# Patient Record
Sex: FEMALE | Race: OTHER | HISPANIC OR LATINO
[De-identification: names, ages, dates, MRNs, and addresses within clinical notes are randomized per-mention and may not be internally consistent; named-entity substitution may affect disease eponyms.]

---

## 2018-01-07 ENCOUNTER — TRANSCRIPTION ENCOUNTER (OUTPATIENT)
Age: 83
End: 2018-01-07

## 2018-06-13 ENCOUNTER — TRANSCRIPTION ENCOUNTER (OUTPATIENT)
Age: 83
End: 2018-06-13

## 2018-10-03 ENCOUNTER — TRANSCRIPTION ENCOUNTER (OUTPATIENT)
Age: 83
End: 2018-10-03

## 2018-10-12 PROBLEM — Z00.00 ENCOUNTER FOR PREVENTIVE HEALTH EXAMINATION: Status: ACTIVE | Noted: 2018-10-12

## 2018-10-13 ENCOUNTER — INPATIENT (INPATIENT)
Facility: HOSPITAL | Age: 83
LOS: 0 days | Discharge: HOME | End: 2018-10-13
Attending: HOSPITALIST | Admitting: HOSPITALIST

## 2018-10-13 ENCOUNTER — TRANSCRIPTION ENCOUNTER (OUTPATIENT)
Age: 83
End: 2018-10-13

## 2018-10-13 VITALS
SYSTOLIC BLOOD PRESSURE: 122 MMHG | OXYGEN SATURATION: 96 % | TEMPERATURE: 97 F | RESPIRATION RATE: 18 BRPM | HEART RATE: 68 BPM | DIASTOLIC BLOOD PRESSURE: 62 MMHG

## 2018-10-13 VITALS
OXYGEN SATURATION: 97 % | RESPIRATION RATE: 16 BRPM | HEIGHT: 61 IN | TEMPERATURE: 96 F | SYSTOLIC BLOOD PRESSURE: 174 MMHG | HEART RATE: 57 BPM | DIASTOLIC BLOOD PRESSURE: 74 MMHG | WEIGHT: 110.01 LBS

## 2018-10-13 DIAGNOSIS — Z90.710 ACQUIRED ABSENCE OF BOTH CERVIX AND UTERUS: Chronic | ICD-10-CM

## 2018-10-13 LAB
ANION GAP SERPL CALC-SCNC: 11 MMOL/L — SIGNIFICANT CHANGE UP (ref 7–14)
APTT BLD: 37.5 SEC — SIGNIFICANT CHANGE UP (ref 27–39.2)
BASOPHILS # BLD AUTO: 0.06 K/UL — SIGNIFICANT CHANGE UP (ref 0–0.2)
BASOPHILS NFR BLD AUTO: 0.6 % — SIGNIFICANT CHANGE UP (ref 0–1)
BUN SERPL-MCNC: 29 MG/DL — HIGH (ref 10–20)
CALCIUM SERPL-MCNC: 10.4 MG/DL — HIGH (ref 8.5–10.1)
CHLORIDE SERPL-SCNC: 100 MMOL/L — SIGNIFICANT CHANGE UP (ref 98–110)
CK MB CFR SERPL CALC: 1.4 NG/ML — SIGNIFICANT CHANGE UP (ref 0.6–6.3)
CK MB CFR SERPL CALC: 1.5 NG/ML — SIGNIFICANT CHANGE UP (ref 0.6–6.3)
CK SERPL-CCNC: 45 U/L — SIGNIFICANT CHANGE UP (ref 0–225)
CK SERPL-CCNC: 46 U/L — SIGNIFICANT CHANGE UP (ref 0–225)
CO2 SERPL-SCNC: 30 MMOL/L — SIGNIFICANT CHANGE UP (ref 17–32)
CREAT SERPL-MCNC: 1.3 MG/DL — SIGNIFICANT CHANGE UP (ref 0.7–1.5)
EOSINOPHIL # BLD AUTO: 0.58 K/UL — SIGNIFICANT CHANGE UP (ref 0–0.7)
EOSINOPHIL NFR BLD AUTO: 5.4 % — SIGNIFICANT CHANGE UP (ref 0–8)
GLUCOSE BLDC GLUCOMTR-MCNC: 96 MG/DL — SIGNIFICANT CHANGE UP (ref 70–99)
GLUCOSE SERPL-MCNC: 111 MG/DL — HIGH (ref 70–99)
HCT VFR BLD CALC: 40.6 % — SIGNIFICANT CHANGE UP (ref 37–47)
HGB BLD-MCNC: 12.9 G/DL — SIGNIFICANT CHANGE UP (ref 12–16)
IMM GRANULOCYTES NFR BLD AUTO: 0.3 % — SIGNIFICANT CHANGE UP (ref 0.1–0.3)
INR BLD: 1.18 RATIO — SIGNIFICANT CHANGE UP (ref 0.65–1.3)
LYMPHOCYTES # BLD AUTO: 3.49 K/UL — HIGH (ref 1.2–3.4)
LYMPHOCYTES # BLD AUTO: 32.6 % — SIGNIFICANT CHANGE UP (ref 20.5–51.1)
MCHC RBC-ENTMCNC: 27.5 PG — SIGNIFICANT CHANGE UP (ref 27–31)
MCHC RBC-ENTMCNC: 31.8 G/DL — LOW (ref 32–37)
MCV RBC AUTO: 86.6 FL — SIGNIFICANT CHANGE UP (ref 81–99)
MONOCYTES # BLD AUTO: 1.33 K/UL — HIGH (ref 0.1–0.6)
MONOCYTES NFR BLD AUTO: 12.4 % — HIGH (ref 1.7–9.3)
NEUTROPHILS # BLD AUTO: 5.2 K/UL — SIGNIFICANT CHANGE UP (ref 1.4–6.5)
NEUTROPHILS NFR BLD AUTO: 48.7 % — SIGNIFICANT CHANGE UP (ref 42.2–75.2)
NRBC # BLD: 0 /100 WBCS — SIGNIFICANT CHANGE UP (ref 0–0)
NT-PROBNP SERPL-SCNC: 508 PG/ML — HIGH (ref 0–300)
PLATELET # BLD AUTO: 200 K/UL — SIGNIFICANT CHANGE UP (ref 130–400)
POTASSIUM SERPL-MCNC: 4.1 MMOL/L — SIGNIFICANT CHANGE UP (ref 3.5–5)
POTASSIUM SERPL-SCNC: 4.1 MMOL/L — SIGNIFICANT CHANGE UP (ref 3.5–5)
PROTHROM AB SERPL-ACNC: 13.5 SEC — HIGH (ref 9.95–12.87)
RBC # BLD: 4.69 M/UL — SIGNIFICANT CHANGE UP (ref 4.2–5.4)
RBC # FLD: 14.3 % — SIGNIFICANT CHANGE UP (ref 11.5–14.5)
SODIUM SERPL-SCNC: 141 MMOL/L — SIGNIFICANT CHANGE UP (ref 135–146)
TROPONIN T SERPL-MCNC: <0.01 NG/ML — SIGNIFICANT CHANGE UP
TROPONIN T SERPL-MCNC: <0.01 NG/ML — SIGNIFICANT CHANGE UP
WBC # BLD: 10.69 K/UL — SIGNIFICANT CHANGE UP (ref 4.8–10.8)
WBC # FLD AUTO: 10.69 K/UL — SIGNIFICANT CHANGE UP (ref 4.8–10.8)

## 2018-10-13 RX ORDER — MEMANTINE HYDROCHLORIDE 10 MG/1
5 TABLET ORAL DAILY
Qty: 0 | Refills: 0 | Status: DISCONTINUED | OUTPATIENT
Start: 2018-10-13 | End: 2018-10-13

## 2018-10-13 RX ORDER — CARVEDILOL PHOSPHATE 80 MG/1
25 CAPSULE, EXTENDED RELEASE ORAL
Qty: 0 | Refills: 0 | Status: DISCONTINUED | OUTPATIENT
Start: 2018-10-13 | End: 2018-10-13

## 2018-10-13 RX ORDER — APIXABAN 2.5 MG/1
2.5 TABLET, FILM COATED ORAL EVERY 12 HOURS
Qty: 0 | Refills: 0 | Status: DISCONTINUED | OUTPATIENT
Start: 2018-10-13 | End: 2018-10-13

## 2018-10-13 RX ORDER — PANTOPRAZOLE SODIUM 20 MG/1
40 TABLET, DELAYED RELEASE ORAL
Qty: 0 | Refills: 0 | Status: DISCONTINUED | OUTPATIENT
Start: 2018-10-13 | End: 2018-10-13

## 2018-10-13 RX ORDER — LATANOPROST 0.05 MG/ML
1 SOLUTION/ DROPS OPHTHALMIC; TOPICAL AT BEDTIME
Qty: 0 | Refills: 0 | Status: DISCONTINUED | OUTPATIENT
Start: 2018-10-13 | End: 2018-10-13

## 2018-10-13 RX ORDER — FUROSEMIDE 40 MG
20 TABLET ORAL ONCE
Qty: 0 | Refills: 0 | Status: COMPLETED | OUTPATIENT
Start: 2018-10-13 | End: 2018-10-13

## 2018-10-13 RX ORDER — DONEPEZIL HYDROCHLORIDE 10 MG/1
10 TABLET, FILM COATED ORAL AT BEDTIME
Qty: 0 | Refills: 0 | Status: DISCONTINUED | OUTPATIENT
Start: 2018-10-13 | End: 2018-10-13

## 2018-10-13 RX ORDER — LOSARTAN POTASSIUM 100 MG/1
50 TABLET, FILM COATED ORAL DAILY
Qty: 0 | Refills: 0 | Status: DISCONTINUED | OUTPATIENT
Start: 2018-10-13 | End: 2018-10-13

## 2018-10-13 RX ORDER — ATORVASTATIN CALCIUM 80 MG/1
40 TABLET, FILM COATED ORAL AT BEDTIME
Qty: 0 | Refills: 0 | Status: DISCONTINUED | OUTPATIENT
Start: 2018-10-13 | End: 2018-10-13

## 2018-10-13 RX ADMIN — LOSARTAN POTASSIUM 50 MILLIGRAM(S): 100 TABLET, FILM COATED ORAL at 12:53

## 2018-10-13 RX ADMIN — Medication 20 MILLIGRAM(S): at 12:53

## 2018-10-13 NOTE — H&P ADULT - FAMILY HISTORY
Mother  Still living? No  Family history of ischemic heart disease (IHD), Age at diagnosis: Age Unknown  Family history of stroke, Age at diagnosis: Age Unknown

## 2018-10-13 NOTE — H&P ADULT - ATTENDING COMMENTS
85 years old female pt with medical hx of CAD s/p PCI x3 2003, HTN, DLD, glucoma/cataract, bladder cancer s/p laser 2016 being followed by her urologist dr vigil, Fredrickib on eliquis, h/o of LBBB, non hodgkin's lymphoma in remission last chemotherapy april 2018,   1-Atypical chest pain  EKG no acute chabges- cardiac enzymes negative  Patient and family want to go home and they want to follow up with cardiology as outpatient  -Patient received chemo in 2018 for DLBCL , currently in complete remsision  Pain completely resolved      2- GERD will start pantopraxole  3- AFIB will resume eliquis  4- HTN uncontrolled  4- Dispo Planning: Home as per family  Patient medically stable    Pager No. [phone] -Patient evaluated independently, Chart reviewed, agree with the medical resident clinical findings and plan of care.  85 years old female pt with medical hx of CAD s/p PCI x3 2003, HTN, DLD, glucoma/cataract, bladder cancer s/p laser 2016 being followed by her urologist dr vigil, Afib on eliquis, h/o of LBBB, non hodgkin's lymphoma in remission last chemotherapy april 2018,   1-Atypical chest pain  EKG no acute chabges- cardiac enzymes negative  Patient and family want to go home and they want to follow up with cardiology as outpatient  -Patient received chemo in 2018 for DLBCL , currently in complete remsision  Pain completely resolved      2- GERD will start pantopraxole  3- AFIB will resume eliquis  4- HTN uncontrolled  4- Dispo Planning: Home as per family  Patient medically stable    Pager No. [phone]

## 2018-10-13 NOTE — DISCHARGE NOTE ADULT - PROVIDER TOKENS
FREE:[LAST:[finkelstein],PHONE:[(   )    -],FAX:[(   )    -],ADDRESS:[cardiologist in [place]  follow up within a week]]

## 2018-10-13 NOTE — ED ADULT NURSE NOTE - PMH
Afib    Bladder cancer  in remission  CAD (coronary artery disease)    Cataract    DM (diabetes mellitus)    Glaucoma    HLD (hyperlipidemia)    HTN (hypertension)    NHL (non-Hodgkin's lymphoma)  in remission

## 2018-10-13 NOTE — ED PROVIDER NOTE - OBJECTIVE STATEMENT
patient is c/o chest pain, Lt sided, described as tightness, denies any associated f/c/n/v/sob/abd pain/back pain, denies any rash, no trauma, denies any URI symptoms. Patient chest pain resolved spontaneously, and while in ED had another episode of chest pain, which resolved spontaneously. Denies any lower ext pain/swelling.

## 2018-10-13 NOTE — DISCHARGE NOTE ADULT - CARE PROVIDER_API CALL
finkelstein,   cardiologist in [place]  follow up within a week  Phone: (   )    -  Fax: (   )    -

## 2018-10-13 NOTE — H&P ADULT - NSHPPHYSICALEXAM_GEN_ALL_CORE
Vital Signs Last 24 Hrs  T(C): 36.3 (13 Oct 2018 08:24), Max: 36.4 (13 Oct 2018 04:53)  T(F): 97.3 (13 Oct 2018 08:24), Max: 97.6 (13 Oct 2018 04:53)  HR: 56 (13 Oct 2018 08:24) (56 - 57)  BP: 181/81 (13 Oct 2018 08:24) (157/67 - 181/81)  RR: 18 (13 Oct 2018 08:24) (16 - 19)  SpO2: 96% (13 Oct 2018 08:24) (95% - 97%)    PHYSICAL EXAM:  GENERAL: NAD, speaks in full sentences, no signs of respiratory distress  CHEST/LUNG: Clear to auscultation bilaterally; No wheeze; No crackles; No accessory muscles used  HEART: Regular rate and rhythm; No murmurs;   ABDOMEN: Soft, Nontender, Nondistended; Bowel sounds present; No guarding  EXTREMITIES:  2+ Peripheral Pulses, No cyanosis or edema

## 2018-10-13 NOTE — H&P ADULT - ASSESSMENT
85 years old female pt with medical hx of CAD s/p PCI x3 2003, HTN, DLD, glucoma/cataract, bladder cancer s/p laser 2016 being followed by her urologist Filiberto neely on eliquis, h/o of LBBB, non hodgkin's lymphoma in remission last chemotherapy april 2018, vascular dementia, stage 3 CKD came to ER because of chest pain.    # chest pain for evaluation     ? GERD, NEED TO RULE OUT acs     first set of cardiac enzyme neg,      LBBB on ekg, no st elevation     will monitor cardiac enzymes 2 more sets     family requesting discharge         # GERD will start pantopraxole  # AFIB will resume eliquis  # HTN uncontrolled    will resume her BP medications    # DLD will resume statin  # diet DASH diet  # activity as tolerated  # full code 85 years old female pt with medical hx of CAD s/p PCI x3 2003, HTN, DLD, glucoma/cataract, bladder cancer s/p laser 2016 being followed by her urologist Filiberto neely on eliquis, h/o of LBBB, non hodgkin's lymphoma in remission last chemotherapy april 2018, vascular dementia, stage 3 CKD came to ER because of chest pain.    # chest pain for evaluation     ? GERD, NEED TO RULE OUT acs     first set of cardiac enzyme neg,      LBBB on ekg, no st elevation     will monitor cardiac enzymes 2 more sets     family requesting discharge    bnp > 500, congestion on xray will give stat 20 mg IV lasix         # GERD will start pantopraxole  # AFIB will resume eliquis  # HTN uncontrolled    will resume her BP medications    # DLD will resume statin  # diet DASH diet  # activity as tolerated  # full code

## 2018-10-13 NOTE — DISCHARGE NOTE ADULT - PLAN OF CARE
optimize pain likely non cardiac in rigin  cardiac enzymes and ekg negative  follow up with dr finkelstein as outpt, I called her cardiologist , aware of condition, agree with discharge on eliquis 2.5 mg BID controlled on statin controlled as outpt  on carvedilol and candesartan on eye drops currently in remission s/p last chemotherapy 04./2018

## 2018-10-13 NOTE — ED PROVIDER NOTE - MEDICAL DECISION MAKING DETAILS
patient remained hemodynamically stable, labs/eKG noted and discussed with patient. patient remained hemodynamically stable, labs/eKG noted and discussed with patient and her daughter, patient is admitted to Tele due to high CHELITA, as per EDOU provider, patient is not an OBS candidate.

## 2018-10-13 NOTE — DISCHARGE NOTE ADULT - HOSPITAL COURSE
85 years old female pt with medical hx of CAD s/p PCI x3 2003, HTN, DLD, glucoma/cataract, bladder cancer s/p laser 2016 being followed by her urologist Filiberto neely on eliquis, h/o of LBBB, non hodgkin's lymphoma in remission last chemotherapy april 2018, vascular dementia, stage 3 CKD came to ER because of chest pain.  as per daughter chest pain was left sided non radiating pressure like 4/10, continued for 10 minutes  she didnt have any chest pain INer, vitals were stable apart fronm BP was in high range but she didnt take any BP meds since yesterday night  cardiac enzymes neg, no acute st changes in EKG  discharge with follow up with her cardiologist in [place], i talked to cardiologist personally she agrees with discharge plan

## 2018-10-13 NOTE — ED PROVIDER NOTE - EYES NEGATIVE STATEMENT, MLM
cup/fed by clinician fed by PCA from group home at bedside/spoon no discharge, no irritation, no pain, no redness, and no visual changes.

## 2018-10-13 NOTE — DISCHARGE NOTE ADULT - CARE PLAN
Principal Discharge DX:	Chest pain  Goal:	optimize pain  Assessment and plan of treatment:	likely non cardiac in rigin  cardiac enzymes and ekg negative  follow up with dr finkelstein as outpt, I called her cardiologist , aware of condition, agree with discharge  Secondary Diagnosis:	Afib  Assessment and plan of treatment:	on eliquis 2.5 mg BID  Secondary Diagnosis:	HLD (hyperlipidemia)  Assessment and plan of treatment:	controlled on statin  Secondary Diagnosis:	HTN (hypertension)  Assessment and plan of treatment:	controlled as outpt  on carvedilol and candesartan  Secondary Diagnosis:	Cataract  Assessment and plan of treatment:	on eye drops  Secondary Diagnosis:	NHL (non-Hodgkin's lymphoma)  Assessment and plan of treatment:	currently in remission s/p last chemotherapy 04./2018

## 2018-10-13 NOTE — DISCHARGE NOTE ADULT - OTHER SIGNIFICANT FINDINGS
PHYSICAL EXAM:  GENERAL: NAD, speaks in full sentences, no signs of respiratory distress  CHEST/LUNG: Clear to auscultation bilaterally; No wheeze; No crackles; No accessory muscles used  HEART: Regular rate and rhythm; No murmurs;   ABDOMEN: Soft, Nontender, Nondistended; Bowel sounds present; No guarding  EXTREMITIES:  2+ Peripheral Pulses, No cyanosis or edema    10-13    141  |  100  |  29<H>  ----------------------------<  111<H>  4.1   |  30  |  1.3    Ca    10.4<H>      13 Oct 2018 03:30                          12.9   10.69 )-----------( 200      ( 13 Oct 2018 03:30 )             40.6   Troponin T, Serum (10.13.18 @ 03:30)    Troponin T, Serum: <0.01 ng/mL

## 2018-10-13 NOTE — ED ADULT NURSE NOTE - NSIMPLEMENTINTERV_GEN_ALL_ED
Implemented All Fall with Harm Risk Interventions:  [place] to call system. Call bell, personal items and telephone within reach. Instruct patient to call for assistance. Room bathroom lighting operational. Non-slip footwear when patient is off stretcher. Physically safe environment: no spills, clutter or unnecessary equipment. Stretcher in lowest position, wheels locked, appropriate side rails in place. Provide visual cue, wrist band, yellow gown, etc. Monitor gait and stability. Monitor for mental status changes and reorient to person, place, and time. Review medications for side effects contributing to fall risk. Reinforce activity limits and safety measures with patient and family. Provide visual clues: red socks.

## 2018-10-13 NOTE — H&P ADULT - NSHPLABSRESULTS_GEN_ALL_CORE
10-13    141  |  100  |  29<H>  ----------------------------<  111<H>  4.1   |  30  |  1.3    Ca    10.4<H>      13 Oct 2018 03:30                            12.9   10.69 )-----------( 200      ( 13 Oct 2018 03:30 )             40.6   Troponin T, Serum (10.13.18 @ 03:30)    Troponin T, Serum: <0.01 ng/mL

## 2018-10-13 NOTE — DISCHARGE NOTE ADULT - PATIENT PORTAL LINK FT
You can access the [facility] Patient Portal, offered by [facility], by registering with the following website: [email or web address]

## 2018-10-13 NOTE — DISCHARGE NOTE ADULT - MEDICATION SUMMARY - MEDICATIONS TO TAKE
I will START or STAY ON the medications listed below when I get home from the hospital:    candesartan 16 mg oral tablet  -- 1 tab(s) by mouth once a day  -- Indication: For HTN (hypertension)    Eliquis 2.5 mg oral tablet  -- 1 tab(s) by mouth 2 times a day  -- Indication: For Afib    carvedilol 25 mg oral tablet  -- 1 tab(s) by mouth 2 times a day  -- Indication: For CAD (coronary artery disease)    donepezil 10 mg oral tablet  -- 1 tab(s) by mouth once a day (at bedtime)  -- Indication: For Dementia    memantine 5 mg oral tablet  -- orally once a day  -- Indication: For Dementia    latanoprost ophthalmic  -- Indication: For Cataract    Combigan 0.2%-0.5% ophthalmic solution  -- 1 drop(s) to each affected eye every 12 hours  -- Indication: For Cataract

## 2018-10-13 NOTE — H&P ADULT - HISTORY OF PRESENT ILLNESS
85 years old female pt with medical hx of CAD s/p PCI x3 2003, HTN, DLD, glucoma/cataract, bladder cancer s/p laser 2016 being followed by her urologist Filiberto neely on eliquis, h/o of LBBB, non hodgkin's lymphoma in remission last chemotherapy april 2018, vascular dementia, stage 3 CKD came to ER because of chest pain.  the patient has dementia she doesnt recall what happened last night, she stated that she had lower abdominal pain that resolved after passing gas.  as per daughter, yesterday night her mother called her she had chest pain, which was left sided 4/10 in intensity non radiating pressure like not associated with SOB, palpitation that lasted for 10 minutes.  she denies any nausea, abdominal pain, no urinary complaints, has chronic cough non productive, which her primary thinks GERD, has outpt appointment with pulmonologist .  she is mobile, has 24 hour aid for her daily activities, denies any sick contact or travelling history.  she had her flu and pneumococcal vaccine this year.  she was treated with 7 days of oral antibiotics for UTI last week,  doesnt recall the antibiotic name.  In er, her vitals are stable, first set of cardiac enzymes negative, no chest pain .

## 2018-10-19 DIAGNOSIS — I25.10 ATHEROSCLEROTIC HEART DISEASE OF NATIVE CORONARY ARTERY WITHOUT ANGINA PECTORIS: ICD-10-CM

## 2018-10-19 DIAGNOSIS — Z95.5 PRESENCE OF CORONARY ANGIOPLASTY IMPLANT AND GRAFT: ICD-10-CM

## 2018-10-19 DIAGNOSIS — Z28.21 IMMUNIZATION NOT CARRIED OUT BECAUSE OF PATIENT REFUSAL: ICD-10-CM

## 2018-10-19 DIAGNOSIS — Z85.51 PERSONAL HISTORY OF MALIGNANT NEOPLASM OF BLADDER: ICD-10-CM

## 2018-10-19 DIAGNOSIS — Z79.01 LONG TERM (CURRENT) USE OF ANTICOAGULANTS: ICD-10-CM

## 2018-10-19 DIAGNOSIS — R07.89 OTHER CHEST PAIN: ICD-10-CM

## 2018-10-19 DIAGNOSIS — I48.91 UNSPECIFIED ATRIAL FIBRILLATION: ICD-10-CM

## 2018-10-19 DIAGNOSIS — K21.9 GASTRO-ESOPHAGEAL REFLUX DISEASE WITHOUT ESOPHAGITIS: ICD-10-CM

## 2018-10-19 DIAGNOSIS — H40.9 UNSPECIFIED GLAUCOMA: ICD-10-CM

## 2018-10-19 DIAGNOSIS — C85.90 NON-HODGKIN LYMPHOMA, UNSPECIFIED, UNSPECIFIED SITE: ICD-10-CM

## 2018-10-19 DIAGNOSIS — E78.5 HYPERLIPIDEMIA, UNSPECIFIED: ICD-10-CM

## 2018-10-19 DIAGNOSIS — I12.9 HYPERTENSIVE CHRONIC KIDNEY DISEASE WITH STAGE 1 THROUGH STAGE 4 CHRONIC KIDNEY DISEASE, OR UNSPECIFIED CHRONIC KIDNEY DISEASE: ICD-10-CM

## 2018-10-19 DIAGNOSIS — N18.3 CHRONIC KIDNEY DISEASE, STAGE 3 (MODERATE): ICD-10-CM

## 2018-10-19 DIAGNOSIS — F01.50 VASCULAR DEMENTIA WITHOUT BEHAVIORAL DISTURBANCE: ICD-10-CM

## 2018-10-19 DIAGNOSIS — R07.9 CHEST PAIN, UNSPECIFIED: ICD-10-CM

## 2018-10-19 DIAGNOSIS — H26.9 UNSPECIFIED CATARACT: ICD-10-CM

## 2018-11-05 ENCOUNTER — APPOINTMENT (OUTPATIENT)
Dept: UROLOGY | Facility: CLINIC | Age: 83
End: 2018-11-05
Payer: MEDICARE

## 2018-11-05 DIAGNOSIS — C66.9 MALIGNANT NEOPLASM OF UNSPECIFIED URETER: ICD-10-CM

## 2018-11-05 DIAGNOSIS — N13.30 UNSPECIFIED HYDRONEPHROSIS: ICD-10-CM

## 2018-11-05 DIAGNOSIS — C67.9 MALIGNANT NEOPLASM OF BLADDER, UNSPECIFIED: ICD-10-CM

## 2018-11-05 PROCEDURE — 99203 OFFICE O/P NEW LOW 30 MIN: CPT

## 2018-11-05 NOTE — REVIEW OF SYSTEMS
[see HPI] : see HPI [Negative] : Psychiatric [Fever] : no fever [Chills] : no chills [Feeling Poorly] : not feeling poorly [Feeling Tired] : not feeling tired [Eye Pain] : no eye pain [Red Eyes] : eyes not red [Earache] : no earache [Loss Of Hearing] : no hearing loss [Shortness Of Breath] : no shortness of breath [Abdominal Pain] : no abdominal pain [Vomiting] : no vomiting [Arthralgias] : no arthralgias [Joint Pain] : no joint pain [Skin Lesions] : no skin lesions

## 2018-11-05 NOTE — ASSESSMENT
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

## 2018-11-05 NOTE — PHYSICAL EXAM

## 2018-12-19 ENCOUNTER — TRANSCRIPTION ENCOUNTER (OUTPATIENT)
Age: 83
End: 2018-12-19

## 2019-01-14 PROBLEM — I25.10 ATHEROSCLEROTIC HEART DISEASE OF NATIVE CORONARY ARTERY WITHOUT ANGINA PECTORIS: Chronic | Status: ACTIVE | Noted: 2018-10-13

## 2019-01-14 PROBLEM — H40.9 UNSPECIFIED GLAUCOMA: Chronic | Status: ACTIVE | Noted: 2018-10-13

## 2019-01-14 PROBLEM — E11.9 TYPE 2 DIABETES MELLITUS WITHOUT COMPLICATIONS: Chronic | Status: ACTIVE | Noted: 2018-10-13

## 2019-01-14 PROBLEM — C85.90 NON-HODGKIN LYMPHOMA, UNSPECIFIED, UNSPECIFIED SITE: Chronic | Status: ACTIVE | Noted: 2018-10-13

## 2019-01-14 PROBLEM — C67.9 MALIGNANT NEOPLASM OF BLADDER, UNSPECIFIED: Chronic | Status: ACTIVE | Noted: 2018-10-13

## 2019-01-14 PROBLEM — I10 ESSENTIAL (PRIMARY) HYPERTENSION: Chronic | Status: ACTIVE | Noted: 2018-10-13

## 2019-01-14 PROBLEM — E78.5 HYPERLIPIDEMIA, UNSPECIFIED: Chronic | Status: ACTIVE | Noted: 2018-10-13

## 2019-01-14 PROBLEM — I48.91 UNSPECIFIED ATRIAL FIBRILLATION: Chronic | Status: ACTIVE | Noted: 2018-10-13

## 2019-01-14 PROBLEM — H26.9 UNSPECIFIED CATARACT: Chronic | Status: ACTIVE | Noted: 2018-10-13

## 2019-03-04 ENCOUNTER — APPOINTMENT (OUTPATIENT)
Dept: UROLOGY | Facility: CLINIC | Age: 84
End: 2019-03-04

## 2019-04-17 ENCOUNTER — MEDICATION RENEWAL (OUTPATIENT)
Age: 84
End: 2019-04-17

## 2019-04-17 DIAGNOSIS — E78.5 HYPERLIPIDEMIA, UNSPECIFIED: ICD-10-CM

## 2019-04-17 RX ORDER — ROSUVASTATIN CALCIUM 20 MG/1
20 TABLET, FILM COATED ORAL DAILY
Qty: 90 | Refills: 3 | Status: ACTIVE | COMMUNITY
Start: 2019-04-17 | End: 1900-01-01

## 2020-11-05 NOTE — HISTORY OF PRESENT ILLNESS
Referred by: Yancy Hill MD; Medical Diagnosis (from order):    Diagnosis Information      Diagnosis    724.2 (ICD-9-CM) - M54.5 (ICD-10-CM) - Bilateral low back pain without sciatica, unspecified chronicity                Physical Therapy -  Daily Treatment Note    Visit:  2     SUBJECTIVE                                                                                                             [place] the zoo and have a little bit of lateral ankle pain per pt       OBJECTIVE                                                                                                                        TREATMENT                                                                                                                  Therapeutic Exercise:  Pt education of stability VS flexibility and post partum laxity   Started on HEP and performed  ADD with red ball  Add with pelvic tilt   Add with bridge   ABD with red band   Only pain free ROM     Bridge varied foot placement 20  Butterfly bridge 20  Clam r/L   lapricone clam 20    Legs  up on swissball   [facility]     Skilled input: verbal instruction/cues    Writer verbally educated and received verbal consent for hand placement, positioning of patient, and techniques to be performed today from patient     Home Exercise Program: Started on HEP and performed  ADD with red ball  Add with pelvic tilt   Add with bridge   ABD with red band   Only pain free ROM     Added ABD exercise toe tap on supine knee at 90 degrees     Cryotherapy (02171): Cold Pack  Location: LB  Position: supine  Duration: 15 minutes  Results: improved range of motion and decreased pain      ASSESSMENT                                                                                                             Tolerated the treatment well and will cont to progress as tolerated with core and ABD activation exercises     Patient Education:   Results of above outlined education: Demonstrates understanding, Needs reinforcement and Verbalizes understanding      PLAN                                                                                                                           Suggestions for next session as indicated: Progress per plan of care  Cont to progress as tolerated        GOALS                                                                                                                       Long Term Goals: To be met by end of plan of care:      Home Exercise Program: Independent with progressed and modified home exercise program (HEP)      Sit: Sit for 45 minutes for a meal and feeding the children (Eating (self-care))  Lift: Lift household items and with reported manageable/tolerable difficulty (Assisting others (examples: , elder care) (domestic life))  Bend/Squat: Bend/squat for activities of daily living and instrumental activites of daily living with reported manageable/tolerable difficulty (Acquisition of necessities (examples: grocery shopping, gardening) (domestic life))    Patient Reported Outcome Measure: Improvement in function /disability/impairment as indicated by Oswestry (minimal detectable change - 12%) , or =   25       Procedures and total treatment time documented Time Entry flowsheet. [FreeTextEntry1] : 84 yo with urothelial cancer (ureter vs bladder)\par she also has a history of vascular dementia\par and non-hodgkins lymphoma\par \par she was diagnosed in 2016\par she required ureteral stent placement\par she had evidence of a right ureteral tumor\par \par the information was extrapolated from imaging studies and pathology reports that were in her binder\par the doctors notes and treatment records were not available\par \par it is not clear to me how her cancer was treated and why she required stents - lymphoma vs bladder cancer\par she also doesn’t have any recent imaging studies (last study > 6 months ago) [Urinary Incontinence] : no urinary incontinence [Urinary Retention] : no urinary retention [Nocturia] : no nocturia [Straining] : no straining [Weak Stream] : no weak stream [Hematuria - Gross] : no gross hematuria

## 2020-11-12 ENCOUNTER — APPOINTMENT (OUTPATIENT)
Dept: GERIATRICS | Facility: CLINIC | Age: 85
End: 2020-11-12

## 2022-05-14 ENCOUNTER — NON-APPOINTMENT (OUTPATIENT)
Age: 87
End: 2022-05-14

## 2022-09-02 NOTE — DISCHARGE NOTE ADULT - NS AS DC AMI YN
Accessed site: right radial artery. Radial access needle used. Number of attempts: 1. Accessed successfully. no

## 2022-12-26 ENCOUNTER — EMERGENCY (EMERGENCY)
Facility: HOSPITAL | Age: 87
LOS: 0 days | Discharge: HOME | End: 2022-12-26
Attending: EMERGENCY MEDICINE | Admitting: EMERGENCY MEDICINE
Payer: MEDICARE

## 2022-12-26 VITALS
OXYGEN SATURATION: 94 % | DIASTOLIC BLOOD PRESSURE: 70 MMHG | SYSTOLIC BLOOD PRESSURE: 156 MMHG | HEART RATE: 97 BPM | TEMPERATURE: 96 F | RESPIRATION RATE: 20 BRPM | WEIGHT: 138.01 LBS

## 2022-12-26 DIAGNOSIS — I50.9 HEART FAILURE, UNSPECIFIED: ICD-10-CM

## 2022-12-26 DIAGNOSIS — M79.89 OTHER SPECIFIED SOFT TISSUE DISORDERS: ICD-10-CM

## 2022-12-26 DIAGNOSIS — I48.91 UNSPECIFIED ATRIAL FIBRILLATION: ICD-10-CM

## 2022-12-26 DIAGNOSIS — I25.10 ATHEROSCLEROTIC HEART DISEASE OF NATIVE CORONARY ARTERY WITHOUT ANGINA PECTORIS: ICD-10-CM

## 2022-12-26 DIAGNOSIS — R22.0 LOCALIZED SWELLING, MASS AND LUMP, HEAD: ICD-10-CM

## 2022-12-26 DIAGNOSIS — Z95.5 PRESENCE OF CORONARY ANGIOPLASTY IMPLANT AND GRAFT: ICD-10-CM

## 2022-12-26 DIAGNOSIS — R60.9 EDEMA, UNSPECIFIED: ICD-10-CM

## 2022-12-26 DIAGNOSIS — N18.9 CHRONIC KIDNEY DISEASE, UNSPECIFIED: ICD-10-CM

## 2022-12-26 DIAGNOSIS — Z90.710 ACQUIRED ABSENCE OF BOTH CERVIX AND UTERUS: Chronic | ICD-10-CM

## 2022-12-26 DIAGNOSIS — Z79.01 LONG TERM (CURRENT) USE OF ANTICOAGULANTS: ICD-10-CM

## 2022-12-26 LAB
ALBUMIN SERPL ELPH-MCNC: 3.2 G/DL — LOW (ref 3.5–5.2)
ALP SERPL-CCNC: 138 U/L — HIGH (ref 30–115)
ALT FLD-CCNC: 22 U/L — SIGNIFICANT CHANGE UP (ref 0–41)
ANION GAP SERPL CALC-SCNC: 8 MMOL/L — SIGNIFICANT CHANGE UP (ref 7–14)
AST SERPL-CCNC: 30 U/L — SIGNIFICANT CHANGE UP (ref 0–41)
BASOPHILS # BLD AUTO: 0.05 K/UL — SIGNIFICANT CHANGE UP (ref 0–0.2)
BASOPHILS NFR BLD AUTO: 1.2 % — HIGH (ref 0–1)
BILIRUB SERPL-MCNC: 0.4 MG/DL — SIGNIFICANT CHANGE UP (ref 0.2–1.2)
BUN SERPL-MCNC: 32 MG/DL — HIGH (ref 10–20)
CALCIUM SERPL-MCNC: 9.2 MG/DL — SIGNIFICANT CHANGE UP (ref 8.4–10.5)
CHLORIDE SERPL-SCNC: 102 MMOL/L — SIGNIFICANT CHANGE UP (ref 98–110)
CO2 SERPL-SCNC: 21 MMOL/L — SIGNIFICANT CHANGE UP (ref 17–32)
CREAT SERPL-MCNC: 1.6 MG/DL — HIGH (ref 0.7–1.5)
EGFR: 30 ML/MIN/1.73M2 — LOW
EOSINOPHIL # BLD AUTO: 0.2 K/UL — SIGNIFICANT CHANGE UP (ref 0–0.7)
EOSINOPHIL NFR BLD AUTO: 4.8 % — SIGNIFICANT CHANGE UP (ref 0–8)
GLUCOSE SERPL-MCNC: 140 MG/DL — HIGH (ref 70–99)
HCT VFR BLD CALC: 36.9 % — LOW (ref 37–47)
HGB BLD-MCNC: 11.7 G/DL — LOW (ref 12–16)
IMM GRANULOCYTES NFR BLD AUTO: 0.2 % — SIGNIFICANT CHANGE UP (ref 0.1–0.3)
LYMPHOCYTES # BLD AUTO: 1.31 K/UL — SIGNIFICANT CHANGE UP (ref 1.2–3.4)
LYMPHOCYTES # BLD AUTO: 31.3 % — SIGNIFICANT CHANGE UP (ref 20.5–51.1)
MAGNESIUM SERPL-MCNC: 2.1 MG/DL — SIGNIFICANT CHANGE UP (ref 1.8–2.4)
MCHC RBC-ENTMCNC: 25.7 PG — LOW (ref 27–31)
MCHC RBC-ENTMCNC: 31.7 G/DL — LOW (ref 32–37)
MCV RBC AUTO: 81.1 FL — SIGNIFICANT CHANGE UP (ref 81–99)
MONOCYTES # BLD AUTO: 0.68 K/UL — HIGH (ref 0.1–0.6)
MONOCYTES NFR BLD AUTO: 16.3 % — HIGH (ref 1.7–9.3)
NEUTROPHILS # BLD AUTO: 1.93 K/UL — SIGNIFICANT CHANGE UP (ref 1.4–6.5)
NEUTROPHILS NFR BLD AUTO: 46.2 % — SIGNIFICANT CHANGE UP (ref 42.2–75.2)
NRBC # BLD: 0 /100 WBCS — SIGNIFICANT CHANGE UP (ref 0–0)
NT-PROBNP SERPL-SCNC: 6715 PG/ML — HIGH (ref 0–300)
PLATELET # BLD AUTO: 217 K/UL — SIGNIFICANT CHANGE UP (ref 130–400)
POTASSIUM SERPL-MCNC: 4.8 MMOL/L — SIGNIFICANT CHANGE UP (ref 3.5–5)
POTASSIUM SERPL-SCNC: 4.8 MMOL/L — SIGNIFICANT CHANGE UP (ref 3.5–5)
PROT SERPL-MCNC: 6 G/DL — SIGNIFICANT CHANGE UP (ref 6–8)
RBC # BLD: 4.55 M/UL — SIGNIFICANT CHANGE UP (ref 4.2–5.4)
RBC # FLD: 15.5 % — HIGH (ref 11.5–14.5)
SODIUM SERPL-SCNC: 131 MMOL/L — LOW (ref 135–146)
TROPONIN T SERPL-MCNC: <0.01 NG/ML — SIGNIFICANT CHANGE UP
WBC # BLD: 4.18 K/UL — LOW (ref 4.8–10.8)
WBC # FLD AUTO: 4.18 K/UL — LOW (ref 4.8–10.8)

## 2022-12-26 PROCEDURE — 71045 X-RAY EXAM CHEST 1 VIEW: CPT | Mod: 26

## 2022-12-26 PROCEDURE — 99285 EMERGENCY DEPT VISIT HI MDM: CPT

## 2022-12-26 PROCEDURE — 93010 ELECTROCARDIOGRAM REPORT: CPT

## 2022-12-26 RX ORDER — CANDESARTAN CILEXETIL 8 MG/1
1 TABLET ORAL
Qty: 0 | Refills: 0 | DISCHARGE

## 2022-12-26 RX ORDER — CARVEDILOL PHOSPHATE 80 MG/1
1 CAPSULE, EXTENDED RELEASE ORAL
Qty: 0 | Refills: 0 | DISCHARGE

## 2022-12-26 NOTE — ED PROVIDER NOTE - PROGRESS NOTE DETAILS
EK - spoke to cardiology on call covering for pt's cardiologist Dr. Yang - pt w/ prior hx sensitivity to volume changes (taken off HCTZ in the past for orthostatic hypotension/syncope, taken off lasix) and recent EF only mildly dec.  If leg swelling is not severe, ok to defer question of diuretic rx to o/p cardiology - will call for appointment.

## 2022-12-26 NOTE — ED ADULT NURSE NOTE - SUICIDE SCREENING DEPRESSION
Negative
EXAM:  CT CERVICAL SPINE                          EXAM:  CT BRAIN                            PROCEDURE DATE:  08/27/2017            INTERPRETATION:  CLINICAL INFORMATION: Status post fall. Trauma. Altered   mental status. Neck pain.    TECHNIQUE:  1.  Noncontrast axial CT images were acquired the head.  Two-dimensional   reformats were generated.  2.  Noncontrast axial CT images were acquired to the cervical spine.    Two-dimensional reformats were generated.    COMPARISON: CT head and cervicalspine 2/21/2017.    FINDINGS:    HEAD:    There is no CT evidence of acute intracranial hemorrhage, extra-axial   collection, vasogenic edema, mass effect, midline shift, central   herniation, hydrocephalus or acute territorial infarct.     Moderate generalized cerebral volume loss and mild periventricular white   matter hypoattenuation compatible chronic microvascular ischemic disease   are stable since 02/21/2017.    The visualized paranasal sinuses are clear.    The mastoid air cells and middle ear cavities are grossly clear.    The visualized bones and extra cranial soft tissues are grossly   unremarkable.

## 2022-12-26 NOTE — ED PROVIDER NOTE - NSFOLLOWUPINSTRUCTIONS_ED_ALL_ED_FT
[address]853 Comprehensive WHAT YOU NEED TO KNOW:    Leg edema is swelling caused by fluid buildup. Your legs may swell if you sit or stand for long periods of time, are pregnant, or are injured. Swelling may also occur if you have heart failure or circulation problems. This means that your heart does not pump blood through your body as it should.    DISCHARGE INSTRUCTIONS:    Self-care:     Elevate your legs: Raise your legs above the level of your heart as often as you can. This will help decrease swelling and pain. Prop your legs on pillows or blankets to keep them elevated comfortably.      Wear pressure stockings: These tight stockings put pressure on your legs to promote blood flow and prevent blood clots. Wear the stockings during the day. Do not wear them while you sleep.      Apply heat: Heat helps decrease pain and swelling. Apply heat on the area for 20 to 30 minutes every 2 hours for as many days as directed.       Stay active: Do not stand or sit for long periods of time. Ask your healthcare provider about the best exercise plan for you.      Eat healthy foods: Healthy foods include fruits, vegetables, whole-grain breads, low-fat dairy products, beans, lean meats, and fish. Ask if you need to be on a special diet. Limit salt. Salt will make your body hold even more fluid.    Follow up with your healthcare provider as directed: Write down your questions so you remember to ask them during your visits.     Contact your healthcare provider if:     You have a fever or feel more tired than usual.      The veins in your legs look larger than usual. They may look full or bulging.      Your legs itch or feel heavy.      You have red or white areas or sores on your legs. The skin may also appear dimpled or have indentations.      You are gaining weight.      You have trouble moving your ankles.      The swelling does not go away, or other parts of your body swell.      You have questions or concerns about your condition or care.    Return to the emergency department if:     You cannot walk.      You feel faint or confused.       Your skin turns blue or gray.      Your leg feels warm, tender, and painful. It may be swollen and red.      You have chest pain or trouble breathing that is worse when you lie down.      You suddenly feel lightheaded and have trouble breathing.      You have new and sudden chest pain. You may have more pain when you take deep breaths or cough. You may also cough up blood.

## 2022-12-26 NOTE — ED PROVIDER NOTE - OBJECTIVE STATEMENT
89yF CAD CKD p/w b/l LE x 3wk, apparently worsened today.  Hx provided by pt but mostly by daughter at bedside - b/l LE edema was first noted on visit with Dr. Salazar 12/5.  Today, HHA contacted daughter to say leg swelling looked worse and she also had facial swelling.  Daughter assessed her this afternoon - facial swelling has completely resolved but leg swelling still present.  Daughter called pt's cardiologist Dr. Worley of [facility] and was advised to bring pt to the ED for evaluation.  Daughter says cardiologist is awaiting ED's call.  Daughter says pt was previously on lasix but it was stopped ~2yr ago bc pt's swelling had resolved.  No new CP or SOB.

## 2022-12-26 NOTE — ED PROVIDER NOTE - NS ED ROS FT
Constitutional: no fevers/chills, no sick contacts  Eyes: No visual changes, eye pain or discharge. No photophobia  ENMT: No hearing changes, pain, discharge or infections. No sore throat or drooling.  Neck:  No neck pain or stiffness. No limited ROM  Cardiac: No SOB, +edema. No chest pain with exertion.  Respiratory: No cough or respiratory distress. No hemoptysis. No history of asthma or RAD  GI: No nausea, vomiting, diarrhea or abdominal pain  : No dysuria, frequency or burning. No discharge  MS: No myalgia, muscle weakness, joint pain or back pain  Neuro: No headache or weakness. No LOC  Skin: No skin rash  Endo: no diabetes or thyroid dysfunction  Heme: no abnormal bleeding or clotting  Except as documented in the HPI, all other systems are negative

## 2022-12-26 NOTE — ED PROVIDER NOTE - PHYSICAL EXAMINATION
CONSTITUTIONAL: well developed; well nourished; well appearing in no acute distress  HEAD: normocephalic; atraumatic  EYES: no conjunctival injection, no scleral icterus  ENT: no nasal discharge; airway clear.  NECK: supple; non tender. + full passive ROM in all directions  CARD: S1, S2 normal; no murmurs, gallops, or rubs. Regular rate and rhythm  RESP: b/l breath sounds w/o tachypnea or inc WOB, +scattered rhonchi  ABD: soft; non-distended; non-tender. No rebound, no guarding, no pulsatile abdominal mass  EXT: moving all extremities spontaneously, normal ROM. No clubbing, cyanosis, 1+ b/l LE edema  SKIN: warm and dry, no lesions noted  NEURO: alert, oriented, CN II-XII grossly intact, motor and sensory grossly intact, speech nonslurred, gait deferred, no focal deficits. GCS 15  PSYCH: calm, cooperative, appropriate, good eye contact, logical thought process, no apparent danger to self or others

## 2022-12-26 NOTE — ED PROVIDER NOTE - CARE PLAN
1 Principal Discharge DX:	Leg swelling  Secondary Diagnosis:	CKD (chronic kidney disease)  Secondary Diagnosis:	Atrial fibrillation  Secondary Diagnosis:	Chronic CHF

## 2022-12-26 NOTE — ED PROVIDER NOTE - PATIENT PORTAL LINK FT
You can access the FollowMyHealth Patient Portal offered by [facility] by registering at the following website: [email or web address]. By joining [employer]’s FollowMyHealth portal, you will also be able to view your health information using other applications (apps) compatible with our system.

## 2022-12-26 NOTE — ED ADULT NURSE NOTE - NSFALLRSKASSESASSIST_ED_ALL_ED
Pt placed on high flow, sat's 97% but feels she can not take deep breath and that she is not getting any o2, and she is on 9lnc and does not want the mask, had trouble adjusting to the high flow initially but then became accustomed to it   no

## 2022-12-26 NOTE — ED PROVIDER NOTE - CLINICAL SUMMARY MEDICAL DECISION MAKING FREE TEXT BOX
89yF CAD s/p PCI CHF not on diuretics afib on eliquis CKD p/w b/l LE edema x weeks and ?facial edema noticed today.  Pt well appearing w/o resp distress, hypoperfusion, RVR/hypotension or airway compromise.  Exam w/o any facial swelling and w/ trace to 1+ b/l LE edema.  No CP or SOB.  EKG w/ afib w/o RVR or acute ischemia.  CXR w/o sig volume overload.  Labs reassuring, with stable electrolytes and renal function (CKD at baseline w/ Cr 1.6), neg trop x1 and elevated BNP 6100 c/w known CHF.  No concern for acute CHF exacerbation or cardiorenal syndrome.  D/w pt's cardiologist - will hold off on prescribing lasix given mild edema and hx of sensitivity to volume changes.  Instead, pt/family instructed to call cardiologist in the morning for an appointment, where she can be reassessed in person and prescribed meds as indicated.  Ok to dc with supportive care, including low salt diet and leg elevation as possible, o/p f/u, return precautions. 89yF CAD s/p PCI CHF not on diuretics afib on eliquis CKD p/w b/l LE edema x weeks and ?facial edema noticed today.  Pt well appearing w/o resp distress, hypoperfusion, RVR/hypotension or airway compromise.  Exam w/o any facial swelling and w/ trace to 1+ b/l LE edema.  No CP or SOB.  EKG w/ afib w/o RVR or acute ischemia.  CXR w/o sig volume overload.  Labs reassuring, with stable electrolytes and renal function (CKD at baseline w/ Cr 1.6), neg trop x1 and elevated BNP 6100 c/w known CHF.  No concern for acute CHF exacerbation or cardiorenal syndrome.  No concern for anaphylaxis or angioedema, though pt counseled to dc her abx (finished day 7/10) just in case this was a mild/self-limited allergic response.  No concern for facial infection.  D/w pt's cardiologist - will hold off on prescribing lasix given mild edema and hx of sensitivity to volume changes.  Instead, pt/family instructed to call cardiologist in the morning for an appointment, where she can be reassessed in person and prescribed meds as indicated.  Ok to dc with supportive care, including low salt diet and leg elevation as possible, o/p f/u, return precautions.

## 2023-02-05 ENCOUNTER — INPATIENT (INPATIENT)
Facility: HOSPITAL | Age: 88
LOS: 8 days | Discharge: HOME HEALTH | DRG: 963 | End: 2023-02-14
Attending: SURGERY | Admitting: SURGERY
Payer: MEDICARE

## 2023-02-05 VITALS
OXYGEN SATURATION: 94 % | HEART RATE: 81 BPM | SYSTOLIC BLOOD PRESSURE: 98 MMHG | DIASTOLIC BLOOD PRESSURE: 61 MMHG | TEMPERATURE: 98 F | RESPIRATION RATE: 16 BRPM

## 2023-02-05 DIAGNOSIS — Z90.710 ACQUIRED ABSENCE OF BOTH CERVIX AND UTERUS: Chronic | ICD-10-CM

## 2023-02-05 LAB
ALBUMIN SERPL ELPH-MCNC: 3.4 G/DL — LOW (ref 3.5–5.2)
ALP SERPL-CCNC: 216 U/L — HIGH (ref 30–115)
ALT FLD-CCNC: 72 U/L — HIGH (ref 0–41)
ANION GAP SERPL CALC-SCNC: 11 MMOL/L — SIGNIFICANT CHANGE UP (ref 7–14)
APTT BLD: 29.8 SEC — SIGNIFICANT CHANGE UP (ref 27–39.2)
AST SERPL-CCNC: 142 U/L — HIGH (ref 0–41)
BASOPHILS # BLD AUTO: 0.08 K/UL — SIGNIFICANT CHANGE UP (ref 0–0.2)
BASOPHILS NFR BLD AUTO: 0.5 % — SIGNIFICANT CHANGE UP (ref 0–1)
BILIRUB SERPL-MCNC: 0.6 MG/DL — SIGNIFICANT CHANGE UP (ref 0.2–1.2)
BUN SERPL-MCNC: 40 MG/DL — HIGH (ref 10–20)
CALCIUM SERPL-MCNC: 10 MG/DL — SIGNIFICANT CHANGE UP (ref 8.4–10.4)
CHLORIDE SERPL-SCNC: 96 MMOL/L — LOW (ref 98–110)
CO2 SERPL-SCNC: 29 MMOL/L — SIGNIFICANT CHANGE UP (ref 17–32)
CREAT SERPL-MCNC: 1.5 MG/DL — SIGNIFICANT CHANGE UP (ref 0.7–1.5)
EGFR: 33 ML/MIN/1.73M2 — LOW
EOSINOPHIL # BLD AUTO: 0.2 K/UL — SIGNIFICANT CHANGE UP (ref 0–0.7)
EOSINOPHIL NFR BLD AUTO: 1.2 % — SIGNIFICANT CHANGE UP (ref 0–8)
ETHANOL SERPL-MCNC: <10 MG/DL — SIGNIFICANT CHANGE UP
GLUCOSE SERPL-MCNC: 175 MG/DL — HIGH (ref 70–99)
HCT VFR BLD CALC: 38.8 % — SIGNIFICANT CHANGE UP (ref 37–47)
HGB BLD-MCNC: 12.2 G/DL — SIGNIFICANT CHANGE UP (ref 12–16)
IMM GRANULOCYTES NFR BLD AUTO: 1.4 % — HIGH (ref 0.1–0.3)
INR BLD: 1.35 RATIO — HIGH (ref 0.65–1.3)
LACTATE SERPL-SCNC: 2.9 MMOL/L — HIGH (ref 0.7–2)
LIDOCAIN IGE QN: 70 U/L — HIGH (ref 7–60)
LYMPHOCYTES # BLD AUTO: 20.8 % — SIGNIFICANT CHANGE UP (ref 20.5–51.1)
LYMPHOCYTES # BLD AUTO: 3.48 K/UL — HIGH (ref 1.2–3.4)
MCHC RBC-ENTMCNC: 25.9 PG — LOW (ref 27–31)
MCHC RBC-ENTMCNC: 31.4 G/DL — LOW (ref 32–37)
MCV RBC AUTO: 82.4 FL — SIGNIFICANT CHANGE UP (ref 81–99)
MONOCYTES # BLD AUTO: 1.37 K/UL — HIGH (ref 0.1–0.6)
MONOCYTES NFR BLD AUTO: 8.2 % — SIGNIFICANT CHANGE UP (ref 1.7–9.3)
NEUTROPHILS # BLD AUTO: 11.37 K/UL — HIGH (ref 1.4–6.5)
NEUTROPHILS NFR BLD AUTO: 67.9 % — SIGNIFICANT CHANGE UP (ref 42.2–75.2)
NRBC # BLD: 0 /100 WBCS — SIGNIFICANT CHANGE UP (ref 0–0)
PLATELET # BLD AUTO: 265 K/UL — SIGNIFICANT CHANGE UP (ref 130–400)
POTASSIUM SERPL-MCNC: 4.5 MMOL/L — SIGNIFICANT CHANGE UP (ref 3.5–5)
POTASSIUM SERPL-SCNC: 4.5 MMOL/L — SIGNIFICANT CHANGE UP (ref 3.5–5)
PROT SERPL-MCNC: 7 G/DL — SIGNIFICANT CHANGE UP (ref 6–8)
PROTHROM AB SERPL-ACNC: 15.5 SEC — HIGH (ref 9.95–12.87)
RBC # BLD: 4.71 M/UL — SIGNIFICANT CHANGE UP (ref 4.2–5.4)
RBC # FLD: 19.8 % — HIGH (ref 11.5–14.5)
SARS-COV-2 RNA SPEC QL NAA+PROBE: SIGNIFICANT CHANGE UP
SODIUM SERPL-SCNC: 136 MMOL/L — SIGNIFICANT CHANGE UP (ref 135–146)
WBC # BLD: 16.73 K/UL — HIGH (ref 4.8–10.8)
WBC # FLD AUTO: 16.73 K/UL — HIGH (ref 4.8–10.8)

## 2023-02-05 PROCEDURE — 71250 CT THORAX DX C-: CPT | Mod: 26,MA

## 2023-02-05 PROCEDURE — 93010 ELECTROCARDIOGRAM REPORT: CPT

## 2023-02-05 PROCEDURE — 74176 CT ABD & PELVIS W/O CONTRAST: CPT | Mod: 26,MA

## 2023-02-05 PROCEDURE — 72170 X-RAY EXAM OF PELVIS: CPT | Mod: 26

## 2023-02-05 PROCEDURE — 73552 X-RAY EXAM OF FEMUR 2/>: CPT | Mod: 26,LT

## 2023-02-05 PROCEDURE — 72125 CT NECK SPINE W/O DYE: CPT | Mod: 26,MA

## 2023-02-05 PROCEDURE — 70450 CT HEAD/BRAIN W/O DYE: CPT | Mod: 26,MA

## 2023-02-05 PROCEDURE — 71045 X-RAY EXAM CHEST 1 VIEW: CPT | Mod: 26

## 2023-02-05 RX ORDER — MORPHINE SULFATE 50 MG/1
2 CAPSULE, EXTENDED RELEASE ORAL ONCE
Refills: 0 | Status: DISCONTINUED | OUTPATIENT
Start: 2023-02-05 | End: 2023-02-05

## 2023-02-05 RX ORDER — SODIUM CHLORIDE 9 MG/ML
500 INJECTION, SOLUTION INTRAVENOUS ONCE
Refills: 0 | Status: COMPLETED | OUTPATIENT
Start: 2023-02-05 | End: 2023-02-05

## 2023-02-05 RX ADMIN — SODIUM CHLORIDE 500 MILLILITER(S): 9 INJECTION, SOLUTION INTRAVENOUS at 22:07

## 2023-02-05 RX ADMIN — MORPHINE SULFATE 2 MILLIGRAM(S): 50 CAPSULE, EXTENDED RELEASE ORAL at 21:59

## 2023-02-05 NOTE — ED ADULT NURSE NOTE - DOES PATIENT HAVE ADVANCE DIRECTIVE
Rhofade Pregnancy And Lactation Text: This medication has not been assigned a Pregnancy Risk Category. It is unknown if the medication is excreted in breast milk. No

## 2023-02-05 NOTE — CONSULT NOTE ADULT - ATTENDING COMMENTS
Trauma Attending H&P Attestation    Patient seen and evaluated with the trauma team in the trauma bay upon arrival. All pertinent labs and radiographic imaging reviewed, pending final reports. Outpatient medications reviewed, including the presence of anticoagulants, if applicable. I agree with the resident's note above, including the physical exam findings, assessment and plan as documented with the following adjustments.     Trauma Level: [ ] Code  [x ] Alert  [ ] Consult [ ] Transfer in  Activation by:  [x ] ED physician [x ] EMS  Intubated in Field? [ ] Yes [x ] No  Intubated in ED? [ ] Yes [x ] No  Intubated in Trauma [place]? [ ] Yes [ x] No    BRIANNE DELACRUZ Patient is a 89y old  Female who presents with a chief complaint of  fall from siting position on AC, denied head injury  Patient presented with GCS [15 ]  upon arrival to the trauma bay.  Allergies  No Known Allergies  Intolerances    PAST MEDICAL & SURGICAL HISTORY:  HTN (hypertension)  DM (diabetes mellitus)  CAD (coronary artery disease)  Afib  Bladder cancer in remission  NHL (non-Hodgkin&#x27;s lymphoma) in remission  HLD (hyperlipidemia)  Glaucoma  Cataract  Hypothyroidism  History of total hysterectomy    On AC/Antiplatelets [x ] Yes [ ] No              [x ] NOVACs, [ ] Coumadin, [ ] ASA, [ ] Antiplatelets     Vital Signs Last 24 Hrs  T(C): 36.7 (05 Feb 2023 19:40), Max: 36.7 (05 Feb 2023 19:40)  T(F): 98 (05 Feb 2023 19:40), Max: 98 (05 Feb 2023 19:40)  HR: 81 (05 Feb 2023 19:40) (81 - 81)  BP: 98/61 (05 Feb 2023 19:40) (98/61 - 98/61)  BP(mean): --  RR: 16 (05 Feb 2023 19:40) (16 - 16)  SpO2: 94% (05 Feb 2023 19:40) (94% - 94%)    Parameters below as of 05 Feb 2023 19:40  Patient On (Oxygen Delivery Method): nasal cannula  O2 Flow (L/min): 2    Assessment: fall on AC    PLAN  - supportive care  - GI/DVT prophylaxis  - pain management  - repeat studies as needed  - complete and follow up on trauma work up included but not limites to                          [ x] CXR [ x] PXR [ x] Extremities X-RAYs                          [x ] NCHCT [x ] C-Spine CT [x ] CT Chest [x ] CT Abdomen/Pelvis                          [x ] FAST [ ] Other                          [x ] Trauma Labs   [ ] Toxicology   - Follow up Consults  [ ] Neurosurgery [ ] Orthopaedics [ ] Plastics [ ] Fascial/OMFS [ ] Opthalmology [x ] Medicine [ ] Geriatrics [ ] Cardiology/EP                                        [ ] Pediatric ICU   [ ] Urology   [ ] ENT   [ ] SICU/SDU [ ] Hospice/Palliative Care  - IV ABx give as indicated [ ] Yes [x ] No  - Tetanus given as indicated [ ] Yes [x ] No  - Seizures prophylaxis  [ ] Yes,  [x ] No    Maritza Bruce MD, FACS  Trauma/ACS/Surgical Critical Care Attending

## 2023-02-05 NOTE — ED ADULT NURSE NOTE - OBJECTIVE STATEMENT
BIBA for trauma alert s/p falling from rocking chair at home   fall was unwitnessed. as per daughter, they heard a loud thump and then heard pt say "ouch" and found her on her left side on the floor  unknown head trauma, pt c/o left hip pain

## 2023-02-05 NOTE — ED PROVIDER NOTE - OBJECTIVE STATEMENT
Pt is a 89 y/o female with PMH of a fib on eliquis, CAD, CKD (baseline Cr 1.6), hypothyroidism and dementia (AAOx2 at baseline) BIBEMS from home s/p fall. Fall was unwitnessed. Pt has home attendant who saw pt sitting in rocking chair, left and heard a thump. Home attendant then found pt on the floor. Unknown head trauma, unknown LOC, + blood thinners. Pt poor historian Pt is a 89 y/o female with PMH of a fib on eliquis, CAD, COPD on 2L NC, CKD (baseline Cr 1.6), hypothyroidism and dementia (AAOx2 at baseline) BIBEMS from home s/p fall. Fall was unwitnessed. Pt has home attendant who saw pt sitting in rocking chair, left and heard a thump. Home attendant then found pt on the floor. Unknown head trauma, unknown LOC, + blood thinners. Pt poor historian

## 2023-02-05 NOTE — ED ADULT NURSE NOTE - NSIMPLEMENTINTERV_GEN_ALL_ED
Implemented All Fall with Harm Risk Interventions:  [place] to call system. Call bell, personal items and telephone within reach. Instruct patient to call for assistance. Room bathroom lighting operational. Non-slip footwear when patient is off stretcher. Physically safe environment: no spills, clutter or unnecessary equipment. Stretcher in lowest position, wheels locked, appropriate side rails in place. Provide visual cue, wrist band, yellow gown, etc. Monitor gait and stability. Monitor for mental status changes and reorient to person, place, and time. Review medications for side effects contributing to fall risk. Reinforce activity limits and safety measures with patient and family. Provide visual clues: red socks.

## 2023-02-05 NOTE — ED PROVIDER NOTE - PROGRESS NOTE DETAILS
PS: Pt's daughter refusing IV contrast for CT. Understands will not be complete exam without IV contrast. Pelvic xray showing pubic rami fracture. Pending CT reads PS: UA requested by trauma given pubic rami fracture. Pt's daughter refusing urine sample. Offered black, straight cath or bedpan. Pt's daughter refusing. Says this is going to be torture for her mother. Pt's daughter aware that urinalysis is important for pelvic fracture evaluation but does not want to give urine sample. Trauma made aware PS: CT head and C spine negative. Ortho consulted for pubic ramus fracture MARGA: Case endorsed to Dr. Arevalo pending remainder of pan scan, final trauma/ortho recs, re-eval, dispo PS: CT showing pubic rami fracture with pelvic hemorrhage as well as L3 fracture (no point tenderness on exam) and radiology unable to rule out vascular injury given lack of contrast. Explained findings to daughter, daughter consents to repeat CT with contrast now PS: Pt's daughter agreeable to obtaining urine sample now. Says she only wants to put pt on bedpan, no straight cath or black PS: Pt accepted under surgical service

## 2023-02-05 NOTE — CONSULT NOTE ADULT - SUBJECTIVE AND OBJECTIVE BOX
TRAUMA ACTIVATION LEVEL: ALERT    ACTIVATED BY:   ED**  INTUBATED: NO**      MECHANISM OF INJURY:     [x] Fall	      GCS: 15 	E: 4	V: 5	M: 6    HPI:    89yF w/ PMHx of HTN, DM, Dementia seen as  Trauma Alert s/p unwitnessed fall, ?ht, -loc, +eliquis. The patient was in her rocking chair when a family member heard a thud-the patient is unable to describe the events but a family member was at bedside stating she did not have positive head strike. The patient has no external signs of trauma and denies any pain.       Trauma assessment in ED: ABCs intact , GCS 15 , AAOx3.    PAST MEDICAL & SURGICAL HISTORY:  HTN (hypertension)  DM (diabetes mellitus)  CAD (coronary artery disease)  Afib  Bladder cancer  in remission  NHL (non-Hodgkin&#x27;s lymphoma)  in remission  HLD (hyperlipidemia)  Glaucoma  Hypothyroidism      History of total hysterectomy          Allergies    No Known Allergies    Intolerances        Home Medications:  candesartan 4 mg oral tablet: 1 tab(s) orally once a day (26 Dec 2022 16:03)  carvedilol 6.25 mg oral tablet: 1 tab(s) orally 2 times a day (26 Dec 2022 16:03)  Combigan 0.2%-0.5% ophthalmic solution: 1 drop(s) to each affected eye every 12 hours (26 Dec 2022 16:03)  donepezil 10 mg oral tablet: 1 tab(s) orally once a day (at bedtime) (26 Dec 2022 16:03)  Eliquis 2.5 mg oral tablet: 1 tab(s) orally 2 times a day (26 Dec 2022 16:03)  isosorbide mononitrate 30 mg oral tablet, extended release: 1 tab(s) orally once a day (in the morning) (26 Dec 2022 16:03)  latanoprost ophthalmic:  (26 Dec 2022 16:03)  levothyroxine 25 mcg (0.025 mg) oral tablet: 1 tab(s) orally once a day (26 Dec 2022 16:03)  memantine 5 mg oral tablet: orally once a day (26 Dec 2022 16:03)      ROS: 10-system review is otherwise negative except HPI above.      Primary Survey:    A - airway intact  B - bilateral breath sounds and good chest rise  C - palpable pulses in all extremities  D - GCS 15 on arrival, ELIZONDO  Exposure obtained    Vital Signs Last 24 Hrs  T(C): 36.7 (05 Feb 2023 19:40), Max: 36.7 (05 Feb 2023 19:40)  T(F): 98 (05 Feb 2023 19:40), Max: 98 (05 Feb 2023 19:40)  HR: 81 (05 Feb 2023 19:40) (81 - 81)  BP: 98/61 (05 Feb 2023 19:40) (98/61 - 98/61)  BP(mean): --  RR: 16 (05 Feb 2023 19:40) (16 - 16)  SpO2: 94% (05 Feb 2023 19:40) (94% - 94%)    Parameters below as of 05 Feb 2023 19:40  Patient On (Oxygen Delivery Method): nasal cannula  O2 Flow (L/min): 2      Secondary Survey:   General: NAD  HEENT: Normocephalic, atraumatic, EOMI, PEERLA. no scalp lacerations, phil[place] collar in place  Neck: Soft, midline trachea. no c-spine tenderness  Chest: No chest wall tenderness, no subcutaneous emphysema   Cardiac: S1, S2, RRR  Respiratory: Bilateral breath sounds, clear and equal bilaterally  Abdomen: Soft, non-distended, non-tender, no rebound, no guarding.  Groin: Normal appearing, pelvis stable   Ext:  Moving b/l upper and lower extremities. Palpable Radial b/l UE, b/l DP palpable in LE.       FAST: pending    ACCESS / DEVICES:  [x ] Peripheral IV    Labs:  pending      RADIOLOGY & ADDITIONAL STUDIES: pending  ---------------------------------------------------------------------------------------

## 2023-02-05 NOTE — ED ADULT NURSE NOTE - NSICDXPASTMEDICALHX_GEN_ALL_CORE_FT
PAST MEDICAL HISTORY:  Afib     Bladder cancer in remission    CAD (coronary artery disease)     Cataract     DM (diabetes mellitus)     Glaucoma     HLD (hyperlipidemia)     HTN (hypertension)     Hypothyroidism     NHL (non-Hodgkin's lymphoma) in remission

## 2023-02-05 NOTE — ED ADULT NURSE NOTE - NSICDXFAMILYHX_GEN_ALL_CORE_FT
FAMILY HISTORY:  Mother  Still living? No  Family history of ischemic heart disease (IHD), Age at diagnosis: Age Unknown  Family history of stroke, Age at diagnosis: Age Unknown

## 2023-02-05 NOTE — ED PROVIDER NOTE - ATTENDING CONTRIBUTION TO CARE
89-year-old female with past medical history as documented presenting status post unwitnessed fall.  Per report, patient has home health aide and aide heard her fall, ultimately finding patient on the floor conscious but unable to stand up.  Unknown if head trauma or LOC and patient is very poor historian.  Patient at this time not providing significant history and is unsure how she fell.    Vital Signs: I have reviewed the initial vital signs.  Constitutional: NAD, well-nourished, appears stated age, no acute distress.  HEENT: Airway patent, moist MM, no erythema/swelling/deformity of oral structures. EOMI, PERRLA.  CV: regular rate, regular rhythm, well-perfused extremities, 2+ b/l DP and radial pulses equal.  Lungs: BCTA, no increased WOB.  ABD: NTND, no guarding or rebound, no pulsatile mass, no hernias.   MSK: Neck supple, nontender, nl ROM, no stepoff. Chest nontender. Back nontender in TLS spine or to b/l bony structures or flanks. Ext nontender, nl rom, no deformity.   INTEG: Skin warm, dry, no rash.  NEURO: A&Ox3, normal strength, nl sensation throughout, normal speech.   PSYCH: Calm, cooperative, normal affect and interaction.    Trauma alert was called as patient is on Eliquis.  Will obtain pan scan per trauma recommendations, labs, reeval.

## 2023-02-05 NOTE — ED PROVIDER NOTE - CLINICAL SUMMARY MEDICAL DECISION MAKING FREE TEXT BOX
89 yo woman with mechnical fall on apixaban.  Received sign out.  Patient with pubic ramus fracture and lumbar spine fracture.  Some evidence of hemorrhage.  DNR/DNI as per daughter at bedside and MOLST completed.  Admitted to trauma.

## 2023-02-05 NOTE — ED PROCEDURE NOTE - US CPT CODES
16730 Abdominal Ultrasound (GB/FAST) [SOB] : shortness of breath [Dyspnea on exertion] : dyspnea during exertion [Lower Ext Edema] : lower extremity edema [Negative] : Cardiovascular

## 2023-02-05 NOTE — ED PROVIDER NOTE - PHYSICAL EXAMINATION
CONST: elderly female, NAD  HEAD:  normocephalic, atraumatic, no guzman sign  EYES:  conjunctivae without injection, drainage or discharge  ENMT:  nasal mucosa moist; mouth moist without ulcerations or lesions; throat moist without erythema, exudate, ulcerations or lesions  NECK:  supple  CARDIAC:  regular rate and rhythm, normal S1 and S2, no murmurs, rubs or gallops  RESP:  respiratory rate and effort appear normal for age; lungs are clear to auscultation bilaterally; no rales or wheezes  ABDOMEN:  soft, nontender, nondistended  MUSCULOSKELETAL/NEURO: AAOx2, ELIZONDO   SKIN:  normal skin color for age and race, well-perfused; warm and dry

## 2023-02-05 NOTE — ED ADULT NURSE NOTE - SUICIDE SCREENING QUESTION 3
No
What Is The Reason For Today's Visit?: Full Body Skin Examination
What Is The Reason For Today's Visit? (Being Monitored For X): concerning skin lesions on an annual basis

## 2023-02-05 NOTE — CONSULT NOTE ADULT - ASSESSMENT
Orthopaedic Surgery Consult Note    Time consult called: 10:30 PM  Time patient seen: 10:45 PM  Time abx given:  Phone number:    89yo Female PMH dementia, HTN, DM, CAD s/p multiple stents (last MI in Dec 2022), Afib on eliquis, Bladder cancer, Non-Hodgkin's lymphona, COPD, HLD, hypothyrodism presents with left pubic ramus fracture s/p mechanical fall. Per daughter, patient had an unwitnessed fall earlier this evening. Family heard a thud and saw patient on floor in pain. Patient unable to ambulate following fall.     PMH/PSH  No pertinent family history in first degree relatives    Family history of ischemic heart disease (IHD) (Mother)    Family history of stroke (Mother)    MEWS Score    HTN (hypertension)    DM (diabetes mellitus)    CAD (coronary artery disease)    Afib    Bladder cancer    NHL (non-Hodgkin&#x27;s lymphoma)    HLD (hyperlipidemia)    Glaucoma    Cataract    Hypothyroidism    Pubic ramus fracture    History of total hysterectomy    SLIP AND FALL    41    [email or web address]        Medications      Home Medications:  candesartan 4 mg oral tablet: 1 tab(s) orally once a day (26 Dec 2022 16:03)  carvedilol 6.25 mg oral tablet: 1 tab(s) orally 2 times a day (26 Dec 2022 16:03)  Combigan 0.2%-0.5% ophthalmic solution: 1 drop(s) to each affected eye every 12 hours (26 Dec 2022 16:03)  donepezil 10 mg oral tablet: 1 tab(s) orally once a day (at bedtime) (26 Dec 2022 16:03)  Eliquis 2.5 mg oral tablet: 1 tab(s) orally 2 times a day (26 Dec 2022 16:03)  isosorbide mononitrate 30 mg oral tablet, extended release: 1 tab(s) orally once a day (in the morning) (26 Dec 2022 16:03)  latanoprost ophthalmic:  (26 Dec 2022 16:03)  levothyroxine 25 mcg (0.025 mg) oral tablet: 1 tab(s) orally once a day (26 Dec 2022 16:03)  memantine 5 mg oral tablet: orally once a day (26 Dec 2022 16:03)        Allergies  No Known Allergies        T(C): 36.7 (02-05-23 @ 19:40), Max: 36.7 (02-05-23 @ 19:40)  HR: 81 (02-05-23 @ 19:40) (81 - 81)  BP: 98/61 (02-05-23 @ 19:40) (98/61 - 98/61)  RR: 16 (02-05-23 @ 19:40) (16 - 16)  SpO2: 94% (02-05-23 @ 19:40) (94% - 94%)    P/E:  AOx3, NAD  Non-labored breathing    LLE  Skin intact  No swelling/erythema/ecchymosis noted  No deformity/laceration/abrasion noted  Negative log roll/axial load  TTP over superior pubic ramus  Compartments soft and compressible, no pain w/ passive stretch of digits  SILT SP/DP/T/Sural/Saph  Firing TA/EHL/FHL/GS  DP pulse 2+, cap refill <2        Labs                        12.2   16.73 )-----------( 265      ( 05 Feb 2023 21:02 )             38.8     02-05    136  |  96<L>  |  40<H>  ----------------------------<  175<H>  4.5   |  29  |  1.5    Ca    10.0      05 Feb 2023 21:02    TPro  7.0  /  Alb  3.4<L>  /  TBili  0.6  /  DBili  x   /  AST  142<H>  /  ALT  72<H>  /  AlkPhos  216<H>  02-05    LIVER FUNCTIONS - ( 05 Feb 2023 21:02 )  Alb: 3.4 g/dL / Pro: 7.0 g/dL / ALK PHOS: 216 U/L / ALT: 72 U/L / AST: 142 U/L / GGT: x           PT/INR - ( 05 Feb 2023 21:02 )   PT: 15.50 sec;   INR: 1.35 ratio         PTT - ( 05 Feb 2023 21:02 )  PTT:29.8 sec    Imaging:    XR pelvis  displaced left superior pubic ramus fracture    A/P:  89yo Female w/ displaced left superior pubic ramus fracture. This fracture is mechanically stable and patient can weight bear as tolerated    No acute orthopaedic surgical intervention  Trauma consult  Weight bearing: WBAT LLE  Pain control  Ice/elevation  Follow-up w/ Dr. Yoder, please call [phone] to schedule an appointment  Return to ED with uncontrolled pain/bleeding/fever/chills/numbness/tingling/cool extremity/inability to move extremity

## 2023-02-05 NOTE — ED PROVIDER NOTE - CARE PLAN
Principal Discharge DX:	Pubic ramus fracture   1 Principal Discharge DX:	Pubic ramus fracture  Secondary Diagnosis:	Fracture of lumbar spine

## 2023-02-06 DIAGNOSIS — S32.599A OTHER SPECIFIED FRACTURE OF UNSPECIFIED PUBIS, INITIAL ENCOUNTER FOR CLOSED FRACTURE: ICD-10-CM

## 2023-02-06 PROBLEM — E03.9 HYPOTHYROIDISM, UNSPECIFIED: Chronic | Status: ACTIVE | Noted: 2022-12-26

## 2023-02-06 LAB
ANION GAP SERPL CALC-SCNC: 12 MMOL/L — SIGNIFICANT CHANGE UP (ref 7–14)
ANION GAP SERPL CALC-SCNC: 13 MMOL/L — SIGNIFICANT CHANGE UP (ref 7–14)
APPEARANCE UR: ABNORMAL
BACTERIA # UR AUTO: ABNORMAL
BASOPHILS # BLD AUTO: 0.07 K/UL — SIGNIFICANT CHANGE UP (ref 0–0.2)
BASOPHILS NFR BLD AUTO: 0.8 % — SIGNIFICANT CHANGE UP (ref 0–1)
BILIRUB UR-MCNC: NEGATIVE — SIGNIFICANT CHANGE UP
BUN SERPL-MCNC: 40 MG/DL — HIGH (ref 10–20)
BUN SERPL-MCNC: 40 MG/DL — HIGH (ref 10–20)
CALCIUM SERPL-MCNC: 9.6 MG/DL — SIGNIFICANT CHANGE UP (ref 8.4–10.5)
CALCIUM SERPL-MCNC: 9.8 MG/DL — SIGNIFICANT CHANGE UP (ref 8.4–10.5)
CHLORIDE SERPL-SCNC: 100 MMOL/L — SIGNIFICANT CHANGE UP (ref 98–110)
CHLORIDE SERPL-SCNC: 97 MMOL/L — LOW (ref 98–110)
CO2 SERPL-SCNC: 23 MMOL/L — SIGNIFICANT CHANGE UP (ref 17–32)
CO2 SERPL-SCNC: 24 MMOL/L — SIGNIFICANT CHANGE UP (ref 17–32)
COLOR SPEC: YELLOW — SIGNIFICANT CHANGE UP
CREAT SERPL-MCNC: 1.5 MG/DL — SIGNIFICANT CHANGE UP (ref 0.7–1.5)
CREAT SERPL-MCNC: 1.6 MG/DL — HIGH (ref 0.7–1.5)
DIFF PNL FLD: ABNORMAL
EGFR: 30 ML/MIN/1.73M2 — LOW
EGFR: 33 ML/MIN/1.73M2 — LOW
EOSINOPHIL # BLD AUTO: 0.23 K/UL — SIGNIFICANT CHANGE UP (ref 0–0.7)
EOSINOPHIL NFR BLD AUTO: 2.8 % — SIGNIFICANT CHANGE UP (ref 0–8)
EPI CELLS # UR: 1 /HPF — SIGNIFICANT CHANGE UP (ref 0–5)
GLUCOSE BLDC GLUCOMTR-MCNC: 105 MG/DL — HIGH (ref 70–99)
GLUCOSE BLDC GLUCOMTR-MCNC: 89 MG/DL — SIGNIFICANT CHANGE UP (ref 70–99)
GLUCOSE SERPL-MCNC: 125 MG/DL — HIGH (ref 70–99)
GLUCOSE SERPL-MCNC: 128 MG/DL — HIGH (ref 70–99)
GLUCOSE UR QL: NEGATIVE — SIGNIFICANT CHANGE UP
HCT VFR BLD CALC: 32.6 % — LOW (ref 37–47)
HCT VFR BLD CALC: 33.8 % — LOW (ref 37–47)
HCT VFR BLD CALC: 34.9 % — LOW (ref 37–47)
HGB BLD-MCNC: 10.4 G/DL — LOW (ref 12–16)
HGB BLD-MCNC: 10.9 G/DL — LOW (ref 12–16)
HGB BLD-MCNC: 11.1 G/DL — LOW (ref 12–16)
HYALINE CASTS # UR AUTO: 2 /LPF — SIGNIFICANT CHANGE UP (ref 0–7)
IMM GRANULOCYTES NFR BLD AUTO: 0.5 % — HIGH (ref 0.1–0.3)
KETONES UR-MCNC: NEGATIVE — SIGNIFICANT CHANGE UP
LACTATE SERPL-SCNC: 1.9 MMOL/L — SIGNIFICANT CHANGE UP (ref 0.7–2)
LACTATE SERPL-SCNC: 2.8 MMOL/L — HIGH (ref 0.7–2)
LEUKOCYTE ESTERASE UR-ACNC: ABNORMAL
LYMPHOCYTES # BLD AUTO: 2.45 K/UL — SIGNIFICANT CHANGE UP (ref 1.2–3.4)
LYMPHOCYTES # BLD AUTO: 29.4 % — SIGNIFICANT CHANGE UP (ref 20.5–51.1)
MAGNESIUM SERPL-MCNC: 2 MG/DL — SIGNIFICANT CHANGE UP (ref 1.8–2.4)
MAGNESIUM SERPL-MCNC: 2 MG/DL — SIGNIFICANT CHANGE UP (ref 1.8–2.4)
MCHC RBC-ENTMCNC: 25.9 PG — LOW (ref 27–31)
MCHC RBC-ENTMCNC: 25.9 PG — LOW (ref 27–31)
MCHC RBC-ENTMCNC: 26 PG — LOW (ref 27–31)
MCHC RBC-ENTMCNC: 31.8 G/DL — LOW (ref 32–37)
MCHC RBC-ENTMCNC: 31.9 G/DL — LOW (ref 32–37)
MCHC RBC-ENTMCNC: 32.2 G/DL — SIGNIFICANT CHANGE UP (ref 32–37)
MCV RBC AUTO: 80.5 FL — LOW (ref 81–99)
MCV RBC AUTO: 81.3 FL — SIGNIFICANT CHANGE UP (ref 81–99)
MCV RBC AUTO: 81.5 FL — SIGNIFICANT CHANGE UP (ref 81–99)
MONOCYTES # BLD AUTO: 1.22 K/UL — HIGH (ref 0.1–0.6)
MONOCYTES NFR BLD AUTO: 14.6 % — HIGH (ref 1.7–9.3)
NEUTROPHILS # BLD AUTO: 4.32 K/UL — SIGNIFICANT CHANGE UP (ref 1.4–6.5)
NEUTROPHILS NFR BLD AUTO: 51.9 % — SIGNIFICANT CHANGE UP (ref 42.2–75.2)
NITRITE UR-MCNC: NEGATIVE — SIGNIFICANT CHANGE UP
NRBC # BLD: 0 /100 WBCS — SIGNIFICANT CHANGE UP (ref 0–0)
PH UR: 6 — SIGNIFICANT CHANGE UP (ref 5–8)
PHOSPHATE SERPL-MCNC: 4.4 MG/DL — SIGNIFICANT CHANGE UP (ref 2.1–4.9)
PHOSPHATE SERPL-MCNC: 4.8 MG/DL — SIGNIFICANT CHANGE UP (ref 2.1–4.9)
PLATELET # BLD AUTO: 162 K/UL — SIGNIFICANT CHANGE UP (ref 130–400)
PLATELET # BLD AUTO: 180 K/UL — SIGNIFICANT CHANGE UP (ref 130–400)
PLATELET # BLD AUTO: 190 K/UL — SIGNIFICANT CHANGE UP (ref 130–400)
POTASSIUM SERPL-MCNC: 4.3 MMOL/L — SIGNIFICANT CHANGE UP (ref 3.5–5)
POTASSIUM SERPL-MCNC: 4.5 MMOL/L — SIGNIFICANT CHANGE UP (ref 3.5–5)
POTASSIUM SERPL-SCNC: 4.3 MMOL/L — SIGNIFICANT CHANGE UP (ref 3.5–5)
POTASSIUM SERPL-SCNC: 4.5 MMOL/L — SIGNIFICANT CHANGE UP (ref 3.5–5)
PROT UR-MCNC: ABNORMAL
RBC # BLD: 4.01 M/UL — LOW (ref 4.2–5.4)
RBC # BLD: 4.2 M/UL — SIGNIFICANT CHANGE UP (ref 4.2–5.4)
RBC # BLD: 4.28 M/UL — SIGNIFICANT CHANGE UP (ref 4.2–5.4)
RBC # FLD: 19.4 % — HIGH (ref 11.5–14.5)
RBC # FLD: 19.6 % — HIGH (ref 11.5–14.5)
RBC # FLD: 19.8 % — HIGH (ref 11.5–14.5)
RBC CASTS # UR COMP ASSIST: 24 /HPF — HIGH (ref 0–4)
SODIUM SERPL-SCNC: 134 MMOL/L — LOW (ref 135–146)
SODIUM SERPL-SCNC: 135 MMOL/L — SIGNIFICANT CHANGE UP (ref 135–146)
SP GR SPEC: 1.03 — SIGNIFICANT CHANGE UP (ref 1.01–1.03)
UROBILINOGEN FLD QL: SIGNIFICANT CHANGE UP
WBC # BLD: 8.33 K/UL — SIGNIFICANT CHANGE UP (ref 4.8–10.8)
WBC # BLD: 8.71 K/UL — SIGNIFICANT CHANGE UP (ref 4.8–10.8)
WBC # BLD: 9.35 K/UL — SIGNIFICANT CHANGE UP (ref 4.8–10.8)
WBC # FLD AUTO: 8.33 K/UL — SIGNIFICANT CHANGE UP (ref 4.8–10.8)
WBC # FLD AUTO: 8.71 K/UL — SIGNIFICANT CHANGE UP (ref 4.8–10.8)
WBC # FLD AUTO: 9.35 K/UL — SIGNIFICANT CHANGE UP (ref 4.8–10.8)
WBC UR QL: 644 /HPF — HIGH (ref 0–5)

## 2023-02-06 PROCEDURE — 99233 SBSQ HOSP IP/OBS HIGH 50: CPT

## 2023-02-06 PROCEDURE — 36415 COLL VENOUS BLD VENIPUNCTURE: CPT

## 2023-02-06 PROCEDURE — 83735 ASSAY OF MAGNESIUM: CPT

## 2023-02-06 PROCEDURE — 97162 PT EVAL MOD COMPLEX 30 MIN: CPT | Mod: GP

## 2023-02-06 PROCEDURE — 84484 ASSAY OF TROPONIN QUANT: CPT

## 2023-02-06 PROCEDURE — 93306 TTE W/DOPPLER COMPLETE: CPT

## 2023-02-06 PROCEDURE — 80048 BASIC METABOLIC PNL TOTAL CA: CPT

## 2023-02-06 PROCEDURE — 86901 BLOOD TYPING SEROLOGIC RH(D): CPT

## 2023-02-06 PROCEDURE — 97116 GAIT TRAINING THERAPY: CPT | Mod: GP

## 2023-02-06 PROCEDURE — 86900 BLOOD TYPING SEROLOGIC ABO: CPT

## 2023-02-06 PROCEDURE — 93010 ELECTROCARDIOGRAM REPORT: CPT

## 2023-02-06 PROCEDURE — 82962 GLUCOSE BLOOD TEST: CPT

## 2023-02-06 PROCEDURE — 87186 SC STD MICRODIL/AGAR DIL: CPT

## 2023-02-06 PROCEDURE — 83036 HEMOGLOBIN GLYCOSYLATED A1C: CPT

## 2023-02-06 PROCEDURE — 84100 ASSAY OF PHOSPHORUS: CPT

## 2023-02-06 PROCEDURE — 94010 BREATHING CAPACITY TEST: CPT

## 2023-02-06 PROCEDURE — 71045 X-RAY EXAM CHEST 1 VIEW: CPT

## 2023-02-06 PROCEDURE — 82553 CREATINE MB FRACTION: CPT

## 2023-02-06 PROCEDURE — 86850 RBC ANTIBODY SCREEN: CPT

## 2023-02-06 PROCEDURE — 93005 ELECTROCARDIOGRAM TRACING: CPT

## 2023-02-06 PROCEDURE — 97110 THERAPEUTIC EXERCISES: CPT | Mod: GP

## 2023-02-06 PROCEDURE — 36620 INSERTION CATHETER ARTERY: CPT

## 2023-02-06 PROCEDURE — 94760 N-INVAS EAR/PLS OXIMETRY 1: CPT

## 2023-02-06 PROCEDURE — 81001 URINALYSIS AUTO W/SCOPE: CPT

## 2023-02-06 PROCEDURE — 82550 ASSAY OF CK (CPK): CPT

## 2023-02-06 PROCEDURE — 74176 CT ABD & PELVIS W/O CONTRAST: CPT

## 2023-02-06 PROCEDURE — 85025 COMPLETE CBC W/AUTO DIFF WBC: CPT

## 2023-02-06 PROCEDURE — 99223 1ST HOSP IP/OBS HIGH 75: CPT | Mod: 24,25

## 2023-02-06 PROCEDURE — 74174 CTA ABD&PLVS W/CONTRAST: CPT | Mod: 26,MA

## 2023-02-06 PROCEDURE — 85027 COMPLETE CBC AUTOMATED: CPT

## 2023-02-06 PROCEDURE — 83605 ASSAY OF LACTIC ACID: CPT

## 2023-02-06 PROCEDURE — 87086 URINE CULTURE/COLONY COUNT: CPT

## 2023-02-06 RX ORDER — DONEPEZIL HYDROCHLORIDE 10 MG/1
10 TABLET, FILM COATED ORAL AT BEDTIME
Refills: 0 | Status: DISCONTINUED | OUTPATIENT
Start: 2023-02-06 | End: 2023-02-14

## 2023-02-06 RX ORDER — DEXTROSE 50 % IN WATER 50 %
25 SYRINGE (ML) INTRAVENOUS ONCE
Refills: 0 | Status: DISCONTINUED | OUTPATIENT
Start: 2023-02-06 | End: 2023-02-06

## 2023-02-06 RX ORDER — DEXTROSE 50 % IN WATER 50 %
12.5 SYRINGE (ML) INTRAVENOUS ONCE
Refills: 0 | Status: DISCONTINUED | OUTPATIENT
Start: 2023-02-06 | End: 2023-02-06

## 2023-02-06 RX ORDER — NOREPINEPHRINE BITARTRATE/D5W 8 MG/250ML
0.05 PLASTIC BAG, INJECTION (ML) INTRAVENOUS
Qty: 8 | Refills: 0 | Status: DISCONTINUED | OUTPATIENT
Start: 2023-02-06 | End: 2023-02-09

## 2023-02-06 RX ORDER — METHENAMINE MANDELATE 1 G
1 TABLET ORAL
Qty: 0 | Refills: 0 | DISCHARGE

## 2023-02-06 RX ORDER — ISOSORBIDE MONONITRATE 60 MG/1
30 TABLET, EXTENDED RELEASE ORAL DAILY
Refills: 0 | Status: DISCONTINUED | OUTPATIENT
Start: 2023-02-06 | End: 2023-02-06

## 2023-02-06 RX ORDER — MORPHINE SULFATE 50 MG/1
2 CAPSULE, EXTENDED RELEASE ORAL ONCE
Refills: 0 | Status: DISCONTINUED | OUTPATIENT
Start: 2023-02-06 | End: 2023-02-06

## 2023-02-06 RX ORDER — APIXABAN 2.5 MG/1
2.5 TABLET, FILM COATED ORAL
Refills: 0 | Status: DISCONTINUED | OUTPATIENT
Start: 2023-02-06 | End: 2023-02-06

## 2023-02-06 RX ORDER — LEVOTHYROXINE SODIUM 125 MCG
25 TABLET ORAL DAILY
Refills: 0 | Status: DISCONTINUED | OUTPATIENT
Start: 2023-02-06 | End: 2023-02-14

## 2023-02-06 RX ORDER — BRIMONIDINE TARTRATE, TIMOLOL MALEATE 2; 5 MG/ML; MG/ML
1 SOLUTION/ DROPS OPHTHALMIC
Qty: 0 | Refills: 0 | DISCHARGE

## 2023-02-06 RX ORDER — ACETAMINOPHEN 500 MG
650 TABLET ORAL EVERY 6 HOURS
Refills: 0 | Status: DISCONTINUED | OUTPATIENT
Start: 2023-02-06 | End: 2023-02-14

## 2023-02-06 RX ORDER — SODIUM CHLORIDE 9 MG/ML
1000 INJECTION, SOLUTION INTRAVENOUS
Refills: 0 | Status: DISCONTINUED | OUTPATIENT
Start: 2023-02-06 | End: 2023-02-07

## 2023-02-06 RX ORDER — SODIUM CHLORIDE 9 MG/ML
250 INJECTION, SOLUTION INTRAVENOUS ONCE
Refills: 0 | Status: COMPLETED | OUTPATIENT
Start: 2023-02-06 | End: 2023-02-06

## 2023-02-06 RX ORDER — CARVEDILOL PHOSPHATE 80 MG/1
1 CAPSULE, EXTENDED RELEASE ORAL
Qty: 0 | Refills: 0 | DISCHARGE

## 2023-02-06 RX ORDER — CARVEDILOL PHOSPHATE 80 MG/1
6.25 CAPSULE, EXTENDED RELEASE ORAL EVERY 12 HOURS
Refills: 0 | Status: DISCONTINUED | OUTPATIENT
Start: 2023-02-06 | End: 2023-02-06

## 2023-02-06 RX ORDER — LOSARTAN POTASSIUM 100 MG/1
50 TABLET, FILM COATED ORAL DAILY
Refills: 0 | Status: DISCONTINUED | OUTPATIENT
Start: 2023-02-06 | End: 2023-02-06

## 2023-02-06 RX ORDER — CANDESARTAN CILEXETIL 8 MG/1
4 TABLET ORAL
Qty: 0 | Refills: 0 | DISCHARGE

## 2023-02-06 RX ORDER — METOPROLOL TARTRATE 50 MG
1 TABLET ORAL
Qty: 0 | Refills: 0 | DISCHARGE

## 2023-02-06 RX ORDER — GABAPENTIN 400 MG/1
100 CAPSULE ORAL THREE TIMES A DAY
Refills: 0 | Status: DISCONTINUED | OUTPATIENT
Start: 2023-02-06 | End: 2023-02-06

## 2023-02-06 RX ORDER — LATANOPROST 0.05 MG/ML
0 SOLUTION/ DROPS OPHTHALMIC; TOPICAL
Qty: 0 | Refills: 0 | DISCHARGE

## 2023-02-06 RX ORDER — APIXABAN 2.5 MG/1
1 TABLET, FILM COATED ORAL
Qty: 0 | Refills: 0 | DISCHARGE

## 2023-02-06 RX ORDER — ISOSORBIDE MONONITRATE 60 MG/1
1 TABLET, EXTENDED RELEASE ORAL
Qty: 0 | Refills: 0 | DISCHARGE

## 2023-02-06 RX ORDER — MEMANTINE HYDROCHLORIDE 10 MG/1
5 TABLET ORAL DAILY
Refills: 0 | Status: DISCONTINUED | OUTPATIENT
Start: 2023-02-06 | End: 2023-02-14

## 2023-02-06 RX ORDER — SODIUM CHLORIDE 9 MG/ML
500 INJECTION, SOLUTION INTRAVENOUS ONCE
Refills: 0 | Status: COMPLETED | OUTPATIENT
Start: 2023-02-06 | End: 2023-02-06

## 2023-02-06 RX ORDER — CANDESARTAN CILEXETIL 8 MG/1
1 TABLET ORAL
Qty: 0 | Refills: 0 | DISCHARGE

## 2023-02-06 RX ORDER — MEMANTINE HYDROCHLORIDE 10 MG/1
0 TABLET ORAL
Qty: 0 | Refills: 0 | DISCHARGE

## 2023-02-06 RX ORDER — DONEPEZIL HYDROCHLORIDE 10 MG/1
1 TABLET, FILM COATED ORAL
Qty: 0 | Refills: 0 | DISCHARGE

## 2023-02-06 RX ORDER — INSULIN LISPRO 100/ML
VIAL (ML) SUBCUTANEOUS
Refills: 0 | Status: DISCONTINUED | OUTPATIENT
Start: 2023-02-06 | End: 2023-02-14

## 2023-02-06 RX ORDER — CEFEPIME 1 G/1
1000 INJECTION, POWDER, FOR SOLUTION INTRAMUSCULAR; INTRAVENOUS ONCE
Refills: 0 | Status: COMPLETED | OUTPATIENT
Start: 2023-02-06 | End: 2023-02-06

## 2023-02-06 RX ORDER — LEVOTHYROXINE SODIUM 125 MCG
1 TABLET ORAL
Qty: 0 | Refills: 0 | DISCHARGE

## 2023-02-06 RX ORDER — CEFEPIME 1 G/1
1000 INJECTION, POWDER, FOR SOLUTION INTRAMUSCULAR; INTRAVENOUS EVERY 8 HOURS
Refills: 0 | Status: DISCONTINUED | OUTPATIENT
Start: 2023-02-07 | End: 2023-02-10

## 2023-02-06 RX ORDER — CEFEPIME 1 G/1
INJECTION, POWDER, FOR SOLUTION INTRAMUSCULAR; INTRAVENOUS
Refills: 0 | Status: DISCONTINUED | OUTPATIENT
Start: 2023-02-06 | End: 2023-02-10

## 2023-02-06 RX ADMIN — SODIUM CHLORIDE 500 MILLILITER(S): 9 INJECTION, SOLUTION INTRAVENOUS at 13:20

## 2023-02-06 RX ADMIN — ISOSORBIDE MONONITRATE 30 MILLIGRAM(S): 60 TABLET, EXTENDED RELEASE ORAL at 06:33

## 2023-02-06 RX ADMIN — Medication 6.09 MICROGRAM(S)/KG/MIN: at 16:35

## 2023-02-06 RX ADMIN — DONEPEZIL HYDROCHLORIDE 10 MILLIGRAM(S): 10 TABLET, FILM COATED ORAL at 21:10

## 2023-02-06 RX ADMIN — SODIUM CHLORIDE 1000 MILLILITER(S): 9 INJECTION, SOLUTION INTRAVENOUS at 15:40

## 2023-02-06 RX ADMIN — CARVEDILOL PHOSPHATE 6.25 MILLIGRAM(S): 80 CAPSULE, EXTENDED RELEASE ORAL at 06:33

## 2023-02-06 RX ADMIN — Medication 25 MICROGRAM(S): at 06:33

## 2023-02-06 RX ADMIN — MORPHINE SULFATE 2 MILLIGRAM(S): 50 CAPSULE, EXTENDED RELEASE ORAL at 01:42

## 2023-02-06 RX ADMIN — Medication 650 MILLIGRAM(S): at 05:35

## 2023-02-06 RX ADMIN — SODIUM CHLORIDE 50 MILLILITER(S): 9 INJECTION, SOLUTION INTRAVENOUS at 06:33

## 2023-02-06 RX ADMIN — SODIUM CHLORIDE 50 MILLILITER(S): 9 INJECTION, SOLUTION INTRAVENOUS at 20:41

## 2023-02-06 RX ADMIN — GABAPENTIN 100 MILLIGRAM(S): 400 CAPSULE ORAL at 13:26

## 2023-02-06 RX ADMIN — Medication 6.09 MICROGRAM(S)/KG/MIN: at 20:42

## 2023-02-06 RX ADMIN — Medication 650 MILLIGRAM(S): at 18:02

## 2023-02-06 RX ADMIN — MEMANTINE HYDROCHLORIDE 5 MILLIGRAM(S): 10 TABLET ORAL at 14:29

## 2023-02-06 RX ADMIN — CEFEPIME 100 MILLIGRAM(S): 1 INJECTION, POWDER, FOR SOLUTION INTRAMUSCULAR; INTRAVENOUS at 20:41

## 2023-02-06 RX ADMIN — GABAPENTIN 100 MILLIGRAM(S): 400 CAPSULE ORAL at 06:33

## 2023-02-06 RX ADMIN — SODIUM CHLORIDE 50 MILLILITER(S): 9 INJECTION, SOLUTION INTRAVENOUS at 13:20

## 2023-02-06 RX ADMIN — Medication 650 MILLIGRAM(S): at 13:26

## 2023-02-06 NOTE — CONSULT NOTE ADULT - ATTENDING COMMENTS
89 y/o female, S/P Fall.  Closed Left Superior and Inferior Pubic Rami Fractures.  Left Pelvic Hematoma.  L3 compression fracture.  Acute pain due to trauma.  Hypotension. Hypovolemia.  At risk for hemodynamic instability.  CHF. A.fib.  Coagulopathy due to Eliquis  RUBY.    PLAN:  - pain control prn  - continuous O2Sat and hemodynamic monitoring  - keep MAP>65; use Nicom  - hold BP meds  - Cardiology eval  - follow serum electrolytes and UOP  - serial h&h  - DVT prophylaxis with SCDs only for now  Admit to SICU 89 y/o female, S/P Fall.  Closed Left Superior and Inferior Pubic Rami Fractures.  Left Pelvic Hematoma.  L3 compression fracture.  Acute pain due to trauma.  Hypotension. Hypovolemia.  At risk for hemodynamic instability.  CHF. A.fib.  Coagulopathy due to Eliquis  RUBY (Creat. 1.6)    PLAN:  - pain control prn  - continuous O2Sat and hemodynamic monitoring  - keep MAP>65; use Nicom  - hold BP meds  - Cardiology eval  - follow serum electrolytes and UOP  - serial h&h  - DVT prophylaxis with SCDs only for now  Admit to SICU

## 2023-02-06 NOTE — H&P ADULT - NSHPPHYSICALEXAM_GEN_ALL_CORE
Secondary Survey:   General: NAD  HEENT: Normocephalic, atraumatic, EOMI, PEERLA. no scalp lacerations, [place] collar in place  Neck: Soft, midline trachea. no c-spine tenderness  Chest: No chest wall tenderness, no subcutaneous emphysema   Cardiac: S1, S2, RRR  Respiratory: Bilateral breath sounds, clear and equal bilaterally  Abdomen: Soft, non-distended, non-tender, no rebound, no guarding.  Groin: Normal appearing, pelvis stable   Ext:  Moving b/l upper and lower extremities. Palpable Radial b/l UE, b/l DP palpable in LE.

## 2023-02-06 NOTE — ED ADULT NURSE REASSESSMENT NOTE - NS ED NURSE REASSESS COMMENT FT1
pt complaining of chest pain, EKG performed noted to be in rapid a-fib. [phone] notified. ordered 5mg lopressor & pt upgraded to tele.

## 2023-02-06 NOTE — CONSULT NOTE ADULT - ASSESSMENT
IMPRESSION: Rehab of left pelvic fx, L3 comp fx, s/p fall / dementia, HTN, CAD, DM hypothyroidism, a fib, HLD    PRECAUTIONS: [   ] Cardiac  [   ] Respiratory  [   ] Seizures [   ] Contact Isolation  [   ] Droplet Isolation  [   ] Other    Weight Bearing Status: WBAT LLE    RECOMMENDATION:    Out of Bed to Chair     DVT/Decubiti Prophylaxis    REHAB PLAN:     [  x  ] Bedside P/T 3-5 times a week   [    ]   Bedside O/T  2-3 times a week             [    ] Speech Therapy               [    ]  No Rehab Therapy Indicated   Conditioning/ROM                                    ADL  Bed Mobility                                               Conditioning/ROM  Transfers                                                     Bed Mobility  Sitting /Standing Balance                         Transfers                                        Gait Training                                               Sitting/Standing Balance  Stair Training [   ]Applicable                    Home equipment Eval                                                                        Splinting  [   ] Only      GOALS:   ADL   [    ]   Independent                    Transfers  [ x   ] Independent                          Ambulation  [x    ] Independent     [  x   ] With device                            [    ]  CG                                                         [    ]  CG                                                                  [    ] CG                            [    ] Min A                                                   [    ] Min A                                                              [    ] Min  A          DISCHARGE PLAN:   [    ]  Good candidate for Intensive Rehabilitation/Hospital based                                             Will tolerate 3hrs Intensive Rehab Daily                                       [  x   ]  Short Term Rehab in Skilled Nursing Facility                           vs            [  x   ]  Home with Outpatient or  services                                         [     ]  Possible Candidate for Intensive Hospital based Rehab

## 2023-02-06 NOTE — H&P ADULT - ATTENDING COMMENTS
Trauma Attending H&P Attestation    Patient seen and evaluated with the trauma team in the trauma bay upon arrival. All pertinent labs and radiographic imaging reviewed, pending final reports. Outpatient medications reviewed, including the presence of anticoagulants, if applicable. I agree with the resident's note above, including the physical exam findings, assessment and plan as documented with the following adjustments.     Trauma Level: [ ] Code  [x ] Alert  [ ] Consult [ ] Transfer in  Activation by:  [x ] ED physician [x ] EMS  Intubated in Field? [ ] Yes [x ] No  Intubated in ED? [ ] Yes [x ] No  Intubated in Trauma [place]? [ ] Yes [x ] No    BRIANNE DELACRUZ Patient is a 89y old  Female who presents with a chief complaint of Fall at home and sustained pelvic fracture      Patient presented with GCS [14 ]  upon arrival to the trauma bay.  Allergies  iodine (Rash)  lisinopril (Rash)  Intolerances    On AC/Antiplatelets [x ] Yes [ ] No              [x ] NOVACs, [ ] Coumadin, [ ] ASA, [ ] Antiplatelets     PE: lethargic    Assessment: Fall with pelvic fracture                              Coagulopathy     PLAN  - Admit to Trauma service/SDU vs SICU  - supportive care  - GI/DVT prophylaxis  - pain management  - repeat studies as needed  - complete and follow up on trauma work up included but not limites to                          [x ] CXR [x ] PXR [x ] Extremities X-RAYs                          [x ] NCHCT [x ] C-Spine CT [x ] CT Chest [x ] CT Abdomen/Pelvis                          [x ] FAST [ ] Other                          [x ] Trauma Labs    [ ] Toxicology   - Follow up Consults  [ ] Neurosurgery [ ] Orthopaedics [ ] Plastics [ ] Fascial/OMFS [ ] Opthalmology [ ] Medicine [ ] Geriatrics [ ] Cardiology/EP                                          [ ] Urology  [ ] ENT                                         [ ] Pediatric ICU  [ ] SICU/SDU [ ] Hospice/Palliative Care  - IV ABx give as indicated [ ] Yes [x ] No  - Tetanus given as indicated [ ] Yes [ x] No  - Seizures prophylaxis [ ] Yes,  [x ] No  - hypotension     Maritza Bruce MD, FACS  Trauma/ACS/Surgical Critical Care Attending Trauma Attending H&P Attestation    Patient seen and evaluated with the trauma team in the trauma bay upon arrival. All pertinent labs and radiographic imaging reviewed, pending final reports. Outpatient medications reviewed, including the presence of anticoagulants, if applicable. I agree with the resident's note above, including the physical exam findings, assessment and plan as documented with the following adjustments.     Trauma Level: [ ] Code  [x ] Alert  [ ] Consult [ ] Transfer in  Activation by:  [x ] ED physician [x ] EMS  Intubated in Field? [ ] Yes [x ] No  Intubated in ED? [ ] Yes [x ] No  Intubated in Trauma [place]? [ ] Yes [x ] No    BRIANNE DELACRUZ Patient is a 89y old  Female who presents with a chief complaint of Fall at home and sustained pelvic fracture      Patient presented with GCS [14 ]  upon arrival to the trauma bay.  Allergies  iodine (Rash)  lisinopril (Rash)  Intolerances    On AC/Antiplatelets [x ] Yes [ ] No              [x ] NOVACs, [ ] Coumadin, [ ] ASA, [ ] Antiplatelets     PE: lethargic    Assessment: Fall with pelvic fracture                              Coagulopathy     PLAN  - Admit to Trauma service/SDU vs SICU  - supportive care  - GI/DVT prophylaxis  - pain management  - repeat studies as needed  - complete and follow up on trauma work up included but not limites to                          [x ] CXR [x ] PXR [x ] Extremities X-RAYs                          [x ] NCHCT [x ] C-Spine CT [x ] CT Chest [x ] CT Abdomen/Pelvis                          [x ] FAST [ ] Other                          [x ] Trauma Labs    [ ] Toxicology   - Follow up Consults  [ ] Neurosurgery [ x] Orthopaedics [ ] Plastics [ ] Fascial/OMFS [ ] Opthalmology [ x] Medicine [ ] Geriatrics [ ] Cardiology/EP                                          [ ] Urology  [ ] ENT                                         [ ] Pediatric ICU  [x ] SICU/SDU [ ] Hospice/Palliative Care  - IV ABx give as indicated [ ] Yes [x ] No  - Tetanus given as indicated [ ] Yes [ x] No  - Seizures prophylaxis [ ] Yes,  [x ] No  - hypotension     Maritza Bruce MD, FACS  Trauma/ACS/Surgical Critical Care Attending

## 2023-02-06 NOTE — CONSULT NOTE ADULT - SUBJECTIVE AND OBJECTIVE BOX
HPI:  89yF w/ PMHx of HTN, DM, Dementia seen as  Trauma Alert s/p unwitnessed fall, ?ht, -loc, +eliquis. The patient was in her rocking chair when a family member heard a thud-the patient is unable to describe the events but a family member was at bedside stating she did not have positive head strike. The patient has no external signs of trauma and denies any pain. The patient was found to have pelvic fractures on trauma work up and admitted to the trauma service for rehab purposes. Orthopedics recommends non-op management.  (2023 04:06). Pt denies back pain, radicular pain. < from: CT Abdomen and Pelvis No Cont (23 @ 22:15) >  3. Inferior plate compression fracture of L3. There is   prevertebral/periaortic fat stranding in the same region incidentally in   close proximity to the intrarenal aortic aneurysm (series 6 image 319).   Presumably this stranding associated with the fracture suggesting acuity.   Correlate for point tenderness. Please note vascular injury cannot be   excluded on this exam due to lack of intravenous contrast.    < end of copied text >    PAST MEDICAL & SURGICAL HISTORY:  HTN (hypertension)  DM (diabetes mellitus)  CAD (coronary artery disease)  Afib  Bladder cancer  in remission  NHL (non-Hodgkin&#x27;s lymphoma)  in remission  HLD (hyperlipidemia)  Glaucoma  Cataract  Hypothyroidism  Congestive heart failure of unknown etiology  Congestive heart failure of unknown etiology  History of total hysterectomy      Home Medications:  candesartan 4 mg oral tablet: 4 tab(s) orally once a day (2023 05:07)  carvedilol 6.25 mg oral tablet: 1 tab(s) orally 2 times a day (2023 04:18)  donepezil 10 mg oral tablet: 1 tab(s) orally once a day (at bedtime) (2023 04:18)  Eliquis 2.5 mg oral tablet: 1 tab(s) orally 2 times a day (2023 04:18)  isosorbide mononitrate 30 mg oral tablet, extended release: 1 tab(s) orally once a day (in the morning) (2023 04:18)  levothyroxine 25 mcg (0.025 mg) oral tablet: 1 tab(s) orally once a day (2023 04:18)  memantine 5 mg oral tablet: orally once a day (2023 04:18)  methenamine hippurate 1 g oral tablet: 1 tab(s) orally 2 times a day (2023 05:07)  torsemide 20 mg oral tablet: 1 tab(s) orally once a day (2023 05:07)      Allergies    iodine (Rash)  lisinopril (Rash)    Intolerances    ROS:  [X] A ten-point review of systems is negative except as noted   [  ] Due to altered mental status/intubation, subjective information were not able to be obtained from the patient. History was obtained, to the extent possible, from review of the chart and collateral sources of information    MEDICATIONS  (STANDING):  acetaminophen     Tablet .. 650 milliGRAM(s) Oral every 6 hours  cefepime   IVPB      cefepime   IVPB 1000 milliGRAM(s) IV Intermittent once  dextrose 50% Injectable 25 Gram(s) IV Push once  dextrose 50% Injectable 12.5 Gram(s) IV Push once  dextrose 50% Injectable 25 Gram(s) IV Push once  donepezil 10 milliGRAM(s) Oral at bedtime  insulin lispro (ADMELOG) corrective regimen sliding scale   SubCutaneous three times a day before meals  lactated ringers. 1000 milliLiter(s) (50 mL/Hr) IV Continuous <Continuous>  levothyroxine 25 MICROGram(s) Oral daily  memantine 5 milliGRAM(s) Oral daily  norepinephrine Infusion 0.05 MICROgram(s)/kG/Min (6.09 mL/Hr) IV Continuous <Continuous>    MEDICATIONS  (PRN):      ICU Vital Signs Last 24 Hrs  T(C): 36.8 (2023 16:00), Max: 36.8 (2023 16:00)  T(F): 98.3 (2023 16:00), Max: 98.3 (2023 16:00)  HR: 61 (2023 19:00) (57 - 115)  BP: 114/67 (2023 18:20) (70/47 - 124/70)  BP(mean): 84 (2023 18:20) (54 - 93)  ABP: 123/47 (2023 19:00) (123/47 - 123/47)  ABP(mean): 64 (2023 19:00) (64 - 64)  RR: 16 (2023 19:00) (16 - 18)  SpO2: 99% (2023 19:00) (88% - 100%)    O2 Parameters below as of 2023 16:00  Patient On (Oxygen Delivery Method): nasal cannula    O2 Concentration (%): 2        I&O's Detail    2023 07:01  -  2023 19:12  --------------------------------------------------------  IN:    Lactated Ringers: 350 mL  Total IN: 350 mL    OUT:    Ureteral Catheter (mL): 100 mL  Total OUT: 100 mL    Total NET: 250 mL                                11.1   8.71  )-----------( 162      ( 2023 13:41 )             34.9     02-06    134<L>  |  97<L>  |  40<H>  ----------------------------<  128<H>  4.3   |  24  |  1.6<H>    Ca    9.8      2023 10:30  Phos  4.4     02-06  Mg     2.0     02-06    TPro  7.0  /  Alb  3.4<L>  /  TBili  0.6  /  DBili  x   /  AST  142<H>  /  ALT  72<H>  /  AlkPhos  216<H>  02-05        Urinalysis Basic - ( 2023 09:39 )    Color: Yellow / Appearance: Slightly Turbid / S.027 / pH: x  Gluc: x / Ketone: Negative  / Bili: Negative / Urobili: <2 mg/dL   Blood: x / Protein: 30 mg/dL / Nitrite: Negative   Leuk Esterase: Large / RBC: 24 /HPF /  /HPF   Sq Epi: x / Non Sq Epi: 1 /HPF / Bacteria: Many        On PE:    Strength 5/5 RLE   5/5 distal LLE  Sensation intact to light touch  KJ 1+  LROM due to pelvic pain      Assessment:  As above    Plan:  No Neurosurgical Intervention  Abdominal Binder for comfort while in hospital  TLSO Brace for comfort upon discharge  Cleared for OOB with Physical Therapy now

## 2023-02-06 NOTE — H&P ADULT - HISTORY OF PRESENT ILLNESS
89yF w/ PMHx of HTN, DM, Dementia seen as  Trauma Alert s/p unwitnessed fall, ?ht, -loc, +eliquis. The patient was in her rocking chair when a family member heard a thud-the patient is unable to describe the events but a family member was at bedside stating she did not have positive head strike. The patient has no external signs of trauma and denies any pain. The patient was found to have pelvic fractures on trauma work up and admitted to the trauma service for rehab purposes. Orthopedics recommends non-op management.

## 2023-02-06 NOTE — ED ADULT NURSE REASSESSMENT NOTE - NS ED NURSE REASSESS COMMENT FT1
pt no longer in rapid a-fib, [phone] updated on pt status. lopressor no longer required at this time. will continue to monitor

## 2023-02-06 NOTE — CONSULT NOTE ADULT - ASSESSMENT
1. Trauma Pt s/p Fall complicated with   ->Pelvic fracture with suspected bleed - under Trauma service - care per trauma surgery / SICU care  ->Compression fracture L3 - f/u Neurosx consult     2. Septic Shock 2/2 UTI (+LA, +UA, +hypotension SBP 70 in SICU)   - LR Fluid resuccitation 30cc/kg if still hypotensive c/w levophed  - f/u LA trend to normalize it   - Meropenem 1g IV q8h   - Urine cxs  - Blood cxs  - ID consult     3. Rule out hemorrhagic shock - serial cbc / trauma f/u   4. Lung Fibrosis on CT f/u outpt Pulmonary   5. Trace Pericardial Effusion - rec get TTE  6. Non Obstruction L nephrolithiasis   7. Atrophic Rt Kidney  8. 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 Trauma Pt s/p Fall complicated with   ->Pelvic fracture with suspected bleed - under Trauma service - care per trauma surgery / SICU care  ->Compression fracture L3 - f/u Neurosx consult     2. Septic Shock 2/2 UTI (+LA, +UA, +hypotension SBP 70 in SICU)   - LR Fluid resuccitation 30cc/kg if still hypotensive c/w levophed  - f/u LA trend to normalize it   - Meropenem 1g IV q8h   - Urine cxs  - Blood cxs  - ID consult     3. Rule out hemorrhagic shock - serial cbc / trauma f/u   4. Lung Fibrosis on CT f/u outpt Pulmonary   5. Trace Pericardial Effusion - rec get TTE  6. Non Obstruction L nephrolithiasis   7. Atrophic Rt Kidney  8. 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 Chronic Afib on Eliquis -> would hold eliquis in the interim and rule out hemorrhagic shock as bleed in pelvis on trauma imaging     Hold BP Meds: candersartan / torsemide / coreg   Resume all other meds on home list     Pls address Goals Of Care Discussion with Kirill Chaney [facility] [phone] (lives w/ pt)     GI/DVT Proph: Protonix / Heparin sq bid             1. Trauma Pt s/p Fall complicated with   ->Pelvic fracture with suspected bleed - under Trauma service - care per trauma surgery / SICU care  ->Compression fracture L3 - f/u Neurosx consult     2. Septic Shock 2/2 UTI (+LA, +UA, +hypotension SBP 70 in SICU)   - LR Fluid resuccitation 30cc/kg if still hypotensive c/w levophed  - f/u LA trend to normalize it   - Cefepime 1g IV q8h 1st STAT ORDERED   - Urine cxs  - Blood cxs  - ID consult     3. Rule out hemorrhagic shock - serial cbc / trauma f/u   4. Lung Fibrosis on CT f/u outpt Pulmonary   5. Trace Pericardial Effusion - rec get TTE  6. Non Obstruction L nephrolithiasis   7. Atrophic Rt Kidney  8. 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 Chronic Afib on Eliquis -> would hold eliquis in the interim and rule out hemorrhagic shock as bleed in pelvis on trauma imaging     Hold BP Meds: candersartan / torsemide / coreg   Resume all other meds on home list     Pls address Goals Of Care Discussion with Kirill Chaney [facility] [phone] (lives w/ pt)     GI/DVT Proph: Protonix / Heparin sq bid             1. Trauma Pt s/p Fall complicated with   ->Pelvic fracture with suspected bleed - under Trauma service - care per trauma surgery / SICU care  ->Compression fracture L3 - f/u Neurosx consult     2. Septic Shock 2/2 UTI (+LA, +UA, +hypotension SBP 70 in SICU)   - LR Fluid resuccitation 30cc/kg if still hypotensive c/w levophed  - f/u LA trend to normalize it   - Cefepime 1g IV q8h 1st STAT ORDERED   - Urine cxs  - Blood cxs  - ID consult     3. Rule out hemorrhagic shock - serial cbc / trauma f/u   4. Lung Fibrosis on CT f/u outpt Pulmonary   5. Trace Pericardial Effusion - rec get TTE  6. Non Obstruction L nephrolithiasis   7. 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 Chronic Afib on Eliquis   - would hold eliquis in the interim and rule out hemorrhagic shock as bleed in pelvis on trauma imaging  - would hold coreg   -> Hypotension on pressors      Hold BP Meds: candersartan / torsemide / coreg   Resume all other meds on home list     Pls address Goals Of Care Discussion with Kirill Chaney [phone] (lives w/ pt)     GI/DVT Proph: Protonix / Heparin sq bid

## 2023-02-06 NOTE — CONSULT NOTE ADULT - CONSULT REASON
hypotension s/p pelvic fracture hypotension s/p left superior pubic rami fracture with 1.5cm displacement and endplate L3 fracture

## 2023-02-06 NOTE — PATIENT PROFILE ADULT - FALL HARM RISK - HARM RISK INTERVENTIONS
Assistance with ambulation/Assistance OOB with selected safe patient handling equipment/Communicate Risk of Fall with Harm to all staff/Discuss with provider need for PT consult/Monitor for mental status changes/Monitor gait and stability/Move patient closer to nurses' station/Provide patient with walking aids - walker, cane, crutches/Reinforce activity limits and safety measures with patient and family/Reorient to person, place and time as needed/Tailored Fall Risk Interventions/Toileting schedule using arm’s reach rule for commode and bathroom/Use of alarms - bed, chair and/or voice tab/Visual Cue: Yellow wristband and red socks/Bed in lowest position, wheels locked, appropriate side rails in place/Call bell, personal items and telephone in reach/Instruct patient to call for assistance before getting out of bed or chair/Non-slip footwear when patient is out of bed/[place] to call system/Physically safe environment - no spills, clutter or unnecessary equipment/Purposeful Proactive Rounding/Room/bathroom lighting operational, light cord in reach

## 2023-02-06 NOTE — H&P ADULT - NSHPLABSRESULTS_GEN_ALL_CORE
Labs:  CAPILLARY BLOOD GLUCOSE      POCT Blood Glucose.: 155 mg/dL (05 Feb 2023 19:34)                          12.2   16.73 )-----------( 265      ( 05 Feb 2023 21:02 )             38.8       Auto Neutrophil %: 67.9 % (02-05-23 @ 21:02)  Auto Immature Granulocyte %: 1.4 % (02-05-23 @ 21:02)    02-05    136  |  96<L>  |  40<H>  ----------------------------<  175<H>  4.5   |  29  |  1.5      Calcium, Total Serum: 10.0 mg/dL (02-05-23 @ 21:02)      LFTs:             7.0  | 0.6  | 142      ------------------[216     ( 05 Feb 2023 21:02 )  3.4  | x    | 72          Lipase:70     Amylase:x         Lactate, Blood: 2.9 mmol/L (02-05-23 @ 21:02)      Coags:     15.50  ----< 1.35    ( 05 Feb 2023 21:02 )     29.8        < from: CT Abdomen and Pelvis No Cont (02.05.23 @ 22:15) >    IMPRESSION:    1. Comminuted fracture of the left superior pubic ramus with up to   approximately 1 to 1.5 cm medial displacement of the ramus fracture   fragment. Fracture line extends to the pubic tubercle. Pubic symphysis   articulation appears intact. Associated small left pelvic hemorrhage.    2. Nondisplaced mid left inferior pubic ramus comminuted fracture.    3. Inferior plate compression fracture of L3. There is   prevertebral/periaortic fat stranding in the same region incidentally in   close proximity to the intrarenal aortic aneurysm (series 6 image 319).   Presumably this stranding associated with the fracture suggesting acuity.   Correlate for point tenderness. Please note vascular injury cannot be   excluded on this exam due to lack of intravenous contrast.    4. Nonspecific bilateral lung fibrotic changes with slight upper lung   predominant.    Spoke with MICHAEL BERGER MD; Resident Rad on 2/6/2023 12:17 AM with   readback.    --- End of Report ---    < end of copied text >    < from: CT Cervical Spine No Cont (02.05.23 @ 21:50) >    Impression:    No acute fracture or subluxation of the cervical spine.    --- End of Report ---    < end of copied text >    < from: CT Head No Cont (02.05.23 @ 21:50) >    IMPRESSION:    No CT evidence of acute intracranial pathology.    --- End of Report ---    < end of copied text >

## 2023-02-06 NOTE — CONSULT NOTE ADULT - SUBJECTIVE AND OBJECTIVE BOX
BRIANNE DELACRUZ     MRN-[id number]  89y ([birth year])F      Admit Date/LOS: 02-06  Indication for SDU/SICU: hypotension s/p pelvic fracture         ============================  HPI   89 yo F PMH HTN, DM, Afib on eliquis, dementia, CAD s/p PCI x3 2003, HTN, DLD, COPD on 2L NC, glaucoma/cataract, bladder cancer s/p laser 2016,  non hodgkin's lymphoma in remission last chemotherapy April 2018, vascular dementia, stage 3 CKD (baseline Cr 1.6), seen as  Trauma Alert 02/05 night s/p unwitnessed fall, ?ht, -loc, +eliquis. The patient was in her rocking chair when a family member heard a thud-the patient is unable to describe the events but a family member was at bedside stating she did not have positive head strike. The patient has no external signs of trauma and denies any pain. The patient was found to have left superior pubic rami fracture with 1.5cm displacement and endplate L3 fracture on trauma work up and admitted to the trauma service for rehab purposes. Orthopedics recommends non-op management.   SICU consulted for SDU evaluation due to patient's hypotension. Patient became hypotensive with SBP in 70s around 8am this morning; trauma team was notified and patient received 1L LR bolus.   Patient evaluated in ED by SICU team with attending bedside. Patient's BP noted to improve after administration of bolus, BP 96/52 at time of examination. Patient was A&O to person and place, and did not endorse any pain, SOB, CP, numbness, or tingling at time of examination. No external deformities of the legs noted on examination. Patient was able to move all extremities. Thigh compartments soft and compressible. After discussion with attending, patient approved for transfer to SICU for hemodynamic monitoring.     Consults-  Consult Note Adult-Orthopedics Resident [DIGNA Martines] (02-05-23)  Consult Note Adult-Trauma Surgery Physician Assistant/Attend [DIGNA Quintero] (02-05-23)       24 Hour Events  -SBP 70s-80s, responded to 90s after 1L bolus  -Admission under SICU service      [X] A ten-point review of systems was otherwise negative except as noted above.  [  ] Due to altered mental status/intubation, subjective information was not attained from the patient. History was obtained, to the extent possible, from review of the chart and collateral sources of information.    =========================================================================================================================================      PMH  PAST MEDICAL & SURGICAL HISTORY:  HTN (hypertension)      DM (diabetes mellitus)      CAD (coronary artery disease)      Afib      Bladder cancer  in remission      NHL (non-Hodgkin&#x27;s lymphoma)  in remission      HLD (hyperlipidemia)      Glaucoma      Cataract      Hypothyroidism      History of total hysterectomy        Home Meds:  Home Medications:  candesartan 4 mg oral tablet: 4 tab(s) orally once a day (06 Feb 2023 05:07)  carvedilol 6.25 mg oral tablet: 1 tab(s) orally 2 times a day (06 Feb 2023 04:18)  donepezil 10 mg oral tablet: 1 tab(s) orally once a day (at bedtime) (06 Feb 2023 04:18)  Eliquis 2.5 mg oral tablet: 1 tab(s) orally 2 times a day (06 Feb 2023 04:18)  isosorbide mononitrate 30 mg oral tablet, extended release: 1 tab(s) orally once a day (in the morning) (06 Feb 2023 04:18)  levothyroxine 25 mcg (0.025 mg) oral tablet: 1 tab(s) orally once a day (06 Feb 2023 04:18)  memantine 5 mg oral tablet: orally once a day (06 Feb 2023 04:18)  methenamine hippurate 1 g oral tablet: 1 tab(s) orally 2 times a day (06 Feb 2023 05:07)  torsemide 20 mg oral tablet: 1 tab(s) orally once a day (06 Feb 2023 05:07)     Allergies  Allergies    No Known Allergies    Intolerances       Current Medications:  acetaminophen     Tablet .. 650 milliGRAM(s) Oral every 6 hours  carvedilol 6.25 milliGRAM(s) Oral every 12 hours  donepezil 10 milliGRAM(s) Oral at bedtime  gabapentin 100 milliGRAM(s) Oral three times a day  lactated ringers. 1000 milliLiter(s) (50 mL/Hr) IV Continuous <Continuous>  levothyroxine 25 MICROGram(s) Oral daily  memantine 5 milliGRAM(s) Oral daily      VITAL SIGNS, INS/OUTS (Last 24Hours)  ICU Vital Signs Last 24 Hrs  T(C): 36.3 (06 Feb 2023 08:25), Max: 36.7 (05 Feb 2023 19:40)  T(F): 97.3 (06 Feb 2023 08:25), Max: 98 (05 Feb 2023 19:40)  HR: 72 (06 Feb 2023 09:30) (67 - 115)  BP: 76/50 (06 Feb 2023 09:30) (71/47 - 120/81)  BP(mean): --  ABP: --  ABP(mean): --  RR: 16 (06 Feb 2023 09:30) (16 - 18)  SpO2: 99% (06 Feb 2023 09:30) (92% - 99%)    O2 Parameters below as of 06 Feb 2023 09:30  Patient On (Oxygen Delivery Method): nasal cannula          I&O's Summary          Physical Exam:   ----------------------------------------------------------------------------------------------------------  GCS: 15  Exam: A&O to person and place, unaware of day/year/season. No focal deficits.     RESPIRATORY: Saturating well on 2.5L NC. CTAB. Normal respiratory effort/expansion.    CARDIOVASCULAR: S1/S2. Normal RR.    GASTROINTESTINAL:  Abdomen soft, non-tender, non-distended    MUSCULOSKELETAL:  Extremities warm, pink, well-perfused. +DP b/l. No swelling/erythema/ecchymoses or deformities/lacerations/abrasions of the extremities noted. Slight TTP over L superior pubic ramus. Pt able to bend and straighten both legs.     DERM:  No skin breakdown. Scar from previous chemoport noted below R clavicle.     :   Exam: Fortune catheter in place.       Weight (kg): 65 (02-06-23)    Tubes/Lines/Drains   ----------------------------------------------------------------------------------------------------------  [x] Peripheral IV  [X] Urinary Catheter Fortune                                                   BRIANNE DELACRUZ     MRN-[id number]  89y ([birth year])F      Admit Date/LOS: 02-06  Indication for SDU/SICU: hypotension s/p pelvic fracture         ============================  HPI   89 yo F PMH HTN, DM, Afib on eliquis, dementia, CAD s/p PCI x3 2003, HTN, DLD, COPD on 2L NC, glaucoma/cataract, bladder cancer s/p laser 2016,  non hodgkin's lymphoma in remission last chemotherapy April 2018, vascular dementia, stage 3 CKD (baseline Cr 1.6), seen as  Trauma Alert 02/05 night s/p unwitnessed fall, ?ht, -loc, +eliquis. The patient was in her rocking chair when a family member heard a thud-the patient is unable to describe the events but a family member was at bedside stating she did not have positive head strike. The patient has no external signs of trauma and denies any pain. The patient was found to have left superior pubic rami fracture with 1.5cm displacement and endplate L3 fracture on trauma work up and admitted to the trauma service for rehab purposes. Orthopedics recommends non-op management.   SICU consulted for SDU evaluation due to patient's hypotension. Patient became hypotensive with SBP in 70s around 8am this morning; trauma team was notified and patient received 1L LR bolus.   Patient evaluated in ED by SICU team with attending bedside. Patient's BP noted to improve after administration of bolus, BP 96/52 at time of examination. Patient was A&O to person and place, and did not endorse any pain, SOB, CP, numbness, or tingling at time of examination. No external deformities of the legs noted on examination. Patient was able to move all extremities. Thigh compartments soft and compressible. After discussion with attending, patient approved for transfer to SICU for hemodynamic monitoring.     Consults-  Consult Note Adult-Orthopedics Resident [DIGNA Martines] (02-05-23)  Consult Note Adult-Trauma Surgery Physician Assistant/Attend [DIGNA Quintero] (02-05-23)       24 Hour Events  -SBP 70s-80s, responded to 90s after 1L bolus  -Admission under SICU service      [X] A ten-point review of systems was otherwise negative except as noted above.  [  ] Due to altered mental status/intubation, subjective information was not attained from the patient. History was obtained, to the extent possible, from review of the chart and collateral sources of information.    =========================================================================================================================================      PMH  PAST MEDICAL & SURGICAL HISTORY:  HTN (hypertension)      DM (diabetes mellitus)      CAD (coronary artery disease)      Afib      Bladder cancer  in remission      NHL (non-Hodgkin&#x27;s lymphoma)  in remission      HLD (hyperlipidemia)      Glaucoma      Cataract      Hypothyroidism      History of total hysterectomy        Home Meds:  Home Medications:  candesartan 4 mg oral tablet: 4 tab(s) orally once a day (06 Feb 2023 05:07)  carvedilol 6.25 mg oral tablet: 1 tab(s) orally 2 times a day (06 Feb 2023 04:18)  donepezil 10 mg oral tablet: 1 tab(s) orally once a day (at bedtime) (06 Feb 2023 04:18)  Eliquis 2.5 mg oral tablet: 1 tab(s) orally 2 times a day (06 Feb 2023 04:18)  isosorbide mononitrate 30 mg oral tablet, extended release: 1 tab(s) orally once a day (in the morning) (06 Feb 2023 04:18)  levothyroxine 25 mcg (0.025 mg) oral tablet: 1 tab(s) orally once a day (06 Feb 2023 04:18)  memantine 5 mg oral tablet: orally once a day (06 Feb 2023 04:18)  methenamine hippurate 1 g oral tablet: 1 tab(s) orally 2 times a day (06 Feb 2023 05:07)  torsemide 20 mg oral tablet: 1 tab(s) orally once a day (06 Feb 2023 05:07)     Allergies  Allergies    No Known Allergies    Intolerances       Current Medications:  acetaminophen     Tablet .. 650 milliGRAM(s) Oral every 6 hours  carvedilol 6.25 milliGRAM(s) Oral every 12 hours  donepezil 10 milliGRAM(s) Oral at bedtime  gabapentin 100 milliGRAM(s) Oral three times a day  lactated ringers. 1000 milliLiter(s) (50 mL/Hr) IV Continuous <Continuous>  levothyroxine 25 MICROGram(s) Oral daily  memantine 5 milliGRAM(s) Oral daily      VITAL SIGNS, INS/OUTS (Last 24Hours)  ICU Vital Signs Last 24 Hrs  T(C): 36.3 (06 Feb 2023 08:25), Max: 36.7 (05 Feb 2023 19:40)  T(F): 97.3 (06 Feb 2023 08:25), Max: 98 (05 Feb 2023 19:40)  HR: 72 (06 Feb 2023 09:30) (67 - 115)  BP: 76/50 (06 Feb 2023 09:30) (71/47 - 120/81)  BP(mean): --  ABP: --  ABP(mean): --  RR: 16 (06 Feb 2023 09:30) (16 - 18)  SpO2: 99% (06 Feb 2023 09:30) (92% - 99%)    O2 Parameters below as of 06 Feb 2023 09:30  Patient On (Oxygen Delivery Method): nasal cannula          I&O's Summary          Physical Exam:   ----------------------------------------------------------------------------------------------------------  GCS: 15  Exam: A&O to person and place, unaware of day/year/season. No focal deficits.     RESPIRATORY: Saturating well on 2.5L NC. CTAB. Normal respiratory effort/expansion.    CARDIOVASCULAR: S1/S2. Normal RR.    GASTROINTESTINAL:  Abdomen soft, non-tender, non-distended    MUSCULOSKELETAL:  Extremities warm, pink, well-perfused. +DP b/l. No swelling/erythema/ecchymoses or deformities/lacerations/abrasions of the extremities noted. Slight TTP over L superior pubic ramus. Pt able to bend and straighten both legs.     DERM:  No skin breakdown. Scar from previous chemoport noted below R clavicle.     :   Exam: Fortune catheter in place.       Weight (kg): 65 (02-06-23)    Tubes/Lines/Drains   ----------------------------------------------------------------------------------------------------------  [x] Peripheral IV  [X] Urinary Catheter Fortune                                                   BRIANNE DELACRUZ     MRN-[id number]  89y ([birth year])F      Admit Date/LOS: 02-06  Indication for SDU/SICU: hypotension s/p left superior pubic rami fracture with 1.5cm displacement and endplate L3 fracture    ============================  HPI   89 yo F PMH HTN, DM, Afib on eliquis, dementia, CAD s/p PCI x3 2003, HTN, DLD, COPD on 2L NC, glaucoma/cataract, bladder cancer s/p laser 2016,  non hodgkin's lymphoma in remission last chemotherapy April 2018, vascular dementia, stage 3 CKD (baseline Cr 1.6), seen as  Trauma Alert 02/05 night s/p unwitnessed fall, ?ht, -loc, +eliquis. The patient was in her rocking chair when a family member heard a thud-the patient is unable to describe the events but a family member was at bedside stating she did not have positive head strike. The patient has no external signs of trauma and denies any pain. The patient was found to have left superior pubic rami fracture with 1.5cm displacement and endplate L3 fracture on trauma work up and admitted to the trauma service for rehab purposes. Orthopedics recommends non-op management.     SICU consulted for SDU evaluation due to patient's hypotension. Patient became hypotensive with SBP in 70s around 8am this morning; trauma team was notified and patient received 1L LR bolus.   Patient evaluated in ED by SICU team with attending bedside. Patient's BP noted to improve after administration of bolus, BP 96/52 at time of examination. Patient was A&O to person and place, and did not endorse any pain, SOB, CP, numbness, or tingling at time of examination. No external deformities of the legs noted on examination. Patient was able to move all extremities. Thigh compartments soft and compressible. After discussion with attending, patient approved for transfer to SICU for hemodynamic monitoring.      24 Hour Events  -SBP 70s-80s, responded to 90s after 1L bolus  -Admission under SICU service      [X] A ten-point review of systems was otherwise negative except as noted above.  [  ] Due to altered mental status/intubation, subjective information was not attained from the patient. History was obtained, to the extent possible, from review of the chart and collateral sources of information.    =========================================================================================================================================      PMH  PAST MEDICAL & SURGICAL HISTORY:  HTN (hypertension)      DM (diabetes mellitus)      CAD (coronary artery disease)      Afib      Bladder cancer  in remission      NHL (non-Hodgkin&#x27;s lymphoma)  in remission      HLD (hyperlipidemia)      Glaucoma      Cataract      Hypothyroidism      History of total hysterectomy        Home Meds:  Home Medications:  candesartan 4 mg oral tablet: 4 tab(s) orally once a day (06 Feb 2023 05:07)  carvedilol 6.25 mg oral tablet: 1 tab(s) orally 2 times a day (06 Feb 2023 04:18)  donepezil 10 mg oral tablet: 1 tab(s) orally once a day (at bedtime) (06 Feb 2023 04:18)  Eliquis 2.5 mg oral tablet: 1 tab(s) orally 2 times a day (06 Feb 2023 04:18)  isosorbide mononitrate 30 mg oral tablet, extended release: 1 tab(s) orally once a day (in the morning) (06 Feb 2023 04:18)  levothyroxine 25 mcg (0.025 mg) oral tablet: 1 tab(s) orally once a day (06 Feb 2023 04:18)  memantine 5 mg oral tablet: orally once a day (06 Feb 2023 04:18)  methenamine hippurate 1 g oral tablet: 1 tab(s) orally 2 times a day (06 Feb 2023 05:07)  torsemide 20 mg oral tablet: 1 tab(s) orally once a day (06 Feb 2023 05:07)     Allergies  Allergies    No Known Allergies    Intolerances       Current Medications:  acetaminophen     Tablet .. 650 milliGRAM(s) Oral every 6 hours  carvedilol 6.25 milliGRAM(s) Oral every 12 hours  donepezil 10 milliGRAM(s) Oral at bedtime  gabapentin 100 milliGRAM(s) Oral three times a day  lactated ringers. 1000 milliLiter(s) (50 mL/Hr) IV Continuous <Continuous>  levothyroxine 25 MICROGram(s) Oral daily  memantine 5 milliGRAM(s) Oral daily      VITAL SIGNS, INS/OUTS (Last 24Hours)  ICU Vital Signs Last 24 Hrs  T(C): 36.3 (06 Feb 2023 08:25), Max: 36.7 (05 Feb 2023 19:40)  T(F): 97.3 (06 Feb 2023 08:25), Max: 98 (05 Feb 2023 19:40)  HR: 72 (06 Feb 2023 09:30) (67 - 115)  BP: 76/50 (06 Feb 2023 09:30) (71/47 - 120/81)  BP(mean): --  ABP: --  ABP(mean): --  RR: 16 (06 Feb 2023 09:30) (16 - 18)  SpO2: 99% (06 Feb 2023 09:30) (92% - 99%)    O2 Parameters below as of 06 Feb 2023 09:30  Patient On (Oxygen Delivery Method): nasal cannula          I&O's Summary          Physical Exam:   ----------------------------------------------------------------------------------------------------------  GCS: 15  Exam: A&O to person and place, unaware of day/year/season. No focal deficits.     RESPIRATORY: Saturating well on 2.5L NC. CTAB. Normal respiratory effort/expansion.    CARDIOVASCULAR: S1/S2. Normal RR.    GASTROINTESTINAL:  Abdomen soft, non-tender, non-distended    MUSCULOSKELETAL:  Extremities warm, pink, well-perfused. +DP b/l. No swelling/erythema/ecchymoses or deformities/lacerations/abrasions of the extremities noted. Slight TTP over L superior pubic ramus. Pt able to bend and straighten both legs.     DERM:  No skin breakdown. Scar from previous chemoport noted below R clavicle.     :   Exam: Fortune catheter in place.       Weight (kg): 65 (02-06-23)    Tubes/Lines/Drains   ----------------------------------------------------------------------------------------------------------  [x] Peripheral IV  [X] Urinary Catheter Fortune

## 2023-02-06 NOTE — CHART NOTE - NSCHARTNOTEFT_GEN_A_CORE
Spoke with POA and daughter Sarah Chaney via phone. Per pt's daughter, patient was hospitalized with CHF at [facility]. There, patient was found to have EF of 25-30%. Around that time, patient was also placed on metoprolol, in addition to her previous carvedilol. Daughter reports that patient's candesartan and isosorbide were discontinued in January as well. She states that patient's BP is usually around  systolic. Patient's main cardiologist is Dr. Dev Worley, but also follows with Dr. Salazar while in [place].

## 2023-02-06 NOTE — CONSULT NOTE ADULT - SUBJECTIVE AND OBJECTIVE BOX
HPI:  89yF w/ PMHx of HTN, DM, Dementia seen as  Trauma Alert s/p unwitnessed fall, ?ht, -loc, +eliquis. The patient was in her rocking chair when a family member heard a thud-the patient is unable to describe the events but a family member was at bedside stating she did not have positive head strike. The patient has no external signs of trauma and denies any pain. The patient was found to have pelvic fractures on trauma work up and admitted to the trauma service for rehab purposes. Orthopedics recommends non-op management.      < from: CT Abdomen and Pelvis No Cont (23 @ 22:15) >    IMPRESSION:    1. Comminuted fracture of the left superior pubic ramus with up to   approximately 1 to 1.5 cm medial displacement of the ramus fracture   fragment. Fracture line extends to the pubic tubercle. Pubic symphysis   articulation appears intact. Associated small left pelvic hemorrhage.    2. Nondisplaced mid left inferior pubic ramus comminuted fracture.    3. Inferior plate compression fracture of L3. There is   prevertebral/periaortic fat stranding in the same region incidentally in   close proximity to the intrarenal aortic aneurysm (series 6 image 319).   Presumably this stranding associated with the fracture suggesting acuity.   Correlate for point tenderness. Please note vascular injury cannot be   excluded on this exam due to lack of intravenous contrast.    4. Nonspecific bilateral lung fibrotic changes with slight upper lung   predominant.    < from: CT Cervical Spine No Cont (23 @ 21:50) >    Impression:    No acute fracture or subluxation of the cervical spine.    --- End of Report ---    < end of copied text >    < from: CT Head No Cont (23 @ 21:50) >    IMPRESSION:    No CT evidence of acute intracranial pathology.    Weight bearing: WBAT LLE  No acute orthopaedic surgical intervention      medical charts / labs / imaging studies reviewed   Spoke with grandson at bedside regarding pt baseline function      PAST MEDICAL & SURGICAL HISTORY:  HTN (hypertension)      DM (diabetes mellitus)      CAD (coronary artery disease)      Afib      Bladder cancer  in remission      NHL (non-Hodgkin&#x27;s lymphoma)  in remission      HLD (hyperlipidemia)      Glaucoma      Cataract      Hypothyroidism      History of total hysterectomy          Hospital Course:    TODAY'S SUBJECTIVE & REVIEW OF SYMPTOMS:     Constitutional WNL   Cardio WNL   Resp WNL   GI WNL  Heme WNL  Endo WNL  Skin WNL  MSK pain  Neuro WNL  Cognitive confused   Psych WNL      MEDICATIONS  (STANDING):  acetaminophen     Tablet .. 650 milliGRAM(s) Oral every 6 hours  carvedilol 6.25 milliGRAM(s) Oral every 12 hours  dextrose 50% Injectable 25 Gram(s) IV Push once  dextrose 50% Injectable 12.5 Gram(s) IV Push once  dextrose 50% Injectable 25 Gram(s) IV Push once  donepezil 10 milliGRAM(s) Oral at bedtime  insulin lispro (ADMELOG) corrective regimen sliding scale   SubCutaneous three times a day before meals  lactated ringers. 1000 milliLiter(s) (50 mL/Hr) IV Continuous <Continuous>  levothyroxine 25 MICROGram(s) Oral daily  memantine 5 milliGRAM(s) Oral daily  norepinephrine Infusion 0.05 MICROgram(s)/kG/Min (6.09 mL/Hr) IV Continuous <Continuous>    MEDICATIONS  (PRN):      FAMILY HISTORY:  Family history of ischemic heart disease (IHD) (Mother)    Family history of stroke (Mother)        Allergies    iodine (Rash)  lisinopril (Rash)    Intolerances        SOCIAL HISTORY:    [  ] Etoh  [  ] Smoking  [  ] Substance abuse     Home Environment:  [   ] Home Alone  [ x  ] Lives with Family  [   ] Home Health Aid    Dwelling:  [   ] Apartment  [  x ] Private House  [   ] Adult Home  [   ] Skilled Nursing Facility      [   ] Short Term  [   ] Long Term  [  x ] Stairs       Elevator [   ]    FUNCTIONAL STATUS PTA: (Check all that apply)  Ambulation: [    ]Independent    [ x  ] Dependent     [   ] Non-Ambulatory  Assistive Device: [   ] SA Cane  [   ]  Q Cane  [   ] Walker  [   ]  Wheelchair  ADL : [   ] Independent  [  x  ]  Dependent       Vital Signs Last 24 Hrs  T(C): 36.8 (2023 16:00), Max: 36.8 (2023 16:00)  T(F): 98.3 (2023 16:00), Max: 98.3 (2023 16:00)  HR: 57 (2023 16:00) (57 - 115)  BP: 86/54 (2023 16:00) (70/47 - 120/81)  BP(mean): 65 (2023 16:00) (55 - 65)  RR: 17 (2023 16:00) (16 - 18)  SpO2: 100% (2023 16:00) (92% - 100%)    Parameters below as of 2023 16:00  Patient On (Oxygen Delivery Method): nasal cannula    O2 Concentration (%): 2      PHYSICAL EXAM: Awake & confused   GENERAL: NAD  HEAD:  Normocephalic  CHEST/LUNG: Clear   HEART: S1S2+  ABDOMEN: Soft, Nontender  EXTREMITIES:  no calf tenderness    NERVOUS SYSTEM:  Cranial Nerves 2-12 intact [   ] Abnormal  [   ]  ROM: WFL all extremities [   ]  Abnormal [  x ]limited LLE  Motor Strength: WFL all extremities  [   ]  Abnormal [ x  ]limited LLE  Sensation: intact to light touch [   ] Abnormal [   ]    FUNCTIONAL STATUS:  Bed Mobility: Independent [   ]  Supervision [   ]  Needs Assistance [  x ]  N/A [   ]  Transfers: Independent [   ]  Supervision [   ]  Needs Assistance [   ]  N/A [   ]   Ambulation: Independent [   ]  Supervision [   ]  Needs Assistance [   ]  N/A [   ]  ADL: Independent [   ] Requires Assistance [   ] N/A [   ]      LABS:                        11.1   8.71  )-----------( 162      ( 2023 13:41 )             34.9     02-06    134<L>  |  97<L>  |  40<H>  ----------------------------<  128<H>  4.3   |  24  |  1.6<H>    Ca    9.8      2023 10:30  Phos  4.4     02-06  Mg     2.0     02-06    TPro  7.0  /  Alb  3.4<L>  /  TBili  0.6  /  DBili  x   /  AST  142<H>  /  ALT  72<H>  /  AlkPhos  216<H>  02-05    PT/INR - ( 2023 21:02 )   PT: 15.50 sec;   INR: 1.35 ratio         PTT - ( 2023 21:02 )  PTT:29.8 sec  Urinalysis Basic - ( 2023 09:39 )    Color: Yellow / Appearance: Slightly Turbid / S.027 / pH: x  Gluc: x / Ketone: Negative  / Bili: Negative / Urobili: <2 mg/dL   Blood: x / Protein: 30 mg/dL / Nitrite: Negative   Leuk Esterase: Large / RBC: 24 /HPF /  /HPF   Sq Epi: x / Non Sq Epi: 1 /HPF / Bacteria: Many        RADIOLOGY & ADDITIONAL STUDIES:

## 2023-02-06 NOTE — CONSULT NOTE ADULT - SUBJECTIVE AND OBJECTIVE BOX
HOSPITALIST CONSULT NOTE     BRIANNE DELACRUZ  89y, Female  Allergy: iodine (Rash)  lisinopril (Rash)      CHIEF COMPLAINT: fall at home     HPI:  89yF w/ PMHx of HTN, DM, Dementia seen as  Trauma Alert s/p unwitnessed fall, ?ht, -loc, +eliquis. The patient was in her rocking chair when a family member heard a thud-the patient is unable to describe the events but a family member was at bedside stating she did not have positive head strike. The patient has no external signs of trauma and denies any pain. The patient was found to have pelvic fractures on trauma work up and admitted to the trauma service for rehab purposes. Orthopedics recommends non-op management.  (2023 04:06)      FAMILY HISTORY:  Family history of ischemic heart disease (IHD) (Mother)    Family history of stroke (Mother)    No pertinent past medical history      PAST MEDICAL & SURGICAL HISTORY:    HTN (hypertension)    DM (diabetes mellitus)    CAD (coronary artery disease)    Chronic Afib    Bladder cancer in remission    NHL (non-Hodgkin&#x27;s lymphoma) in remission    HLD (hyperlipidemia)    Glaucoma    Cataract    Hypothyroidism    Congestive heart failure of unknown etiology    Congestive heart failure of unknown etiology    Surgical h/x     History of total hysterectomy        SOCIAL HISTORY  Lives at home with family     Home Medications:  candesartan 4 mg oral tablet: 4 tab(s) orally once a day (2023 05:07) ONLY AS NEEDED DUE TO LOW BP PER FAMILY   carvedilol 6.25 mg oral tablet: 1 tab(s) orally 2 times a day (2023 04:18)   donepezil 10 mg oral tablet: 1 tab(s) orally once a day (at bedtime) (2023 04:18)  Eliquis 2.5 mg oral tablet: 1 tab(s) orally 2 times a day (2023 04:18)  isosorbide mononitrate 30 mg oral tablet, extended release: 1 tab(s) orally once a day (in the morning) (2023 04:18)  levothyroxine 25 mcg (0.025 mg) oral tablet: 1 tab(s) orally once a day (2023 04:18)  memantine 5 mg oral tablet: orally once a day (2023 04:18)  methenamine hippurate 1 g oral tablet: 1 tab(s) orally 2 times a day (2023 05:07)  torsemide 20 mg oral tablet: 1 tab(s) orally once a day (2023 05:07) ONLY AS NEEDED NOT DAILY       ROS  General: Denies fevers, chills, night sweats, weight loss  HEENT: Denies headache, rhinorrhea, sore throat, eye pain  CV: Denies CP, palpitations  PULM: Denies SOB, cough  GI: Denies abdominal pain, diarrhea  : Denies dysuria, hematuria  MSK: Denies arthralgias  SKIN: Denies rash   NEURO: Denies paresthesias, weakness  PSYCH: Denies depression    VITALS:  T(F): 98.3, Max: 98.3 (23 @ 16:00)  HR: 57  BP: 114/57  RR: 17Vital Signs Last 24 Hrs  T(C): 36.8 (2023 16:00), Max: 36.8 (2023 16:00)  T(F): 98.3 (2023 16:00), Max: 98.3 (2023 16:00)  HR: 57 (2023 16:00) (57 - 115)  BP: 114/57 (2023 17:55) (70/47 - 120/81)  BP(mean): 76 (2023 17:55) (54 - 81)  RR: 17 (2023 16:00) (16 - 18)  SpO2: 100% (2023 16:00) (92% - 100%)    Parameters below as of 2023 16:00  Patient On (Oxygen Delivery Method): nasal cannula    O2 Concentration (%): 2    PHYSICAL EXAM:  Gen: NAD, resting in bed  HEENT: Normocephalic, atraumatic  Neck: supple, no lymphadenopathy  CV: Regular rate & regular rhythm  Lungs: CTABL no wheeze  Abdomen: Soft, NTND+ BS present  Ext: Warm, well perfused no CCE  Neuro: non focal, awake, CN II-XII intact   Skin: no rash, no erythema  Psych: no SI, HI, Hallucination   MS: AOx1 (more or less at baseline per grandson at [facility])     TESTS & MEASUREMENTS:                        11.1   8.71  )-----------( 162      ( 2023 13:41 )             34.9     02-    134<L>  |  97<L>  |  40<H>  ----------------------------<  128<H>  4.3   |  24  |  1.6<H>    Ca    9.8      2023 10:30  Phos  4.4     02-  Mg     2.0     -    TPro  7.0  /  Alb  3.4<L>  /  TBili  0.6  /  DBili  x   /  AST  142<H>  /  ALT  72<H>  /  AlkPhos  216<H>        LIVER FUNCTIONS - ( 2023 21:02 )  Alb: 3.4 g/dL / Pro: 7.0 g/dL / ALK PHOS: 216 U/L / ALT: 72 U/L / AST: 142 U/L / GGT: x           Urinalysis Basic - ( 2023 09:39 )    Color: Yellow / Appearance: Slightly Turbid / S.027 / pH: x  Gluc: x / Ketone: Negative  / Bili: Negative / Urobili: <2 mg/dL   Blood: x / Protein: 30 mg/dL / Nitrite: Negative   Leuk Esterase: Large / RBC: 24 /HPF /  /HPF   Sq Epi: x / Non Sq Epi: 1 /HPF / Bacteria: Many    COVID-19 PCR: NotDetec (2023 21:16)    Lactate, Blood: 2.8 mmol/L (23 @ 15:27)  Lactate, Blood: 2.9 mmol/L (23 @ 21:02)    QRS axis to [] ° and NSR at a rate of [] BPM. There was no atrial enlargement. There was no ventricular hypertrophy. There were no ST-T changes and all intervals were normal.      RADIOLOGY & ADDITIONAL TESTS:  I have personally reviewed the last Chest xray  CXR  Xray Chest 1 View AP/PA:   ACC: [id number] EXAM:  XR CHEST 1 VIEW   ORDERED BY: MICHAEL BERGER     PROCEDURE DATE:  2023      INTERPRETATION:  Clinical History / Reason for exam: Trauma.    Comparison : Chest radiograph 2022. Correlation with same-day chest   CT.    Technique/Positioning: Frontal view of the chest.    Findings:    Support devices: None.    Cardiac/mediastinum/hilum: Cardiomegaly.    Lung parenchyma/Pleura: Upper lobe predominant fibrotic changes. No   pneumothorax.    Skeleton/soft tissues: Osteopenia. Degenerative changes in the spine    Impression:    Upper lobe predominant fibrotic changes. No definite acute consolidation.   Cardiomegaly.    --- End of Report ---    SERGIO SARABIA MD; Attending Radiologist  This documenthas been electronically signed. 2023  9:29AM (23 @ 21:05)      CT  CT Abdomen and Pelvis No Cont:   ACC: [id number] EXAM:  CT ABDOMEN AND PELVIS   ORDERED BY: MICHAEL BERGER     ACC: [id number] EXAM:  CT CHEST   ORDERED BY: MICHAEL BERGER     PROCEDURE DATE:  2023      INTERPRETATION:  STUDY INDICATION: Trauma Code    TECHNIQUE: CT angiogram of the chest, abdomen and pelvis was performed   without intravenous contrast.  Oral contrast was not administered.    Reformatted images in the coronal and sagittal planes were acquired.    COMPARISON CT: None.    QUALITY STATEMENT: Patientupper extremities causing streak artifact   inherently degrades image quality and limits this exam. Note that the   evaluation of solid organs and bowel loops is limited secondary to lack   of oral and IV contrast.    FINDINGS:    CHEST    MEDIASTINUM/LYMPH NODES: No lymphadenopathy by size criteria..    HEART/GREAT VESSELS: Multichamber cardiomegaly. Trace pericardial   effusion. Multivessel coronary arterial calcifications. Aortic valve   calcifications. Normal caliber aorta.    LUNGS, PLEURA, AND AIRWAYS: Central airways are patent. Upper lung   predominant nonspecific mild lateral fibrotic changes with traction   bronchiectasis, septal thickening and reticulations. No mass or   consolidation. No pneumothorax or pleural effusion.    ABDOMEN/PELVIS    HEPATOBILIARY: Unremarkable.    SPLEEN: Unremarkable.    PANCREAS: Unremarkable.    ADRENAL GLANDS: Unremarkable.    KIDNEYS: Atrophic right kidney with lower pole calculus. No   hydronephrosis bilaterally. Nonobstructing left nephrolithiasis. Left   upper pole cyst.    ABDOMINOPELVIC NODES: Unremarkable.    PELVIC ORGANS: Unremarkable.    PERITONEUM/MESENTERY/BOWEL: No bowel obstruction, pneumoperitoneum or   pneumatosis. No ascites.  Colonic diverticulosis. Normal appendix    BONES/SOFT TISSUES:  Comminuted fracture of the left superior pubic ramus with up to   approximately 1 to 1.5 cm medial displacement of the ramus fracture   fragment. Fracture line extends to the pubic tubercle. The pubic   symphysis articulation appears intact.    Nondisplaced mid inferior pubic ramus comminuted fracture.    Surrounding small hemorrhage in the left pelvis.    Inferior plate compression fracture of L3. There is   prevertebral/periaortic fat stranding in the same region incidentally in   close proximity to the intrarenal aortic aneurysm (series 6 image 319)    OTHER/VASCULAR: Diffuse atherosclerotic disease. Infrarenal aortic   aneurysm measuring up to 3.3 cm. Tortuous aorta.      IMPRESSION:    1. Comminuted fracture of the left superior pubic ramus with up to   approximately 1 to 1.5 cm medial displacement of the ramus fracture   fragment. Fracture line extends to the pubic tubercle. Pubic symphysis   articulation appears intact. Associated small left pelvic hemorrhage.    2. Nondisplaced mid left inferior pubic ramus comminuted fracture.    3. Inferior plate compression fracture of L3. There is   prevertebral/periaortic fat stranding in the same region incidentally in   close proximity to the intrarenal aortic aneurysm (series 6 image 319).   Presumably this stranding associated with the fracture suggesting acuity.   Correlate for point tenderness. Please note vascular injury cannot be   excluded on this exam due to lack of intravenous contrast.    4. Nonspecific bilateral lung fibrotic changes with slight upper lung   predominant.    Spoke with MICHAEL BERGER MD; Resident Rad on 2023 12:17 AM with   readback.    --- End of Report ---      ODALYS CROFT MD; Attending Radiologist  This document has been electronically signed. 2023 12:24AM (23 @ 22:15)  CT Chest No Cont:   ACC: [id number] EXAM:  CT ABDOMEN AND PELVIS   ORDERED BY: MICHAEL BERGER     ACC: [id number] EXAM:  CT CHEST   ORDERED BY: MICHAEL BERGER     PROCEDURE DATE:  2023      INTERPRETATION:  STUDY INDICATION: Trauma Code    TECHNIQUE: CT angiogram of the chest, abdomen and pelvis was performed   without intravenous contrast.  Oral contrast was not administered.    Reformatted images in the coronal and sagittal planes were acquired.    COMPARISON CT: None.    QUALITY STATEMENT: Patientupper extremities causing streak artifact   inherently degrades image quality and limits this exam. Note that the   evaluation of solid organs and bowel loops is limited secondary to lack   of oral and IV contrast.    FINDINGS:    CHEST    MEDIASTINUM/LYMPH NODES: No lymphadenopathy by size criteria..    HEART/GREAT VESSELS: Multichamber cardiomegaly. Trace pericardial   effusion. Multivessel coronary arterial calcifications. Aortic valve   calcifications. Normal caliber aorta.    LUNGS, PLEURA, AND AIRWAYS: Central airways are patent. Upper lung   predominant nonspecific mild lateral fibrotic changes with traction   bronchiectasis, septal thickening and reticulations. No mass or   consolidation. No pneumothorax or pleural effusion.    ABDOMEN/PELVIS    HEPATOBILIARY: Unremarkable.    SPLEEN: Unremarkable.    PANCREAS: Unremarkable.    ADRENAL GLANDS: Unremarkable.    KIDNEYS: Atrophic right kidney with lower pole calculus. No   hydronephrosis bilaterally. Nonobstructing left nephrolithiasis. Left   upper pole cyst.    ABDOMINOPELVIC NODES: Unremarkable.    PELVIC ORGANS: Unremarkable.    PERITONEUM/MESENTERY/BOWEL: No bowel obstruction, pneumoperitoneum or   pneumatosis. No ascites.  Colonic diverticulosis. Normal appendix    BONES/SOFT TISSUES:  Comminuted fracture of the left superior pubic ramus with up to   approximately 1 to 1.5 cm medial displacement of the ramus fracture   fragment. Fracture line extends to the pubic tubercle. The pubic   symphysis articulation appears intact.    Nondisplaced mid inferior pubic ramus comminuted fracture.    Surrounding small hemorrhage in the left pelvis.    Inferior plate compression fracture of L3. There is   prevertebral/periaortic fat stranding in the same region incidentally in   close proximity to the intrarenal aortic aneurysm (series 6 image 319)    OTHER/VASCULAR: Diffuse atherosclerotic disease. Infrarenal aortic   aneurysm measuring up to 3.3 cm. Tortuous aorta.      IMPRESSION:    1. Comminuted fracture of the left superior pubic ramus with up to   approximately 1 to 1.5 cm medial displacement of the ramus fracture   fragment. Fracture line extends to the pubic tubercle. Pubic symphysis   articulation appears intact. Associated small left pelvic hemorrhage.    2. Nondisplaced mid left inferior pubic ramus comminuted fracture.    3. Inferior plate compression fracture of L3. There is   prevertebral/periaortic fat stranding in the same region incidentally in   close proximity to the intrarenal aortic aneurysm (series 6 image 319).   Presumably this stranding associated with the fracture suggesting acuity.   Correlate for point tenderness. Please note vascular injury cannot be   excluded on this exam due to lack of intravenous contrast.    4. Nonspecific bilateral lung fibrotic changes with slight upper lung   predominant.    Spoke with MICHAEL BERGER MD; Resident Rad on 2023 12:17 AM with   readback.    --- End of Report ---    ODALYS CROFT MD; Attending Radiologist  This document has been electronically signed. 2023 12:24AM (23 @ 22:10)      CARDIOLOGY TESTING  12 Lead ECG:   Ventricular Rate 62 BPM    QRS Duration 126 ms    Q-T Interval 462 ms    QTC Calculation(Bazett) 468 ms    R Axis 148 degrees    T Axis 15 degrees    Diagnosis Line *** Suspect arm lead reversal, interpretation assumes no reversal  Atrial fibrillation  Non-specific intra-ventricular conduction block  Abnormal ECG    Confirmed by Teressa Patel MD ([id number]) on 2023 9:40:56 AM (23 @ 19:35)      MEDICATIONS  (STANDING):  acetaminophen     Tablet .. 650 milliGRAM(s) Oral every 6 hours  carvedilol 6.25 milliGRAM(s) Oral every 12 hours  dextrose 50% Injectable 25 Gram(s) IV Push once  dextrose 50% Injectable 12.5 Gram(s) IV Push once  dextrose 50% Injectable 25 Gram(s) IV Push once  donepezil 10 milliGRAM(s) Oral at bedtime  insulin lispro (ADMELOG) corrective regimen sliding scale   SubCutaneous three times a day before meals  lactated ringers. 1000 milliLiter(s) (50 mL/Hr) IV Continuous <Continuous>  levothyroxine 25 MICROGram(s) Oral daily  memantine 5 milliGRAM(s) Oral daily  norepinephrine Infusion 0.05 MICROgram(s)/kG/Min (6.09 mL/Hr) IV Continuous <Continuous>    ANTIBIOTICS:    All available historical data has been reviewed    ASSESSMENT  89y F w/ Extensive PMH as above admitted with Other specified fracture of unspecified pubis, initial encounter for closed fracture  Currently hypotensive suspecting Urosepsis w/ Septic Shock and Lactic Acidosis and Metabolic Encephalopathy

## 2023-02-06 NOTE — CONSULT NOTE ADULT - ASSESSMENT
Assessment & Plan    89 yo F PMH HTN, DM, Afib on eliquis, dementia, CAD s/p PCI x3 2003, HTN, DLD, COPD on 2L NC, glaucoma/cataract, bladder cancer s/p laser 2016,  non hodgkin's lymphoma in remission last chemotherapy April 2018, vascular dementia, stage 3 CKD (baseline Cr 1.6), seen as  Trauma Alert 02/05 night s/p unwitnessed fall, ?ht, -loc, +eliquis, found to have left superior pubic rami fracture with 1.5cm displacement and endplate L3 fracture on trauma work up and admitted to the trauma service for rehab purposes. SICU consulted for evaluation of hypotension.     NEURO:  #Acute pain  Controlled with tylenol ATC, gabapentin 100 TID  #hx vascular dementia   donepezil 10 milliGRAM(s) Oral at bedtime  memantine 5 milliGRAM(s) Oral daily    RESP:   #hx COPD  -on 2L NC   -wean off NC to RA as tolerated  -daily CXR  -encourage IS   #Activity    -increase as tolerated      CARDS:   #hx Afib  -carvedilol 6.25 PO every 12 hours  -home meds: eliquis 2.5 BID HOLDING   #hx HTN  -home meds: candesartan 4mg PO, isosorbide mononitrate 30mg PO ER HOLDING  #hx CAD s/p PCI    GI/NUTR:   #Diet   -currently NPO except meds with sips & chips  -advance based on bedside dysphagia evaluation  -aspiration precautions, HOB 30  #GI Prophylaxis    -not indicated  #Bowel regimen    -not indicated    /RENAL:   #urine output in critically ill    -indwelling black (placed in ED)    Labs:          BUN/Cr- 40/1.5  -->,  40/1.6  -->          Electrolytes-Na 134 // K 4.3 // Mg 2.0 //  Phos 4.4 (02-06 @ 10:30)         HEME/ONC:     ##DVT prophylaxis    -, SCDs    Labs: Hb/Hct:  12.2/38.8  -->,  10.4/32.6  -->                      Plts:  265  -->,  180  -->                 PTT/INR:  29.8/1.35  --->       Home Rx: Eliquis 2.5 mg oral tablet: 1 tab(s) orally 2 times a day    T&S Expires:   Blood Consent-    ID:  #leukocytosis   WBC- 16.73  --->>,  9.35  --->>  Antibiotics-  Culture-  Temp trend- 24hrs T(F): 97.3 (02-06 @ 08:25), Max: 98 (02-05 @ 19:40)  Current antibiotics-        ENDO:    Glucose, Serum: 128 (02-06 @ 10:30)       HA1C     MSK:    LINES/DRAINS:  PIV, Black, [place], TLC    ADVANCED DIRECTIVES:  Full Code    HCP/Emergency Contact-    INDICATION FOR SICU/SDU: Other specified fracture of unspecified pubis, initial encounter for closed fracture             DISPO:   Case discussed with attending   Assessment & Plan    89 yo F PMH HTN, DM, Afib on eliquis, dementia, CAD s/p PCI x3 , HTN, DLD, COPD on 2L NC, glaucoma/cataract, bladder cancer s/p laser ,  non hodgkin's lymphoma in remission last chemotherapy 2018, vascular dementia, stage 3 CKD (baseline Cr 1.6), seen as  Trauma Alert  night s/p unwitnessed fall, ?ht, -loc, +eliquis, found to have left superior pubic rami fracture with 1.5cm displacement and endplate L3 fracture on trauma work up and admitted to the trauma service for rehab purposes. SICU consulted for evaluation of hypotension.     NEURO:  #Acute pain  Controlled with tylenol ATC, gabapentin 100 TID  #hx vascular dementia   donepezil 10 milliGRAM(s) Oral at bedtime  memantine 5 milliGRAM(s) Oral daily    RESP:   #hx COPD  -on 2L NC   -wean off NC to RA as tolerated  -daily CXR  -encourage IS   #Activity    -increase as tolerated      CARDS:   #hx Afib  -carvedilol 6.25 PO every 12 hours  -home meds: eliquis 2.5 BID HOLDING   #hx HTN  -home meds: candesartan 4mg PO, isosorbide mononitrate 30mg PO ER HOLDING  #hx CAD s/p PCI    GI/NUTR:   #Diet   -currently NPO except meds with sips & chips  -advance based on bedside dysphagia evaluation  -aspiration precautions, HOB 30  #GI Prophylaxis    -not indicated  #Bowel regimen    -not indicated    /RENAL:   #urine output in critically ill    -indwelling black (placed in ED)  -Q1 I&O  #maintenance fluid  LR @50ml/hr   #hx CKD  -baseline Cr 1.6   #hx bladder cancer s/p laser     Labs:          BUN/Cr- 40/1.5  -->,  40/1.6  -->          Electrolytes-Na 134 // K 4.3 // Mg 2.0 //  Phos 4.4 ( @ 10:30)         HEME/ONC:   #DVT prophylaxis-HOLDING, SCDs    Labs: Hb/Hct:  12.2/38.8  -->,  10.4/32.6  -->                      Plts:  265  -->,  180  -->                 PTT/INR:  29.8/1.35  --->       Home Rx: Eliquis 2.5 mg oral tablet: 1 tab(s) orally 2 times a day  #non hodgkin's lymphoma in remission, last chemotherapy 2018  T&S sent     ID:  #leukocytosis   WBC- 16.73  --->>,  9.35  --->>  Abx: Ceftriaxone 1g daily   Temp trend- 24hrs T(F): 97.3 ( @ 08:25), Max: 98 ( @ 19:40)  #UTI  Urinalysis Basic - ( 2023 09:39 )  Color: Yellow / Appearance: Slightly Turbid / S.027 / pH: x  Gluc: x / Ketone: Negative  / Bili: Negative / Urobili: <2 mg/dL   Blood: x / Protein: 30 mg/dL / Nitrite: Negative   Leuk Esterase: Large / RBC: 24 /HPF /  /HPF   Sq Epi: x / Non Sq Epi: 1 /HPF / Bacteria: Many  -placed on Rocephin  -f/u Ucx           ENDO:    #hx DM  -on ISS  Glucose, Serum: 128 ( @ 10:30)  #hx hypothyroidism   -synthroid 25mcg PO daily     MSK:  #left superior pubic rami fracture with 1.5cm displacement and endplate L3 fracture   -per ortho no surgical intervention, LLE WBAT   LINES/DRAINS:  Tong JONH    ADVANCED DIRECTIVES:  Full Code    HCP/Emergency Contact-Sarah Chaney (daughter) [phone]    INDICATION FOR SICU/SDU: Other specified fracture of unspecified pubis, initial encounter for closed fracture      DISPO:   SICU, case d/w Dr. Bolden Assessment & Plan    89 yo F PMH HTN, DM, Afib on eliquis, dementia, CAD s/p PCI x3 , HTN, DLD, COPD on 2L NC, glaucoma/cataract, bladder cancer s/p laser ,  non hodgkin's lymphoma in remission last chemotherapy 2018, vascular dementia, stage 3 CKD (baseline Cr 1.6), seen as  Trauma Alert  night s/p unwitnessed fall, ?ht, -loc, +eliquis, found to have left superior pubic rami fracture with 1.5cm displacement and endplate L3 fracture on trauma work up and admitted to the trauma service for rehab purposes. SICU consulted for evaluation of hypotension.     NEURO:  #Acute pain  Controlled with tylenol ATC, gabapentin 100 TID  #hx vascular dementia   donepezil 10 milliGRAM(s) Oral at bedtime  memantine 5 milliGRAM(s) Oral daily    RESP:   #hx COPD  -on 2L NC   -wean off NC to RA as tolerated  -daily CXR  -encourage IS   #Activity    -increase as tolerated      CARDS:   #hypotension  -SBP 70s-80s 2/6 AM, received 1L bolus, BP improved to 96/52  -Q1 vitals, continue to monitor   #hx Afib  -carvedilol 6.25 PO every 12 hours  -home meds: eliquis 2.5 BID HOLDING   #hx HTN  -home meds: candesartan 4mg PO, isosorbide mononitrate 30mg PO ER HOLDING  #hx CAD s/p PCI    GI/NUTR:   #Diet   -currently NPO except meds with sips & chips  -advance based on bedside dysphagia evaluation  -aspiration precautions, HOB 30  #GI Prophylaxis    -not indicated  #Bowel regimen    -not indicated    /RENAL:   #urine output in critically ill    -indwelling black (placed in ED)  -Q1 I&O  #maintenance fluid  LR @50ml/hr   #hx CKD  -baseline Cr 1.6   #hx bladder cancer s/p laser     Labs:          BUN/Cr- 40/1.5  -->,  40/1.6  -->          Electrolytes-Na 134 // K 4.3 // Mg 2.0 //  Phos 4.4 ( @ 10:30)         HEME/ONC:   #DVT prophylaxis-HOLDING, SCDs    Labs: Hb/Hct:  12.2/38.8  -->,  10.4/32.6  -->                      Plts:  265  -->,  180  -->                 PTT/INR:  29.8/1.35  --->       Home Rx: Eliquis 2.5 mg oral tablet: 1 tab(s) orally 2 times a day  #non hodgkin's lymphoma in remission, last chemotherapy 2018  T&S sent     ID:  #leukocytosis   WBC- 16.73  --->>,  9.35  --->>  Abx: Ceftriaxone 1g daily   Temp trend- 24hrs T(F): 97.3 ( @ 08:25), Max: 98 ( @ 19:40)  #UTI  Urinalysis Basic - ( 2023 09:39 )  Color: Yellow / Appearance: Slightly Turbid / S.027 / pH: x  Gluc: x / Ketone: Negative  / Bili: Negative / Urobili: <2 mg/dL   Blood: x / Protein: 30 mg/dL / Nitrite: Negative   Leuk Esterase: Large / RBC: 24 /HPF /  /HPF   Sq Epi: x / Non Sq Epi: 1 /HPF / Bacteria: Many  -placed on Rocephin  -f/u Ucx           ENDO:    #hx DM  -on ISS  Glucose, Serum: 128 ( @ 10:30)  #hx hypothyroidism   -synthroid 25mcg PO daily     MSK:  #left superior pubic rami fracture with 1.5cm displacement and endplate L3 fracture   -per ortho no surgical intervention, LLE WBAT   LINES/DRAINS:  PIV, Black    ADVANCED DIRECTIVES:  Full Code    HCP/Emergency Contact-Sarah Chaney (daughter) [phone]    INDICATION FOR SICU/SDU: Other specified fracture of unspecified pubis, initial encounter for closed fracture      DISPO:   SICU, case d/w Dr. Bolden Assessment & Plan    89 yo F PMH HTN, DM, Afib on eliquis, dementia, CAD s/p PCI x3 , HTN, DLD, COPD on 2L NC, glaucoma/cataract, bladder cancer s/p laser ,  non hodgkin's lymphoma in remission last chemotherapy 2018, vascular dementia, stage 3 CKD (baseline Cr 1.6), seen as  Trauma Alert  night s/p unwitnessed fall, ?ht, -loc, +eliquis, found to have left superior pubic rami fracture with 1.5cm displacement and endplate L3 fracture on trauma work up and admitted to the trauma service for rehab purposes. SICU consulted for evaluation of hypotension.     NEURO:  #Acute pain  Controlled with tylenol ATC, gabapentin 100 TID  #endplate L3 fracture   -Neurosurgery consulted  #hx vascular dementia   donepezil 10 milliGRAM(s) Oral at bedtime  memantine 5 milliGRAM(s) Oral daily    RESP:   #hx COPD  -on 2L NC   -wean off NC to RA as tolerated  -daily CXR  -encourage IS   #Activity    -increase as tolerated      CARDS:   #hypotension  -SBP 70s-80s 2/6 AM, received 1L bolus, BP improved to 96/52  -Q1 vitals, continue to monitor   #hx Afib  -carvedilol 6.25 PO every 12 hours  -home meds: eliquis 2.5 BID HOLDING   #hx HTN  -home meds: candesartan 4mg PO, isosorbide mononitrate 30mg PO ER HOLDING  #hx CAD s/p PCI    GI/NUTR:   #Diet   -currently NPO except meds with sips & chips  -advance based on bedside dysphagia evaluation  -aspiration precautions, HOB 30  #GI Prophylaxis    -not indicated  #Bowel regimen    -not indicated    /RENAL:   #urine output in critically ill    -indwelling black (placed in ED)  -Q1 I&O  #maintenance fluid  LR @50ml/hr   #hx CKD  -baseline Cr 1.6   #hx bladder cancer s/p laser     Labs:          BUN/Cr- 40/1.5  -->,  40/1.6  -->          Electrolytes-Na 134 // K 4.3 // Mg 2.0 //  Phos 4.4 ( @ 10:30)         HEME/ONC:   #DVT prophylaxis-HOLDING, SCDs    Labs: Hb/Hct:  12.2/38.8  -->,  10.4/32.6  -->                      Plts:  265  -->,  180  -->                 PTT/INR:  29.8/1.35  --->       Home Rx: Eliquis 2.5 mg oral tablet: 1 tab(s) orally 2 times a day  #non hodgkin's lymphoma in remission, last chemotherapy 2018  T&S sent     ID:  #leukocytosis   WBC- 16.73  --->>,  9.35  --->>  Abx: Ceftriaxone 1g daily   Temp trend- 24hrs T(F): 97.3 ( @ 08:25), Max: 98 ( @ 19:40)  #UTI  Urinalysis Basic - ( 2023 09:39 )  Color: Yellow / Appearance: Slightly Turbid / S.027 / pH: x  Gluc: x / Ketone: Negative  / Bili: Negative / Urobili: <2 mg/dL   Blood: x / Protein: 30 mg/dL / Nitrite: Negative   Leuk Esterase: Large / RBC: 24 /HPF /  /HPF   Sq Epi: x / Non Sq Epi: 1 /HPF / Bacteria: Many  -placed on Rocephin  -f/u Ucx           ENDO:    #hx DM  -on ISS  Glucose, Serum: 128 ( @ 10:30)  #hx hypothyroidism   -synthroid 25mcg PO daily     MSK:  #left superior pubic rami fracture with 1.5cm displacement   -per ortho no surgical intervention, LLE WBAT   LINES/DRAINS:  PIV, Black    ADVANCED DIRECTIVES:  DNR/DNI    HCP/Emergency Contact-Sarah Chaney (daughter) [phone]    INDICATION FOR SICU/SDU: Other specified fracture of unspecified pubis, initial encounter for closed fracture      DISPO:   SICU, case d/w Dr. Bolden Assessment & Plan    89 yo F PMH HTN, DM, Afib on eliquis, dementia, CAD s/p PCI x3 , HTN, DLD, COPD on 2L NC, glaucoma/cataract, bladder cancer s/p laser ,  non hodgkin's lymphoma in remission last chemotherapy 2018, vascular dementia, stage 3 CKD (baseline Cr 1.6), seen as  Trauma Alert  night s/p unwitnessed fall, ?ht, -loc, +eliquis, found to have left superior pubic rami fracture with 1.5cm displacement and endplate L3 fracture on trauma work up and admitted to the trauma service for rehab purposes. SICU consulted for evaluation of hypotension.     NEURO:  #Acute pain  Controlled with tylenol ATC, gabapentin 100 TID  #endplate L3 fracture   -Neurosurgery consulted  #hx vascular dementia   donepezil 10 milliGRAM(s) Oral at bedtime  memantine 5 milliGRAM(s) Oral daily    RESP:   #hx COPD  -on 2L NC   -wean off NC to RA as tolerated  -daily CXR  -encourage IS   #Activity    -increase as tolerated      CARDS:   #acute hypotension- cardiogenic shock vs. septic shock  -SBP 70s-80s 2/6 AM, received 1L bolus, BP improved to 96/52  -Q1 vitals, continue to monitor   #hx Afib  -carvedilol 6.25 PO every 12 hours  -home meds: eliquis 2.5 BID HOLDING   #hx HTN  -home meds: candesartan 4mg PO, isosorbide mononitrate 30mg PO ER HOLDING  #hx CAD s/p PCI    GI/NUTR:   #Diet   -currently NPO except meds with sips & chips  -advance based on bedside dysphagia evaluation  -aspiration precautions, HOB 30  #GI Prophylaxis    -not indicated  #Bowel regimen    -not indicated    /RENAL:   #urine output in critically ill    -indwelling black (placed in ED)  -Q1 I&O  #maintenance fluid  LR @50ml/hr   #hx CKD  -baseline Cr 1.6   #hx bladder cancer s/p laser     Labs:          BUN/Cr- 40/1.5  -->,  40/1.6  -->          Electrolytes-Na 134 // K 4.3 // Mg 2.0 //  Phos 4.4 ( @ 10:30)         HEME/ONC:   #DVT prophylaxis-HOLDING, SCDs    Labs: Hb/Hct:  12.2/38.8  -->,  10.4/32.6  -->                      Plts:  265  -->,  180  -->                 PTT/INR:  29.8/1.35  --->       Home Rx: Eliquis 2.5 mg oral tablet: 1 tab(s) orally 2 times a day  #non hodgkin's lymphoma in remission, last chemotherapy 2018  T&S sent     ID:  #leukocytosis   WBC- 16.73  --->>,  9.35  --->>  Abx: Ceftriaxone 1g daily   Temp trend- 24hrs T(F): 97.3 ( @ 08:25), Max: 98 ( @ 19:40)  #UTI  Urinalysis Basic - ( 2023 09:39 )  Color: Yellow / Appearance: Slightly Turbid / S.027 / pH: x  Gluc: x / Ketone: Negative  / Bili: Negative / Urobili: <2 mg/dL   Blood: x / Protein: 30 mg/dL / Nitrite: Negative   Leuk Esterase: Large / RBC: 24 /HPF /  /HPF   Sq Epi: x / Non Sq Epi: 1 /HPF / Bacteria: Many  -placed on Rocephin  -f/u Ucx           ENDO:    #hx DM  -on ISS  Glucose, Serum: 128 ( @ 10:30)  #hx hypothyroidism   -synthroid 25mcg PO daily     MSK:  #left superior pubic rami fracture with 1.5cm displacement   -per ortho no surgical intervention, LLE WBAT   LINES/DRAINS:  Tong JOHN    ADVANCED DIRECTIVES:  DNR/DNI    HCP/Emergency Contact-Sarah Chaney (daughter) [phone]    INDICATION FOR SICU/SDU: Other specified fracture of unspecified pubis, initial encounter for closed fracture      DISPO:   SICU, case d/w Dr. Bolden

## 2023-02-06 NOTE — H&P ADULT - ASSESSMENT
ASSESSMENT:  89y Female  w/ PMHx of dementia, hld, htn seen as  Trauma Alert s/p unwitnessed fall, ?ht, -loc. +eliquis with no complaints, no external signs of trauma . Trauma assessment in ED: ABCs intact , GCS 15 , AAOx3,  ELIZONDO.     Injuries identified:   - LEFT SUPERIOR PUBIC RAMI FX (1.5CM DISPLACEMENT)  -ENDPLATE L3 FRACTURE    PLAN:   - Ortho- WBAT LLE  -Neurosurgery consult  -Pain control  -Home medications  -Q4 vitals  -Q4 ins and outs  -PT/Rehab      Disposition pending results of above labs and imaging  Above plan discussed with Trauma attending, Dr. Bruce , patient, patient family, and ED team  --------------------------------------------------------------------------------------  02-05-23 @ 20:03

## 2023-02-07 DIAGNOSIS — S37.22XA CONTUSION OF BLADDER, INITIAL ENCOUNTER: ICD-10-CM

## 2023-02-07 LAB
A1C WITH ESTIMATED AVERAGE GLUCOSE RESULT: 7.1 % — HIGH (ref 4–5.6)
BASOPHILS # BLD AUTO: 0.1 K/UL — SIGNIFICANT CHANGE UP (ref 0–0.2)
BASOPHILS NFR BLD AUTO: 1.2 % — HIGH (ref 0–1)
EOSINOPHIL # BLD AUTO: 0.42 K/UL — SIGNIFICANT CHANGE UP (ref 0–0.7)
EOSINOPHIL NFR BLD AUTO: 4.9 % — SIGNIFICANT CHANGE UP (ref 0–8)
ESTIMATED AVERAGE GLUCOSE: 157 MG/DL — HIGH (ref 68–114)
GLUCOSE BLDC GLUCOMTR-MCNC: 119 MG/DL — HIGH (ref 70–99)
GLUCOSE BLDC GLUCOMTR-MCNC: 124 MG/DL — HIGH (ref 70–99)
GLUCOSE BLDC GLUCOMTR-MCNC: 167 MG/DL — HIGH (ref 70–99)
GLUCOSE BLDC GLUCOMTR-MCNC: 74 MG/DL — SIGNIFICANT CHANGE UP (ref 70–99)
HCT VFR BLD CALC: 34.7 % — LOW (ref 37–47)
HGB BLD-MCNC: 11.3 G/DL — LOW (ref 12–16)
IMM GRANULOCYTES NFR BLD AUTO: 0.4 % — HIGH (ref 0.1–0.3)
LYMPHOCYTES # BLD AUTO: 2.12 K/UL — SIGNIFICANT CHANGE UP (ref 1.2–3.4)
LYMPHOCYTES # BLD AUTO: 24.8 % — SIGNIFICANT CHANGE UP (ref 20.5–51.1)
MCHC RBC-ENTMCNC: 26.5 PG — LOW (ref 27–31)
MCHC RBC-ENTMCNC: 32.6 G/DL — SIGNIFICANT CHANGE UP (ref 32–37)
MCV RBC AUTO: 81.3 FL — SIGNIFICANT CHANGE UP (ref 81–99)
MONOCYTES # BLD AUTO: 1.31 K/UL — HIGH (ref 0.1–0.6)
MONOCYTES NFR BLD AUTO: 15.3 % — HIGH (ref 1.7–9.3)
NEUTROPHILS # BLD AUTO: 4.57 K/UL — SIGNIFICANT CHANGE UP (ref 1.4–6.5)
NEUTROPHILS NFR BLD AUTO: 53.4 % — SIGNIFICANT CHANGE UP (ref 42.2–75.2)
NRBC # BLD: 0 /100 WBCS — SIGNIFICANT CHANGE UP (ref 0–0)
PLATELET # BLD AUTO: 184 K/UL — SIGNIFICANT CHANGE UP (ref 130–400)
RBC # BLD: 4.27 M/UL — SIGNIFICANT CHANGE UP (ref 4.2–5.4)
RBC # FLD: 19.9 % — HIGH (ref 11.5–14.5)
WBC # BLD: 8.55 K/UL — SIGNIFICANT CHANGE UP (ref 4.8–10.8)
WBC # FLD AUTO: 8.55 K/UL — SIGNIFICANT CHANGE UP (ref 4.8–10.8)

## 2023-02-07 PROCEDURE — 99291 CRITICAL CARE FIRST HOUR: CPT

## 2023-02-07 PROCEDURE — 93010 ELECTROCARDIOGRAM REPORT: CPT | Mod: 76

## 2023-02-07 PROCEDURE — 71045 X-RAY EXAM CHEST 1 VIEW: CPT | Mod: 26

## 2023-02-07 PROCEDURE — 74176 CT ABD & PELVIS W/O CONTRAST: CPT | Mod: 26

## 2023-02-07 RX ORDER — SODIUM CHLORIDE 9 MG/ML
1000 INJECTION, SOLUTION INTRAVENOUS
Refills: 0 | Status: DISCONTINUED | OUTPATIENT
Start: 2023-02-07 | End: 2023-02-10

## 2023-02-07 RX ORDER — CHLORHEXIDINE GLUCONATE 213 G/1000ML
1 SOLUTION TOPICAL
Refills: 0 | Status: DISCONTINUED | OUTPATIENT
Start: 2023-02-07 | End: 2023-02-14

## 2023-02-07 RX ORDER — HEPARIN SODIUM 5000 [USP'U]/ML
5000 INJECTION INTRAVENOUS; SUBCUTANEOUS EVERY 8 HOURS
Refills: 0 | Status: DISCONTINUED | OUTPATIENT
Start: 2023-02-07 | End: 2023-02-09

## 2023-02-07 RX ORDER — MIDODRINE HYDROCHLORIDE 2.5 MG/1
5 TABLET ORAL EVERY 8 HOURS
Refills: 0 | Status: DISCONTINUED | OUTPATIENT
Start: 2023-02-07 | End: 2023-02-08

## 2023-02-07 RX ORDER — MAGNESIUM SULFATE 500 MG/ML
2 VIAL (ML) INJECTION ONCE
Refills: 0 | Status: DISCONTINUED | OUTPATIENT
Start: 2023-02-07 | End: 2023-02-07

## 2023-02-07 RX ADMIN — Medication 650 MILLIGRAM(S): at 00:44

## 2023-02-07 RX ADMIN — SODIUM CHLORIDE 50 MILLILITER(S): 9 INJECTION, SOLUTION INTRAVENOUS at 21:27

## 2023-02-07 RX ADMIN — Medication 650 MILLIGRAM(S): at 17:17

## 2023-02-07 RX ADMIN — Medication 650 MILLIGRAM(S): at 23:19

## 2023-02-07 RX ADMIN — CEFEPIME 100 MILLIGRAM(S): 1 INJECTION, POWDER, FOR SOLUTION INTRAMUSCULAR; INTRAVENOUS at 15:17

## 2023-02-07 RX ADMIN — Medication 2: at 18:31

## 2023-02-07 RX ADMIN — Medication 650 MILLIGRAM(S): at 05:59

## 2023-02-07 RX ADMIN — HEPARIN SODIUM 5000 UNIT(S): 5000 INJECTION INTRAVENOUS; SUBCUTANEOUS at 15:17

## 2023-02-07 RX ADMIN — HEPARIN SODIUM 5000 UNIT(S): 5000 INJECTION INTRAVENOUS; SUBCUTANEOUS at 23:19

## 2023-02-07 RX ADMIN — Medication 650 MILLIGRAM(S): at 00:14

## 2023-02-07 RX ADMIN — MIDODRINE HYDROCHLORIDE 5 MILLIGRAM(S): 2.5 TABLET ORAL at 23:19

## 2023-02-07 RX ADMIN — Medication 25 MICROGRAM(S): at 05:18

## 2023-02-07 RX ADMIN — Medication 6.09 MICROGRAM(S)/KG/MIN: at 21:27

## 2023-02-07 RX ADMIN — CEFEPIME 100 MILLIGRAM(S): 1 INJECTION, POWDER, FOR SOLUTION INTRAMUSCULAR; INTRAVENOUS at 05:18

## 2023-02-07 RX ADMIN — MEMANTINE HYDROCHLORIDE 5 MILLIGRAM(S): 10 TABLET ORAL at 11:31

## 2023-02-07 RX ADMIN — CEFEPIME 100 MILLIGRAM(S): 1 INJECTION, POWDER, FOR SOLUTION INTRAMUSCULAR; INTRAVENOUS at 21:31

## 2023-02-07 RX ADMIN — HEPARIN SODIUM 5000 UNIT(S): 5000 INJECTION INTRAVENOUS; SUBCUTANEOUS at 11:30

## 2023-02-07 RX ADMIN — Medication 650 MILLIGRAM(S): at 07:01

## 2023-02-07 RX ADMIN — MIDODRINE HYDROCHLORIDE 5 MILLIGRAM(S): 2.5 TABLET ORAL at 15:16

## 2023-02-07 RX ADMIN — Medication 650 MILLIGRAM(S): at 11:30

## 2023-02-07 RX ADMIN — MIDODRINE HYDROCHLORIDE 5 MILLIGRAM(S): 2.5 TABLET ORAL at 11:30

## 2023-02-07 RX ADMIN — DONEPEZIL HYDROCHLORIDE 10 MILLIGRAM(S): 10 TABLET, FILM COATED ORAL at 21:27

## 2023-02-07 RX ADMIN — SODIUM CHLORIDE 50 MILLILITER(S): 9 INJECTION, SOLUTION INTRAVENOUS at 05:18

## 2023-02-07 NOTE — PHYSICAL THERAPY INITIAL EVALUATION ADULT - ACTIVE RANGE OF MOTION EXAMINATION, REHAB EVAL
Airway    Date/Time: 5/14/2020 8:02 AM  Performed by: Maxmi Shultz III, M.D.  Authorized by: Maxim Shultz III, M.D.     Location:  OR  Urgency:  Elective  Difficult Airway: No    Indications for Airway Management:  Anesthesia      Spontaneous Ventilation: absent    Sedation Level:  Deep  Preoxygenated: Yes    Patient Position:  Sniffing  Final Airway Type:  Endotracheal airway  Final Endotracheal Airway:  ETT  Cuffed: Yes    Technique Used for Successful ETT Placement:  Direct laryngoscopy    Insertion Site:  Oral  Blade Type:  Tapia  Laryngoscope Blade/Videolaryngoscope Blade Size:  2  ETT Size (mm):  7.5  Measured from:  Lips  ETT to Lips (cm):  21  Placement Verified by: auscultation and capnometry    Cormack-Lehane Classification:  Grade IIb - view of arytenoids or posterior of glottis only  Number of Attempts at Approach:  1          
Peripheral IV    Date/Time: 5/14/2020 8:10 AM  Performed by: Maxim Shultz III, M.D.  Authorized by: Maxim Shultz III, M.D.     Size:  16 G (short)  Laterality:  Right (hand)  Local Anesthetic:  None  Site Prep:  Alcohol  Technique:  Direct puncture  Attempts:  1   Done under GA        
bilateral upper extremity Active ROM was WFL (within functional limits)

## 2023-02-07 NOTE — CONSULT NOTE ADULT - SUBJECTIVE AND OBJECTIVE BOX
89yF w/ PMHx of HTN, DM, Dementia seen as  Trauma Alert s/p unwitnessed fall, ?ht, -loc, +eliquis. The patient was in her rocking chair when a family member heard a thud-the patient is unable to describe the events but a family member was at bedside stating she did not have positive head strike. The patient has no external signs of trauma and denies any pain. The patient was found to have pelvic fractures on trauma work up and admitted to the trauma service for rehab purposes. Orthopedics recommends non-op management.  (2023 04:06)  Blood seen in urinary catheter upon insertion, suspicious for bladder injury considering type of injury and fx.  r/o Bladder perforation      PAST MEDICAL & SURGICAL HISTORY:  HTN (hypertension)      DM (diabetes mellitus)      CAD (coronary artery disease)      Afib      Bladder cancer  in remission      NHL (non-Hodgkin&#x27;s lymphoma)  in remission      HLD (hyperlipidemia)      Glaucoma      Cataract      Hypothyroidism      Congestive heart failure of unknown etiology      Congestive heart failure of unknown etiology      History of total hysterectomy          REVIEW OF SYSTEMS:    CONSTITUTIONAL:  fevers or chills  HEENT: No visual changes  ENDO: No sweating  NECK: No pain or stiffness  MUSCULOSKELETAL: + left pelvic pain  RESPIRATORY: No shortness of breath  CARDIOVASCULAR: No chest pain  GASTROINTESTINAL: No abdominal or epigastric pain. No nausea, vomiting,  No diarrhea or constipation.   NEUROLOGICAL: No mental status changes  PSYCH: No depression, no mood changes  SKIN: No itching      MEDICATIONS  (STANDING):  acetaminophen     Tablet .. 650 milliGRAM(s) Oral every 6 hours  cefepime   IVPB      cefepime   IVPB 1000 milliGRAM(s) IV Intermittent every 8 hours  chlorhexidine 2% Cloths 1 Application(s) Topical <User Schedule>  donepezil 10 milliGRAM(s) Oral at bedtime  heparin   Injectable 5000 Unit(s) SubCutaneous every 8 hours  insulin lispro (ADMELOG) corrective regimen sliding scale   SubCutaneous three times a day before meals  lactated ringers. 1000 milliLiter(s) (50 mL/Hr) IV Continuous <Continuous>  levothyroxine 25 MICROGram(s) Oral daily  memantine 5 milliGRAM(s) Oral daily  midodrine 5 milliGRAM(s) Oral every 8 hours  norepinephrine Infusion 0.05 MICROgram(s)/kG/Min (6.09 mL/Hr) IV Continuous <Continuous>    MEDICATIONS  (PRN):      Allergies    iodine (Rash)  lisinopril (Rash)    Intolerances        SOCIAL HISTORY: No illicit drug use    FAMILY HISTORY:  Family history of ischemic heart disease (IHD) (Mother)    Family history of stroke (Mother)    No pertinent past medical history        Vital Signs Last 24 Hrs  T(C): 36.1 (2023 20:10), Max: 36.1 (2023 00:00)  T(F): 96.9 (2023 20:10), Max: 97 (2023 00:00)  HR: 67 (2023 20:45) (54 - 72)  BP: 105/55 (2023 20:30) (103/58 - 167/77)  BP(mean): 72 (2023 20:30) (72 - 111)  RR: 19 (2023 20:45) (10 - 23)  SpO2: 96% (2023 20:45) (89% - 100%)    Parameters below as of 2023 20:10  Patient On (Oxygen Delivery Method): nasal cannula  O2 Flow (L/min): 2      PHYSICAL EXAM:    Constitutional: NAD, well-developed  HEENT: EOMI  Neck: no pain  Back: No CVA tenderness  Respiratory: No accessory respiratory muscle use  Abd: Soft, NT/ND  no organomegally  no hernia  : 16 F black to gravity drainage, urine is clear yellow.  No hematuria   Extremities: Left hip/pelvic tenderness secondary to fx's  Neurological: A/O x 3  Psychiatric: Normal mood, normal affect  Skin: No rashes    I&O's Summary    2023 07:01  -  2023 07:00  --------------------------------------------------------  IN: 1114.5 mL / OUT: 555 mL / NET: 559.5 mL    2023 07:01  -  2023 22:30  --------------------------------------------------------  IN: 650 mL / OUT: 435 mL / NET: 215 mL        LABS:                        11.3   8.55  )-----------( 184      ( 2023 21:53 )             34.7     02-06    135  |  100  |  40<H>  ----------------------------<  125<H>  4.5   |  23  |  1.5    Ca    9.6      2023 20:22  Phos  4.8     02-06  Mg     2.0     02-06        Urinalysis Basic - ( 2023 09:39 )    Color: Yellow / Appearance: Slightly Turbid / S.027 / pH: x  Gluc: x / Ketone: Negative  / Bili: Negative / Urobili: <2 mg/dL   Blood: x / Protein: 30 mg/dL / Nitrite: Negative   Leuk Esterase: Large / RBC: 24 /HPF /  /HPF   Sq Epi: x / Non Sq Epi: 1 /HPF / Bacteria: Many          RADIOLOGY & ADDITIONAL STUDIES: CT Cystogram performed, no obvious bladder rupture.  Findings more consistent with bladder contusion. Pt is 89yo F s/p fall sustaining multiple Left sided Pelvic fxs  Blood seen in urinary catheter upon insertion, suspicious for bladder injury considering type of injury and fx.  r/o Bladder perforation      PAST MEDICAL & SURGICAL HISTORY:  HTN (hypertension)      DM (diabetes mellitus)      CAD (coronary artery disease) w/ stents      Afib on Eliquis      Bladder cancer  in remission (? Urothelial CA vs Lymphoma) No Pathology submitted      NHL (non-Hodgkin&#x27;s lymphoma)  in remission      HLD (hyperlipidemia)      Glaucoma      Cataract      Hypothyroidism      Congestive heart failure of unknown etiology      Congestive heart failure of unknown etiology      History of total hysterectomy    CKD Stage III          REVIEW OF SYSTEMS:    CONSTITUTIONAL:  fevers or chills  HEENT: No visual changes  ENDO: No sweating  NECK: No pain or stiffness  MUSCULOSKELETAL: + left pelvic pain  RESPIRATORY: No shortness of breath  CARDIOVASCULAR: No chest pain  GASTROINTESTINAL: No abdominal or epigastric pain. No nausea, vomiting,  No diarrhea or constipation.   NEUROLOGICAL: No mental status changes  PSYCH: No depression, no mood changes  SKIN: No itching      MEDICATIONS  (STANDING):  acetaminophen     Tablet .. 650 milliGRAM(s) Oral every 6 hours  cefepime   IVPB      cefepime   IVPB 1000 milliGRAM(s) IV Intermittent every 8 hours  chlorhexidine 2% Cloths 1 Application(s) Topical <User Schedule>  donepezil 10 milliGRAM(s) Oral at bedtime  heparin   Injectable 5000 Unit(s) SubCutaneous every 8 hours  insulin lispro (ADMELOG) corrective regimen sliding scale   SubCutaneous three times a day before meals  lactated ringers. 1000 milliLiter(s) (50 mL/Hr) IV Continuous <Continuous>  levothyroxine 25 MICROGram(s) Oral daily  memantine 5 milliGRAM(s) Oral daily  midodrine 5 milliGRAM(s) Oral every 8 hours  norepinephrine Infusion 0.05 MICROgram(s)/kG/Min (6.09 mL/Hr) IV Continuous <Continuous>        Allergies    iodine (Rash)  lisinopril (Rash)    Intolerances        SOCIAL HISTORY: No illicit drug use    FAMILY HISTORY:  Family history of ischemic heart disease (IHD) (Mother)    Family history of stroke (Mother)    No pertinent past medical history        Vital Signs Last 24 Hrs  T(C): 36.1 (2023 20:10), Max: 36.1 (2023 00:00)  T(F): 96.9 (2023 20:10), Max: 97 (2023 00:00)  HR: 67 (2023 20:45) (54 - 72)  BP: 105/55 (2023 20:30) (103/58 - 167/77)  BP(mean): 72 (2023 20:30) (72 - 111)  RR: 19 (2023 20:45) (10 - 23)  SpO2: 96% (2023 20:45) (89% - 100%)    Parameters below as of 2023 20:10  Patient On (Oxygen Delivery Method): nasal cannula  O2 Flow (L/min): 2      PHYSICAL EXAM:    Constitutional: NAD  HEENT: EOMI  Neck: no pain  Back: No CVA tenderness  Respiratory: No accessory respiratory muscle use  Abd: Soft, NT/ND    : 16 F black to gravity drainage, urine is clear yellow.  No hematuria   Extremities: Left hip/pelvic tenderness secondary to fx's  Neurological: Awake and alert  Psychiatric: Normal mood, normal affect  Skin: No rashes    I&O's Summary    2023 07:01  -  2023 07:00  --------------------------------------------------------  IN: 1114.5 mL / OUT: 555 mL / NET: 559.5 mL    2023 07:01  -  2023 22:30  --------------------------------------------------------  IN: 650 mL / OUT: 435 mL / NET: 215 mL        LABS:                        11.3   8.55  )-----------( 184      ( 2023 21:53 )             34.7     02-06    135  |  100  |  40<H>  ----------------------------<  125<H>  4.5   |  23  |  1.5    Ca    9.6      2023 20:22  Phos  4.8     02-06  Mg     2.0     02-06        Urinalysis Basic - ( 2023 09:39 )    Color: Yellow / Appearance: Slightly Turbid / S.027 / pH: x  Gluc: x / Ketone: Negative  / Bili: Negative / Urobili: <2 mg/dL   Blood: x / Protein: 30 mg/dL / Nitrite: Negative   Leuk Esterase: Large / RBC: 24 /HPF /  /HPF   Sq Epi: x / Non Sq Epi: 1 /HPF / Bacteria: Many          RADIOLOGY & ADDITIONAL STUDIES: CT Cystogram performed, no obvious bladder rupture.  Findings more consistent with bladder contusion. Pt is 89yo F s/p fall sustaining multiple Left sided Pelvic fxs  Blood seen in urinary catheter upon insertion, suspicious for bladder injury considering type of injury and fx.  r/o Bladder perforation, urology consulted      PAST MEDICAL & SURGICAL HISTORY:  HTN (hypertension)      DM (diabetes mellitus)      CAD (coronary artery disease) w/ stents      Afib on Eliquis      Bladder cancer  in remission (? Urothelial CA vs Lymphoma) No Pathology submitted      NHL (non-Hodgkin&#x27;s lymphoma)  in remission      HLD (hyperlipidemia)      Glaucoma      Cataract      Hypothyroidism      Congestive heart failure of unknown etiology      Congestive heart failure of unknown etiology      History of total hysterectomy    CKD Stage III          REVIEW OF SYSTEMS:    CONSTITUTIONAL:  fevers or chills  HEENT: No visual changes  ENDO: No sweating  NECK: No pain or stiffness  MUSCULOSKELETAL: + left pelvic pain  RESPIRATORY: No shortness of breath  CARDIOVASCULAR: No chest pain  GASTROINTESTINAL: No abdominal or epigastric pain. No nausea, vomiting,  No diarrhea or constipation.   NEUROLOGICAL: No mental status changes  PSYCH: No depression, no mood changes  SKIN: No itching      MEDICATIONS  (STANDING):  acetaminophen     Tablet .. 650 milliGRAM(s) Oral every 6 hours  cefepime   IVPB      cefepime   IVPB 1000 milliGRAM(s) IV Intermittent every 8 hours  chlorhexidine 2% Cloths 1 Application(s) Topical <User Schedule>  donepezil 10 milliGRAM(s) Oral at bedtime  heparin   Injectable 5000 Unit(s) SubCutaneous every 8 hours  insulin lispro (ADMELOG) corrective regimen sliding scale   SubCutaneous three times a day before meals  lactated ringers. 1000 milliLiter(s) (50 mL/Hr) IV Continuous <Continuous>  levothyroxine 25 MICROGram(s) Oral daily  memantine 5 milliGRAM(s) Oral daily  midodrine 5 milliGRAM(s) Oral every 8 hours  norepinephrine Infusion 0.05 MICROgram(s)/kG/Min (6.09 mL/Hr) IV Continuous <Continuous>        Allergies    iodine (Rash)  lisinopril (Rash)    Intolerances        SOCIAL HISTORY: No illicit drug use    FAMILY HISTORY:  Family history of ischemic heart disease (IHD) (Mother)    Family history of stroke (Mother)    No pertinent past medical history        Vital Signs Last 24 Hrs  T(C): 36.1 (2023 20:10), Max: 36.1 (2023 00:00)  T(F): 96.9 (2023 20:10), Max: 97 (2023 00:00)  HR: 67 (2023 20:45) (54 - 72)  BP: 105/55 (2023 20:30) (103/58 - 167/77)  BP(mean): 72 (2023 20:30) (72 - 111)  RR: 19 (2023 20:45) (10 - 23)  SpO2: 96% (2023 20:45) (89% - 100%)    Parameters below as of 2023 20:10  Patient On (Oxygen Delivery Method): nasal cannula  O2 Flow (L/min): 2      PHYSICAL EXAM:    Constitutional: NAD  HEENT: EOMI  Neck: no pain  Back: No CVA tenderness  Respiratory: No accessory respiratory muscle use  Abd: Soft, NT/ND    : 16 F black to gravity drainage, urine is clear yellow.  No hematuria   Extremities: Left hip/pelvic tenderness secondary to fx's  Neurological: Awake and alert  Psychiatric: Normal mood, normal affect  Skin: No rashes    I&O's Summary    2023 07:01  -  2023 07:00  --------------------------------------------------------  IN: 1114.5 mL / OUT: 555 mL / NET: 559.5 mL    2023 07:01  -  2023 22:30  --------------------------------------------------------  IN: 650 mL / OUT: 435 mL / NET: 215 mL        LABS:                        11.3   8.55  )-----------( 184      ( 2023 21:53 )             34.7     02-06    135  |  100  |  40<H>  ----------------------------<  125<H>  4.5   |  23  |  1.5    Ca    9.6      2023 20:22  Phos  4.8     02-06  Mg     2.0     02-06        Urinalysis Basic - ( 2023 09:39 )    Color: Yellow / Appearance: Slightly Turbid / S.027 / pH: x  Gluc: x / Ketone: Negative  / Bili: Negative / Urobili: <2 mg/dL   Blood: x / Protein: 30 mg/dL / Nitrite: Negative   Leuk Esterase: Large / RBC: 24 /HPF /  /HPF   Sq Epi: x / Non Sq Epi: 1 /HPF / Bacteria: Many          RADIOLOGY & ADDITIONAL STUDIES: CT Cystogram performed, no obvious bladder rupture.  Findings more consistent with bladder contusion.

## 2023-02-07 NOTE — CONSULT NOTE ADULT - NS ATTEND AMEND GEN_ALL_CORE FT
Pt seen and examined 2/8/23 (note signing was delayed ). agree w above. black draining clear yellow urine. ct ap images visualized by my demonstrating no extravasation of contrast from the bladder. however I would recommend a repeat ct cystogram given bladder not particularly distended on the ct cystogram. bladder should be filled completely.  please alert gu if clinical condition changes. Pt seen and examined 2/8/23 (note signing was delayed ). agree w above. hematuria seen immediately upon catheter insertion however immediately cleared up. black draining clear yellow urine on our exam. ct cystogram images visualized by my demonstrating no extravasation of contrast from the bladder. however I would recommend a repeat ct cystogram given bladder not particularly distended on the ct cystogram. bladder should be filled completely.  please alert gu if clinical condition changes.

## 2023-02-07 NOTE — PROGRESS NOTE ADULT - SUBJECTIVE AND OBJECTIVE BOX
GENERAL SURGERY PROGRESS NOTE    Patient: BRIANNE DELACRUZ , 89y ([id number])Female   MRN: [id number]  Location: [place] [address] A  Visit: 23 Inpatient  Date: 23 @ 01:38    Events of past 24 hours:  Per patient, pain minimal.   Denies nausea/vomiting.  Endorses flatus. Denies BM.  [place].  Hypotensive. Levo started, currently at 0.04.    PAST MEDICAL & SURGICAL HISTORY:  HTN (hypertension)    DM (diabetes mellitus)    CAD (coronary artery disease)    Afib    Bladder cancer  in remission    NHL (non-Hodgkin&#x27;s lymphoma)  in remission    HLD (hyperlipidemia)    Glaucoma    Cataract    Hypothyroidism    Congestive heart failure of unknown etiology    Congestive heart failure of unknown etiology    History of total hysterectomy        Vitals:   T(F): 97 (23 @ 00:00), Max: 98.3 (23 @ 16:00)  HR: 62 (23 @ 01:00)  BP: 136/64 (23 @ 01:00)  RR: 18 (23 @ 01:00)  SpO2: 97% (23 @ 01:00)      Diet, NPO:   Except Medications  With Ice Chips/Sips of Water      Fluids: lactated ringers.: Solution, 1000 milliLiter(s) infuse at 50 mL/Hr, Stop After 20 Hours    PHYSICAL EXAM:  General: NAD, calm and cooperative. AAO x3.  Cardiac: RRR.  Respiratory: On 2L NC. Speaks in complete sentences. No accessory muscles of respiration in use. Bilateral chest rise.  Abdomen: Soft, non-distended, non-tender, no rebound, no guarding.   Neuro: Decreased movement of LLE 2/2 pain. Otherwise moves all extremities.  Vascular: Pulses 2+ throughout, extremities well perfused.  Skin: Warm/dry, normal color, no jaundice.      MEDICATIONS  (STANDING):  acetaminophen     Tablet .. 650 milliGRAM(s) Oral every 6 hours  cefepime   IVPB      cefepime   IVPB 1000 milliGRAM(s) IV Intermittent every 8 hours  donepezil 10 milliGRAM(s) Oral at bedtime  insulin lispro (ADMELOG) corrective regimen sliding scale   SubCutaneous three times a day before meals  lactated ringers. 1000 milliLiter(s) (50 mL/Hr) IV Continuous <Continuous>  levothyroxine 25 MICROGram(s) Oral daily  memantine 5 milliGRAM(s) Oral daily  norepinephrine Infusion 0.05 MICROgram(s)/kG/Min (6.09 mL/Hr) IV Continuous <Continuous>    MEDICATIONS  (PRN):      DVT PROPHYLAXIS:   GI PROPHYLAXIS:   ANTICOAGULATION:   ANTIBIOTICS:  cefepime   IVPB    cefepime   IVPB 1000 milliGRAM(s)      LAB/STUDIES:  Labs:  CAPILLARY BLOOD GLUCOSE      POCT Blood Glucose.: 124 mg/dL (2023 00:00)  POCT Blood Glucose.: 89 mg/dL (2023 16:37)  POCT Blood Glucose.: 105 mg/dL (2023 13:23)                          10.9   8.33  )-----------( 190      ( 2023 20:22 )             33.8       Auto Neutrophil %: 51.9 % (23 @ 20:22)  Auto Immature Granulocyte %: 0.5 % (23 @ 20:22)        135  |  100  |  40<H>  ----------------------------<  125<H>  4.5   |  23  |  1.5      Calcium, Total Serum: 9.6 mg/dL (23 @ 20:22)      LFTs:             7.0  | 0.6  | 142      ------------------[216     ( 2023 21:02 )  3.4  | x    | 72          Lipase:70     Amylase:x         Lactate, Blood: 1.9 mmol/L (23 @ 20:22)  Lactate, Blood: 2.8 mmol/L (23 @ 15:27)  Lactate, Blood: 2.9 mmol/L (23 @ 21:02)      Coags:     15.50  ----< 1.35    ( 2023 21:02 )     29.8        Urinalysis Basic - ( 2023 09:39 )    Color: Yellow / Appearance: Slightly Turbid / S.027 / pH: x  Gluc: x / Ketone: Negative  / Bili: Negative / Urobili: <2 mg/dL   Blood: x / Protein: 30 mg/dL / Nitrite: Negative   Leuk Esterase: Large / RBC: 24 /HPF /  /HPF   Sq Epi: x / Non Sq Epi: 1 /HPF / Bacteria: Many      IMAGING:  < from: CT Angio Abdomen and Pelvis w/ IV Cont (23 @ 03:58) >  IMPRESSION:    No evidence of acute intraabdominal or pelvisarterial extravasation.    On postcontrast images, there is mild hyperemia of the right renal   pelvis, nonspecific. Consider urinalysis to exclude infection.    Colonic diverticulosis with sigmoid colon wall thickening at least in   part due to underdistention. Mild colitis possible. Correlate with   symptoms.    < end of copied text >      < from: Xray Femur 2 Views, Left (23 @ 23:28) >  FINDINGS/  IMPRESSION:    Again seen is a displaced left superior pubic ramus fracture and   questionable nondisplaced inferior pubic ramus fracture. No femoral   fracture seen. Hip osteoarthritis. Vascular calcifications.    < end of copied text >      < from: CT Abdomen and Pelvis No Cont (23 @ 22:15) >    IMPRESSION:    1. Comminuted fracture of the left superior pubic ramus with up to   approximately 1 to 1.5 cm medial displacement of the ramus fracture   fragment. Fracture line extends to the pubic tubercle. Pubic symphysis   articulation appears intact. Associated small left pelvic hemorrhage.    2. Nondisplaced mid left inferior pubic ramus comminuted fracture.    3. Inferior plate compression fracture of L3. There is   prevertebral/periaortic fat stranding in the same region incidentally in   close proximity to the intrarenal aortic aneurysm (series 6 image 319).   Presumably this stranding associated with the fracture suggesting acuity.   Correlate for point tenderness. Please note vascular injury cannot be   excluded on this exam due to lack of intravenous contrast.    4. Nonspecific bilateral lung fibrotic changes with slight upper lung   predominant.    < end of copied text >      < from: CT Cervical Spine No Cont (23 @ 21:50) >  Impression:    No acute fracture or subluxation of the cervical spine.    < end of copied text >      < from: CT Head No Cont (23 @ 21:50) >  IMPRESSION:    No CT evidence of acute intracranial pathology.    < end of copied text >    < from: Xray Pelvis AP only (23 @ 21:05) >  FINDINGS/  IMPRESSION:    Acute displaced fracture left superior pubic ramus. Possible additional   nondisplaced fracture left inferior pubic ramus.    < end of copied text >      < from: Xray Chest 1 View AP/PA (23 @ 21:05) >  Impression:    Upper lobe predominant fibrotic changes. No definite acute consolidation.   Cardiomegaly.    < end of copied text >

## 2023-02-07 NOTE — PHYSICAL THERAPY INITIAL EVALUATION ADULT - NSPTDISCHREC_GEN_A_CORE
Patient requires assistance with functional mobility. Based on today's evaluation, PT recommends D/C to a rehabilitation facility vs home with assistance (dependent on functional outcomes) when medically appropriate. PT recommends rolling walker if discharged to home.

## 2023-02-07 NOTE — PHYSICAL THERAPY INITIAL EVALUATION ADULT - LEVEL OF INDEPENDENCE: GAIT, REHAB EVAL
pt attempted to take a step but could not move her LLE nor weight shift on LLE to move RLE/unable to perform

## 2023-02-07 NOTE — PHYSICAL THERAPY INITIAL EVALUATION ADULT - PERTINENT HX OF CURRENT PROBLEM, REHAB EVAL
L SUPERIOR PUBIC RAMUS FX, L3 FX    89 yo F PMH HTN, DM, Afib on eliquis, dementia, CAD s/p PCI x3 2003, HTN, DLD, COPD on 2L NC, glaucoma/cataract, bladder cancer s/p laser 2016,  non hodgkin's lymphoma in remission last chemotherapy April 2018, vascular dementia, stage 3 CKD (baseline Cr 1.6), seen as  Trauma Alert 02/05 night s/p unwitnessed fall, ?ht, -loc, +eliquis. The patient was in her rocking chair when a family member heard a thud-the patient is unable to describe the events but a family member was at bedside stating she did not have positive head strike. The patient has no external signs of trauma and denies any pain. The patient was found to have left superior pubic rami fracture  with 1.5cm displacement and endplate L3 fracture on trauma work up and admitted to the trauma service for rehab purposes. Orthopedics recommends non-op management.

## 2023-02-07 NOTE — PROGRESS NOTE ADULT - SUBJECTIVE AND OBJECTIVE BOX
BRIANNE DELACRUZ     MRN-[id number]  89y ([birth year]F      Admit Date/LOS: 02-06  Indication for SDU/SICU: hypotension s/p pelvic fracture         ============================  HPI   89 yo F PMH HTN, DM, Afib on eliquis, dementia, CAD s/p PCI x3 2003, HTN, DLD, COPD on 2L NC, glaucoma/cataract, bladder cancer s/p laser 2016,  non hodgkin's lymphoma in remission last chemotherapy April 2018, vascular dementia, stage 3 CKD (baseline Cr 1.6), seen as  Trauma Alert 02/05 night s/p unwitnessed fall, ?ht, -loc, +eliquis. The patient was in her rocking chair when a family member heard a thud-the patient is unable to describe the events but a family member was at bedside stating she did not have positive head strike. The patient has no external signs of trauma and denies any pain. The patient was found to have left superior pubic rami fracture with 1.5cm displacement and endplate L3 fracture on trauma work up and admitted to the trauma service for rehab purposes. Orthopedics recommends non-op management.   SICU consulted for SDU evaluation due to patient's hypotension. Patient became hypotensive with SBP in 70s around 8am this morning; trauma team was notified and patient received 1L LR bolus.   Patient evaluated in ED by SICU team with attending bedside. Patient's BP noted to improve after administration of bolus, BP 96/52 at time of examination. Patient was A&O to person and place, and did not endorse any pain, SOB, CP, numbness, or tingling at time of examination. No external deformities of the legs noted on examination. Patient was able to move all extremities. Thigh compartments soft and compressible. After discussion with attending, patient approved for transfer to SICU for hemodynamic monitoring.        24 Hour Events  2/6 DAY  -Coreg 3.125mg q12 (confirmed w/ daughter)  -blood consent obtained  -lacate 1.9 (2.8)  -awaiting CT Cysto      [X] A ten-point review of systems was otherwise negative except as noted above.  [  ] Due to altered mental status/intubation, subjective information was not attained from the patient. History was obtained, to the extent possible, from review of the chart and collateral sources of information.   BRIANNE DELACRUZ     MRN-[id number]  89y ([birth year])F      Admit Date/LOS:   Indication for SDU/SICU: hypotension s/p pelvic fracture         ============================  HPI   89 yo F PMH HTN, DM, Afib on eliquis, dementia, CAD s/p PCI x3 , HTN, DLD, COPD on 2L NC, glaucoma/cataract, bladder cancer s/p laser ,  non hodgkin's lymphoma in remission last chemotherapy 2018, vascular dementia, stage 3 CKD (baseline Cr 1.6), seen as  Trauma Alert  night s/p unwitnessed fall, ?ht, -loc, +eliquis. The patient was in her rocking chair when a family member heard a thud-the patient is unable to describe the events but a family member was at bedside stating she did not have positive head strike. The patient has no external signs of trauma and denies any pain. The patient was found to have left superior pubic rami fracture with 1.5cm displacement and endplate L3 fracture on trauma work up and admitted to the trauma service for rehab purposes. Orthopedics recommends non-op management.   SICU consulted for SDU evaluation due to patient's hypotension. Patient became hypotensive with SBP in 70s around 8am this morning; trauma team was notified and patient received 1L LR bolus.   Patient evaluated in ED by SICU team with attending bedside. Patient's BP noted to improve after administration of bolus, BP 96/52 at time of examination. Patient was A&O to person and place, and did not endorse any pain, SOB, CP, numbness, or tingling at time of examination. No external deformities of the legs noted on examination. Patient was able to move all extremities. Thigh compartments soft and compressible. After discussion with attending, patient approved for transfer to SICU for hemodynamic monitoring.        24 Hour Events  2/6 DAY  -Coreg 3.125mg q12 (confirmed w/ daughter)  -blood consent obtained  -lacate 1.9 (2.8)  -awaiting CT Cysto      [X] A ten-point review of systems was otherwise negative except as noted above.  [  ] Due to altered mental status/intubation, subjective information was not attained from the patient. History was obtained, to the extent possible, from review of the chart and collateral sources of information.  Daily     Daily     Diet, NPO:   Except Medications  With Ice Chips/Sips of Water (23 @ 11:23)      CURRENT MEDS:  Neurologic Medications  acetaminophen     Tablet .. 650 milliGRAM(s) Oral every 6 hours  donepezil 10 milliGRAM(s) Oral at bedtime  memantine 5 milliGRAM(s) Oral daily    Respiratory Medications    Cardiovascular Medications  norepinephrine Infusion 0.05 MICROgram(s)/kG/Min IV Continuous <Continuous>    Gastrointestinal Medications  lactated ringers. 1000 milliLiter(s) IV Continuous <Continuous>    Genitourinary Medications    Hematologic/Oncologic Medications    Antimicrobial/Immunologic Medications  cefepime   IVPB      cefepime   IVPB 1000 milliGRAM(s) IV Intermittent every 8 hours    Endocrine/Metabolic Medications  insulin lispro (ADMELOG) corrective regimen sliding scale   SubCutaneous three times a day before meals  levothyroxine 25 MICROGram(s) Oral daily    Topical/Other Medications      ICU Vital Signs Last 24 Hrs  T(C): 35.9 (2023 07:43), Max: 36.8 (2023 16:00)  T(F): 96.6 (2023 07:43), Max: 98.3 (2023 16:00)  HR: 62 (2023 08:00) (57 - 98)  BP: 132/80 (2023 07:00) (70/47 - 167/77)  BP(mean): 101 (2023 07:00) (54 - 111)  ABP: 111/57 (2023 08:00) (94/45 - 148/81)  ABP(mean): 75 (2023 08:00) (58 - 105)  RR: 14 (2023 08:00) (14 - 24)  SpO2: 97% (2023 08:00) (87% - 100%)    O2 Parameters below as of 2023 08:00  Patient On (Oxygen Delivery Method): nasal cannula  O2 Flow (L/min): 2                  I&O's Summary    2023 07:01  -  2023 07:00  --------------------------------------------------------  IN: 1114.5 mL / OUT: 555 mL / NET: 559.5 mL      I&O's Detail    2023 07:01  -  2023 07:00  --------------------------------------------------------  IN:    IV PiggyBack: 100 mL    Lactated Ringers: 700 mL    Lactated Ringers: 250 mL    Norepinephrine: 54.5 mL    Oral Fluid: 10 mL  Total IN: 1114.5 mL    OUT:    Indwelling Catheter - Urethral (mL): 455 mL    Ureteral Catheter (mL): 100 mL  Total OUT: 555 mL    Total NET: 559.5 mL          PHYSICAL EXAM:  Physical Exam:     Exam: A&O to person, unaware of place, day/year/season. No focal deficits.     RESPIRATORY: Saturating well on 2L NC. CTAB. Normal respiratory effort/expansion.    CARDIOVASCULAR: S1/S2. Normal RR.    GASTROINTESTINAL:  Abdomen soft, non-tender, non-distended    MUSCULOSKELETAL:  Extremities warm, pink, well-perfused. +DP b/l. No swelling/erythema/ecchymoses or deformities/lacerations/abrasions of the extremities noted. Slight TTP over L superior pubic ramus. Pt able to bend and straighten both legs.     DERM:  No skin breakdown. Scar from previous chemoport noted below R clavicle.     :   Exam: Fortune catheter in place.       CXR:     LABS:  CAPILLARY BLOOD GLUCOSE      POCT Blood Glucose.: 119 mg/dL (2023 05:58)  POCT Blood Glucose.: 124 mg/dL (2023 00:00)  POCT Blood Glucose.: 89 mg/dL (2023 16:37)  POCT Blood Glucose.: 105 mg/dL (2023 13:23)                          10.9   8.33  )-----------( 190      ( 2023 20:22 )             33.8       02-06    135  |  100  |  40<H>  ----------------------------<  125<H>  4.5   |  23  |  1.5    Ca    9.6      2023 20:22  Phos  4.8     -  Mg     2.0         TPro  7.0  /  Alb  3.4<L>  /  TBili  0.6  /  DBili  x   /  AST  142<H>  /  ALT  72<H>  /  AlkPhos  216<H>  02      PT/INR - ( 2023 21:02 )   PT: 15.50 sec;   INR: 1.35 ratio         PTT - ( 2023 21:02 )  PTT:29.8 sec      Urinalysis Basic - ( 2023 09:39 )    Color: Yellow / Appearance: Slightly Turbid / S.027 / pH: x  Gluc: x / Ketone: Negative  / Bili: Negative / Urobili: <2 mg/dL   Blood: x / Protein: 30 mg/dL / Nitrite: Negative   Leuk Esterase: Large / RBC: 24 /HPF /  /HPF   Sq Epi: x / Non Sq Epi: 1 /HPF / Bacteria: Many

## 2023-02-07 NOTE — PROGRESS NOTE ADULT - SUBJECTIVE AND OBJECTIVE BOX
INTERVAL HPI/OVERNIGHT EVENTS:    SUBJECTIVE: Patient seen and examined at bedside.     cc: fall  no cp, sob, abd pain, fever  no ha, dizziness, lightheadedness, syncope    OBJECTIVE:    VITAL SIGNS:  Vital Signs Last 24 Hrs  T(C): 35.9 (2023 07:43), Max: 36.8 (2023 16:00)  T(F): 96.6 (2023 07:43), Max: 98.3 (2023 16:00)  HR: 60 (2023 13:30) (54 - 98)  BP: 132/80 (2023 07:00) (75/52 - 167/77)  BP(mean): 101 (2023 07:00) (54 - 111)  RR: 10 (2023 13:00) (10 - 24)  SpO2: 100% (2023 13:00) (87% - 100%)    Parameters below as of 2023 13:00  Patient On (Oxygen Delivery Method): nasal cannula  O2 Flow (L/min): 2        PHYSICAL EXAM:    General: NAD  HEENT: NC/AT; PERRL, clear conjunctiva  Neck: supple  Respiratory: CTA b/l  Cardiovascular: +S1/S2; RRR  Abdomen: soft, NT/ND; +BS x4  Extremities: WWP, 2+ peripheral pulses b/l; no LE edema  Skin: normal color and turgor; no rash  Neurological:    MEDICATIONS:  MEDICATIONS  (STANDING):  acetaminophen     Tablet .. 650 milliGRAM(s) Oral every 6 hours  cefepime   IVPB      cefepime   IVPB 1000 milliGRAM(s) IV Intermittent every 8 hours  chlorhexidine 2% Cloths 1 Application(s) Topical <User Schedule>  donepezil 10 milliGRAM(s) Oral at bedtime  heparin   Injectable 5000 Unit(s) SubCutaneous every 8 hours  insulin lispro (ADMELOG) corrective regimen sliding scale   SubCutaneous three times a day before meals  lactated ringers. 1000 milliLiter(s) (50 mL/Hr) IV Continuous <Continuous>  levothyroxine 25 MICROGram(s) Oral daily  memantine 5 milliGRAM(s) Oral daily  midodrine 5 milliGRAM(s) Oral every 8 hours  norepinephrine Infusion 0.05 MICROgram(s)/kG/Min (6.09 mL/Hr) IV Continuous <Continuous>    MEDICATIONS  (PRN):      ALLERGIES:  Allergies    iodine (Rash)  lisinopril (Rash)    Intolerances        LABS:                        10.9   8.33  )-----------( 190      ( 2023 20:22 )             33.8     Hemoglobin: 10.9 g/dL ( @ 20:22)  Hemoglobin: 11.1 g/dL ( @ 13:41)  Hemoglobin: 10.4 g/dL ( @ 10:30)  Hemoglobin: 12.2 g/dL ( @ 21:02)    CBC Full  -  ( 2023 20:22 )  WBC Count : 8.33 K/uL  RBC Count : 4.20 M/uL  Hemoglobin : 10.9 g/dL  Hematocrit : 33.8 %  Platelet Count - Automated : 190 K/uL  Mean Cell Volume : 80.5 fL  Mean Cell Hemoglobin : 26.0 pg  Mean Cell Hemoglobin Concentration : 32.2 g/dL  Auto Neutrophil # : 4.32 K/uL  Auto Lymphocyte # : 2.45 K/uL  Auto Monocyte # : 1.22 K/uL  Auto Eosinophil # : 0.23 K/uL  Auto Basophil # : 0.07 K/uL  Auto Neutrophil % : 51.9 %  Auto Lymphocyte % : 29.4 %  Auto Monocyte % : 14.6 %  Auto Eosinophil % : 2.8 %  Auto Basophil % : 0.8 %        135  |  100  |  40<H>  ----------------------------<  125<H>  4.5   |  23  |  1.5    Ca    9.6      2023 20:22  Phos  4.8     -  Mg     2.0     -    TPro  7.0  /  Alb  3.4<L>  /  TBili  0.6  /  DBili  x   /  AST  142<H>  /  ALT  72<H>  /  AlkPhos  216<H>      Creatinine Trend: 1.5<--, 1.6<--, 1.5<--  LIVER FUNCTIONS - ( 2023 21:02 )  Alb: 3.4 g/dL / Pro: 7.0 g/dL / ALK PHOS: 216 U/L / ALT: 72 U/L / AST: 142 U/L / GGT: x           PT/INR - ( 2023 21:02 )   PT: 15.50 sec;   INR: 1.35 ratio         PTT - ( 2023 21:02 )  PTT:29.8 sec    hs Troponin:            Urinalysis Basic - ( 2023 09:39 )    Color: Yellow / Appearance: Slightly Turbid / S.027 / pH: x  Gluc: x / Ketone: Negative  / Bili: Negative / Urobili: <2 mg/dL   Blood: x / Protein: 30 mg/dL / Nitrite: Negative   Leuk Esterase: Large / RBC: 24 /HPF /  /HPF   Sq Epi: x / Non Sq Epi: 1 /HPF / Bacteria: Many      CSF:                      EKG:   MICROBIOLOGY:    Culture - Urine (collected 2023 11:11)  Source: Catheterized Catheterized  Preliminary Report (2023 14:31):    >100,000 CFU/ml Escherichia coli      IMAGING:      Labs, imaging, EKG personally reviewed    RADIOLOGY & ADDITIONAL TESTS: Reviewed.

## 2023-02-07 NOTE — CONSULT NOTE ADULT - ASSESSMENT
No Evidence of Bladder Rupture on CT Cystogram s/p Fall with multiple Pelvic Fx's- r/o Bladder Rupture   No Evidence of Bladder Rupture on CT Cystogram

## 2023-02-07 NOTE — PROGRESS NOTE ADULT - ASSESSMENT
89 yo female s/p unwitnessed fall, ?ht, -loc, +Eliquis    INJURIES:  -LEFT superior pubic rami fx w/ 1.5cm displacement  -Inferior plate compression fracture of L3.       NEURO:  #Acute pain    -APAP  #endplate L3 fracture   -NSx consulted: no nsx intervention, abdominal binder and TLSO brace, ok for OOB with PT     -f/u TLSO brace  #hx vascular dementia     -donezepil 10 milliGRAM(s) Oral   memantine 5 milliGRAM(s) Oral     RESP:   #hx COPD (home 2L oxygen)    -maintain saturation >88%  -daily CXR  -encourage IS   #Activity    -increase as tolerated      CARDS:   #hypotension most likely secondary to hypovolemia     -requiring levophed improving    -NICOM --NICOM negative. SVI 20, CI 1.2, TPR 2539  #hx Afib  -carvedilol 3.125 mg q12    -Eliquis 2.5mg BID HOLDING   #hx HTN    -previous irbesartan and Imdur discontinued  #Hx CHF    -Cardiology c/s placed   -last echo in Jan EF 25-30%    -repeat pending  #hx CAD s/p PCI    GI/NUTR:   #Diet   -currently NPO except meds with sips & chips  -advance based on bedside dysphagia evaluation, d/w team requirement for SLP  -aspiration precautions, HOB 30  #GI Prophylaxis    -not indicated  #Bowel regimen    -not indicated    /RENAL:   #urine output in critically ill    -indwelling black (placed in ED 2/6)  #blood on UA  -CT cysto ordered  #maintenance fluid  LR @50ml/hr IVL once on diet  #hx CKD (baseline Cr 1.6)  -baseline Cr 1.6   #hx bladder cancer s/p laser 2016          BUN/Cr- 40/1.6  -->,  40/1.5  -->          Electrolytes-Na 135 // K 4.5 // Mg 2.0 //  Phos 4.8 (02-06 @ 20:22)         HEME/ONC:   #DVT prophylaxis-HOLDING, SCDs    -RESTART HSQ  #hx of Afib    -HOLDING eliquis  #non hodgkin's lymphoma in remission, last chemotherapy April 2018    Labs: Hb/Hct:  11.1/34.9  -->,  10.9/33.8  -->                      Plts:  162  -->,  190  -->       ID:  #UTI present on admission    -cefepime 1g q8    -d/w team duration, current order no end date  WBC- 9.35  --->>,  8.71  --->>,  8.33  --->>  Temp trend- 24hrs T(F): 97 (02-07 @ 00:00), Max: 98.3 (02-06 @ 16:00)  Culture:  -per ortho no surgical intervention, LLE Weight Bearing as Tolerated   ENDO:    #hx DM  -on ISS    #hx hypothyroidism   -synthroid 25mcg PO daily     MSK:  #left superior pubic rami fracture with 1.5cm displacement   -per ortho no surgical intervention, LLE WBAT   #acute stage 2 DTI    -wound consult placed  #PT/OT consult    LINES/DRAINS:  PIV, Black (2/6), Lt radial a-line (2/6)      ADVANCED DIRECTIVES:  DNR/DNI, MOLST completed    HCP/Emergency Contact-Sarah Chaney (daughter) [phone]    -per ortho no surgical intervention, LLE Weight Bearing as Tolerated ubis, HYPOtension      DISPO:   SICU

## 2023-02-07 NOTE — PHYSICAL THERAPY INITIAL EVALUATION ADULT - GAIT TRAINING, PT EVAL
Goal: pt will ambulate w/ rolling walker with contact guard 25 feet by discharge to facilitate return to PLOF.

## 2023-02-07 NOTE — PHYSICAL THERAPY INITIAL EVALUATION ADULT - ADDITIONAL COMMENTS
pt lives with family in a house with 6 steps to enter and none inside. pt uses a rolling walker with assistance to ambulate and has 24 HHA (2 x 12 hr) x 7 days

## 2023-02-07 NOTE — PROGRESS NOTE ADULT - ASSESSMENT
89y Female  w/ PMHx of dementia, hld, htn seen as  Trauma Alert s/p unwitnessed fall, ?ht, -loc. +eliquis with no complaints, no external signs of trauma . Trauma assessment in ED: ABCs intact , GCS 15 , AAOx3,  ELIZONDO.     Injuries identified:   - LEFT SUPERIOR PUBIC RAMI FX (1.5CM DISPLACEMENT)  -ENDPLATE L3 FRACTURE    PLAN:   - Care per SICU  - Ortho- WBAT LLE  -Neurosurgery - TLSO brace  -Pain control  -Home medications  -Q4 vitals  -Q4 ins and outs  -PT/Rehab    Trauma/ACS  Spectra [phone] 89y Female  w/ PMHx of dementia, hld, htn seen as  Trauma Alert s/p unwitnessed fall, ?ht, -loc. +eliquis with no complaints, no external signs of trauma . Trauma assessment in ED: ABCs intact , GCS 15 , AAOx3,  ELIZONDO.     Injuries identified:   - LEFT SUPERIOR PUBIC RAMI FX (1.5CM DISPLACEMENT)  -ENDPLATE L3 FRACTURE    PLAN:   - Care per SICU  - Ortho- WBAT LLE  -Neurosurgery - TLSO brace  - Wean pressors as tolerated  -Pain control  -Home medications  -Q4 vitals  -Q4 ins and outs  -PT/Rehab    Trauma/ACS  Spectra [phone]

## 2023-02-07 NOTE — CONSULT NOTE ADULT - PROBLEM SELECTOR RECOMMENDATION 9
Keep Fortune catheter to gravity drainage for now  Will d/w Attending Keep Black catheter to gravity drainage for now  Will d/w Attending    ADDENDUM 2/8 at 1408:  -Pt seen and examined earlier this afternoon with Dr. Alexandra, imaging reviewed  -Continue black for now -- draining clear yellow  -Repeat CT cystogram on day of discharge (bladder underdistended on initial imaging) to determine whether black can be removed  -Will follow-up

## 2023-02-07 NOTE — PHYSICAL THERAPY INITIAL EVALUATION ADULT - LEVEL OF INDEPENDENCE: BED TO CHAIR, REHAB EVAL
pt was in too much pain and was pushing into extension when she returned to sitting at EOB, requiring 2 person dependent transfer back to supine/unable to perform

## 2023-02-08 LAB
-  AMIKACIN: SIGNIFICANT CHANGE UP
-  AMOXICILLIN/CLAVULANIC ACID: SIGNIFICANT CHANGE UP
-  AMPICILLIN/SULBACTAM: SIGNIFICANT CHANGE UP
-  AMPICILLIN: SIGNIFICANT CHANGE UP
-  AZTREONAM: SIGNIFICANT CHANGE UP
-  CEFAZOLIN: SIGNIFICANT CHANGE UP
-  CEFEPIME: SIGNIFICANT CHANGE UP
-  CEFOXITIN: SIGNIFICANT CHANGE UP
-  CEFTRIAXONE: SIGNIFICANT CHANGE UP
-  CEFUROXIME: SIGNIFICANT CHANGE UP
-  CIPROFLOXACIN: SIGNIFICANT CHANGE UP
-  ERTAPENEM: SIGNIFICANT CHANGE UP
-  GENTAMICIN: SIGNIFICANT CHANGE UP
-  IMIPENEM: SIGNIFICANT CHANGE UP
-  LEVOFLOXACIN: SIGNIFICANT CHANGE UP
-  MEROPENEM: SIGNIFICANT CHANGE UP
-  NITROFURANTOIN: SIGNIFICANT CHANGE UP
-  PIPERACILLIN/TAZOBACTAM: SIGNIFICANT CHANGE UP
-  TOBRAMYCIN: SIGNIFICANT CHANGE UP
-  TRIMETHOPRIM/SULFAMETHOXAZOLE: SIGNIFICANT CHANGE UP
ANION GAP SERPL CALC-SCNC: 11 MMOL/L — SIGNIFICANT CHANGE UP (ref 7–14)
ANION GAP SERPL CALC-SCNC: 8 MMOL/L — SIGNIFICANT CHANGE UP (ref 7–14)
BASOPHILS # BLD AUTO: 0.09 K/UL — SIGNIFICANT CHANGE UP (ref 0–0.2)
BASOPHILS NFR BLD AUTO: 1 % — SIGNIFICANT CHANGE UP (ref 0–1)
BUN SERPL-MCNC: 38 MG/DL — HIGH (ref 10–20)
BUN SERPL-MCNC: 47 MG/DL — HIGH (ref 10–20)
CALCIUM SERPL-MCNC: 9.2 MG/DL — SIGNIFICANT CHANGE UP (ref 8.4–10.4)
CALCIUM SERPL-MCNC: 9.6 MG/DL — SIGNIFICANT CHANGE UP (ref 8.4–10.5)
CHLORIDE SERPL-SCNC: 100 MMOL/L — SIGNIFICANT CHANGE UP (ref 98–110)
CHLORIDE SERPL-SCNC: 104 MMOL/L — SIGNIFICANT CHANGE UP (ref 98–110)
CK MB CFR SERPL CALC: 2.4 NG/ML — SIGNIFICANT CHANGE UP (ref 0.6–6.3)
CK SERPL-CCNC: 65 U/L — SIGNIFICANT CHANGE UP (ref 0–225)
CO2 SERPL-SCNC: 24 MMOL/L — SIGNIFICANT CHANGE UP (ref 17–32)
CO2 SERPL-SCNC: 24 MMOL/L — SIGNIFICANT CHANGE UP (ref 17–32)
CREAT SERPL-MCNC: 1.5 MG/DL — SIGNIFICANT CHANGE UP (ref 0.7–1.5)
CREAT SERPL-MCNC: 1.6 MG/DL — HIGH (ref 0.7–1.5)
CULTURE RESULTS: SIGNIFICANT CHANGE UP
EGFR: 30 ML/MIN/1.73M2 — LOW
EGFR: 33 ML/MIN/1.73M2 — LOW
EOSINOPHIL # BLD AUTO: 0.67 K/UL — SIGNIFICANT CHANGE UP (ref 0–0.7)
EOSINOPHIL NFR BLD AUTO: 7.2 % — SIGNIFICANT CHANGE UP (ref 0–8)
GLUCOSE BLDC GLUCOMTR-MCNC: 115 MG/DL — HIGH (ref 70–99)
GLUCOSE BLDC GLUCOMTR-MCNC: 119 MG/DL — HIGH (ref 70–99)
GLUCOSE BLDC GLUCOMTR-MCNC: 125 MG/DL — HIGH (ref 70–99)
GLUCOSE BLDC GLUCOMTR-MCNC: 138 MG/DL — HIGH (ref 70–99)
GLUCOSE BLDC GLUCOMTR-MCNC: 202 MG/DL — HIGH (ref 70–99)
GLUCOSE SERPL-MCNC: 130 MG/DL — HIGH (ref 70–99)
GLUCOSE SERPL-MCNC: 148 MG/DL — HIGH (ref 70–99)
HCT VFR BLD CALC: 33.4 % — LOW (ref 37–47)
HGB BLD-MCNC: 10.9 G/DL — LOW (ref 12–16)
IMM GRANULOCYTES NFR BLD AUTO: 0.4 % — HIGH (ref 0.1–0.3)
LYMPHOCYTES # BLD AUTO: 2.8 K/UL — SIGNIFICANT CHANGE UP (ref 1.2–3.4)
LYMPHOCYTES # BLD AUTO: 30.3 % — SIGNIFICANT CHANGE UP (ref 20.5–51.1)
MAGNESIUM SERPL-MCNC: 2 MG/DL — SIGNIFICANT CHANGE UP (ref 1.8–2.4)
MAGNESIUM SERPL-MCNC: 2.1 MG/DL — SIGNIFICANT CHANGE UP (ref 1.8–2.4)
MCHC RBC-ENTMCNC: 26.5 PG — LOW (ref 27–31)
MCHC RBC-ENTMCNC: 32.6 G/DL — SIGNIFICANT CHANGE UP (ref 32–37)
MCV RBC AUTO: 81.1 FL — SIGNIFICANT CHANGE UP (ref 81–99)
METHOD TYPE: SIGNIFICANT CHANGE UP
MONOCYTES # BLD AUTO: 1.38 K/UL — HIGH (ref 0.1–0.6)
MONOCYTES NFR BLD AUTO: 14.9 % — HIGH (ref 1.7–9.3)
NEUTROPHILS # BLD AUTO: 4.27 K/UL — SIGNIFICANT CHANGE UP (ref 1.4–6.5)
NEUTROPHILS NFR BLD AUTO: 46.2 % — SIGNIFICANT CHANGE UP (ref 42.2–75.2)
NRBC # BLD: 0 /100 WBCS — SIGNIFICANT CHANGE UP (ref 0–0)
ORGANISM # SPEC MICROSCOPIC CNT: SIGNIFICANT CHANGE UP
ORGANISM # SPEC MICROSCOPIC CNT: SIGNIFICANT CHANGE UP
PHOSPHATE SERPL-MCNC: 2.4 MG/DL — SIGNIFICANT CHANGE UP (ref 2.1–4.9)
PHOSPHATE SERPL-MCNC: 3.3 MG/DL — SIGNIFICANT CHANGE UP (ref 2.1–4.9)
PLATELET # BLD AUTO: 190 K/UL — SIGNIFICANT CHANGE UP (ref 130–400)
POTASSIUM SERPL-MCNC: 4.3 MMOL/L — SIGNIFICANT CHANGE UP (ref 3.5–5)
POTASSIUM SERPL-MCNC: 4.5 MMOL/L — SIGNIFICANT CHANGE UP (ref 3.5–5)
POTASSIUM SERPL-SCNC: 4.3 MMOL/L — SIGNIFICANT CHANGE UP (ref 3.5–5)
POTASSIUM SERPL-SCNC: 4.5 MMOL/L — SIGNIFICANT CHANGE UP (ref 3.5–5)
RBC # BLD: 4.12 M/UL — LOW (ref 4.2–5.4)
RBC # FLD: 20.3 % — HIGH (ref 11.5–14.5)
SODIUM SERPL-SCNC: 135 MMOL/L — SIGNIFICANT CHANGE UP (ref 135–146)
SODIUM SERPL-SCNC: 136 MMOL/L — SIGNIFICANT CHANGE UP (ref 135–146)
SPECIMEN SOURCE: SIGNIFICANT CHANGE UP
TROPONIN T SERPL-MCNC: 0.01 NG/ML — SIGNIFICANT CHANGE UP
TROPONIN T SERPL-MCNC: 0.02 NG/ML — HIGH
TROPONIN T SERPL-MCNC: <0.01 NG/ML — SIGNIFICANT CHANGE UP
WBC # BLD: 9.25 K/UL — SIGNIFICANT CHANGE UP (ref 4.8–10.8)
WBC # FLD AUTO: 9.25 K/UL — SIGNIFICANT CHANGE UP (ref 4.8–10.8)

## 2023-02-08 PROCEDURE — 99291 CRITICAL CARE FIRST HOUR: CPT

## 2023-02-08 PROCEDURE — 99222 1ST HOSP IP/OBS MODERATE 55: CPT

## 2023-02-08 PROCEDURE — 93010 ELECTROCARDIOGRAM REPORT: CPT | Mod: 76

## 2023-02-08 PROCEDURE — 93306 TTE W/DOPPLER COMPLETE: CPT | Mod: 26

## 2023-02-08 RX ORDER — METOPROLOL TARTRATE 50 MG
5 TABLET ORAL ONCE
Refills: 0 | Status: COMPLETED | OUTPATIENT
Start: 2023-02-08 | End: 2023-02-08

## 2023-02-08 RX ORDER — METOPROLOL TARTRATE 50 MG
2.5 TABLET ORAL ONCE
Refills: 0 | Status: COMPLETED | OUTPATIENT
Start: 2023-02-08 | End: 2023-02-08

## 2023-02-08 RX ORDER — CARVEDILOL PHOSPHATE 80 MG/1
3.12 CAPSULE, EXTENDED RELEASE ORAL EVERY 12 HOURS
Refills: 0 | Status: DISCONTINUED | OUTPATIENT
Start: 2023-02-08 | End: 2023-02-08

## 2023-02-08 RX ORDER — MIDODRINE HYDROCHLORIDE 2.5 MG/1
10 TABLET ORAL EVERY 8 HOURS
Refills: 0 | Status: DISCONTINUED | OUTPATIENT
Start: 2023-02-08 | End: 2023-02-14

## 2023-02-08 RX ORDER — SENNA PLUS 8.6 MG/1
2 TABLET ORAL AT BEDTIME
Refills: 0 | Status: DISCONTINUED | OUTPATIENT
Start: 2023-02-08 | End: 2023-02-14

## 2023-02-08 RX ORDER — CARVEDILOL PHOSPHATE 80 MG/1
3.12 CAPSULE, EXTENDED RELEASE ORAL EVERY 12 HOURS
Refills: 0 | Status: DISCONTINUED | OUTPATIENT
Start: 2023-02-08 | End: 2023-02-10

## 2023-02-08 RX ADMIN — MIDODRINE HYDROCHLORIDE 10 MILLIGRAM(S): 2.5 TABLET ORAL at 04:18

## 2023-02-08 RX ADMIN — DONEPEZIL HYDROCHLORIDE 10 MILLIGRAM(S): 10 TABLET, FILM COATED ORAL at 21:39

## 2023-02-08 RX ADMIN — Medication 4: at 11:33

## 2023-02-08 RX ADMIN — Medication 5 MILLIGRAM(S): at 04:41

## 2023-02-08 RX ADMIN — Medication 2.5 MILLIGRAM(S): at 05:27

## 2023-02-08 RX ADMIN — Medication 650 MILLIGRAM(S): at 12:52

## 2023-02-08 RX ADMIN — MEMANTINE HYDROCHLORIDE 5 MILLIGRAM(S): 10 TABLET ORAL at 12:22

## 2023-02-08 RX ADMIN — CEFEPIME 100 MILLIGRAM(S): 1 INJECTION, POWDER, FOR SOLUTION INTRAMUSCULAR; INTRAVENOUS at 21:25

## 2023-02-08 RX ADMIN — Medication 650 MILLIGRAM(S): at 00:20

## 2023-02-08 RX ADMIN — MIDODRINE HYDROCHLORIDE 10 MILLIGRAM(S): 2.5 TABLET ORAL at 13:34

## 2023-02-08 RX ADMIN — Medication 25 MICROGRAM(S): at 05:26

## 2023-02-08 RX ADMIN — HEPARIN SODIUM 5000 UNIT(S): 5000 INJECTION INTRAVENOUS; SUBCUTANEOUS at 05:24

## 2023-02-08 RX ADMIN — CEFEPIME 100 MILLIGRAM(S): 1 INJECTION, POWDER, FOR SOLUTION INTRAMUSCULAR; INTRAVENOUS at 13:35

## 2023-02-08 RX ADMIN — Medication 650 MILLIGRAM(S): at 12:22

## 2023-02-08 RX ADMIN — Medication 650 MILLIGRAM(S): at 17:35

## 2023-02-08 RX ADMIN — HEPARIN SODIUM 5000 UNIT(S): 5000 INJECTION INTRAVENOUS; SUBCUTANEOUS at 21:39

## 2023-02-08 RX ADMIN — Medication 650 MILLIGRAM(S): at 05:27

## 2023-02-08 RX ADMIN — CEFEPIME 100 MILLIGRAM(S): 1 INJECTION, POWDER, FOR SOLUTION INTRAMUSCULAR; INTRAVENOUS at 05:25

## 2023-02-08 RX ADMIN — Medication 650 MILLIGRAM(S): at 17:11

## 2023-02-08 RX ADMIN — SENNA PLUS 2 TABLET(S): 8.6 TABLET ORAL at 21:40

## 2023-02-08 RX ADMIN — Medication 5 MILLIGRAM(S): at 06:44

## 2023-02-08 RX ADMIN — Medication 6.09 MICROGRAM(S)/KG/MIN: at 12:22

## 2023-02-08 RX ADMIN — HEPARIN SODIUM 5000 UNIT(S): 5000 INJECTION INTRAVENOUS; SUBCUTANEOUS at 13:35

## 2023-02-08 RX ADMIN — MIDODRINE HYDROCHLORIDE 10 MILLIGRAM(S): 2.5 TABLET ORAL at 21:39

## 2023-02-08 RX ADMIN — SODIUM CHLORIDE 50 MILLILITER(S): 9 INJECTION, SOLUTION INTRAVENOUS at 13:34

## 2023-02-08 RX ADMIN — CARVEDILOL PHOSPHATE 3.12 MILLIGRAM(S): 80 CAPSULE, EXTENDED RELEASE ORAL at 05:48

## 2023-02-08 RX ADMIN — Medication 650 MILLIGRAM(S): at 06:30

## 2023-02-08 NOTE — DIETITIAN INITIAL EVALUATION ADULT - PERTINENT MEDS FT
MEDICATIONS  (STANDING):  acetaminophen     Tablet .. 650 milliGRAM(s) Oral every 6 hours  carvedilol 3.125 milliGRAM(s) Oral every 12 hours  cefepime   IVPB      cefepime   IVPB 1000 milliGRAM(s) IV Intermittent every 8 hours  chlorhexidine 2% Cloths 1 Application(s) Topical <User Schedule>  donepezil 10 milliGRAM(s) Oral at bedtime  heparin   Injectable 5000 Unit(s) SubCutaneous every 8 hours  insulin lispro (ADMELOG) corrective regimen sliding scale   SubCutaneous three times a day before meals  lactated ringers. 1000 milliLiter(s) (50 mL/Hr) IV Continuous <Continuous>  levothyroxine 25 MICROGram(s) Oral daily  memantine 5 milliGRAM(s) Oral daily  midodrine 10 milliGRAM(s) Oral every 8 hours  norepinephrine Infusion 0.05 MICROgram(s)/kG/Min (6.09 mL/Hr) IV Continuous <Continuous>    MEDICATIONS  (PRN):

## 2023-02-08 NOTE — DIETITIAN INITIAL EVALUATION ADULT - OTHER CALCULATIONS
Kca: 1231-1539kcal/day based on MSJ 1026*SF 1.2-1.5 - PCM Kca: 1231-1539kcal/day based on MSJ 1026*SF 1.2-1.5 - PCM, pressure injury

## 2023-02-08 NOTE — PROGRESS NOTE ADULT - SUBJECTIVE AND OBJECTIVE BOX
Patient is a 89y old  Female who presents with a chief complaint of Other specified fracture of unspecified pubis, initial encounter for closed fracture      HPI:  89yF w/ PMHx of HTN, DM, Dementia seen as  Trauma Alert s/p unwitnessed fall, ?ht, -loc, +eliquis. The patient was in her rocking chair when a family member heard a thud-the patient is unable to describe the events but a family member was at bedside stating she did not have positive head strike. The patient has no external signs of trauma and denies any pain. The patient was found to have pelvic fractures on trauma work up and admitted to the trauma service for rehab purposes. Orthopedics recommends non-op management.     INJURIES:  -LEFT superior pubic rami fx w/ 1.5cm displacement  -Inferior plate compression fracture of L3.     Medical charts / labs / imaging studies / PT notes reviewed     PHYSICAL EXAM    Vital Signs Last 24 Hrs  T(C): 36.2 (08 Feb 2023 08:00), Max: 36.2 (08 Feb 2023 08:00)  T(F): 97.2 (08 Feb 2023 08:00), Max: 97.2 (08 Feb 2023 08:00)  HR: 97 (08 Feb 2023 11:00) (52 - 128)  BP: 93/61 (08 Feb 2023 11:00) (86/50 - 143/70)  BP(mean): 70 (08 Feb 2023 11:00) (60 - 106)  RR: 16 (08 Feb 2023 11:00) (10 - 23)  SpO2: 100% (08 Feb 2023 11:00) (89% - 100%)    Parameters below as of 08 Feb 2023 11:00  Patient On (Oxygen Delivery Method): nasal cannula  O2 Flow (L/min): 2      Constitutional - NAD  Chest - CTA  Cardiovascular - S1S2+  Abdomen -  Soft  Extremities -  No calf tenderness   Function : bed mobility dependent / transfer mod A                 unable to ambulate    acetaminophen     Tablet .. 650 milliGRAM(s) Oral every 6 hours  carvedilol 3.125 milliGRAM(s) Oral every 12 hours  cefepime   IVPB      cefepime   IVPB 1000 milliGRAM(s) IV Intermittent every 8 hours  chlorhexidine 2% Cloths 1 Application(s) Topical <User Schedule>  donepezil 10 milliGRAM(s) Oral at bedtime  heparin   Injectable 5000 Unit(s) SubCutaneous every 8 hours  insulin lispro (ADMELOG) corrective regimen sliding scale   SubCutaneous three times a day before meals  lactated ringers. 1000 milliLiter(s) IV Continuous <Continuous>  levothyroxine 25 MICROGram(s) Oral daily  memantine 5 milliGRAM(s) Oral daily  midodrine 10 milliGRAM(s) Oral every 8 hours  norepinephrine Infusion 0.05 MICROgram(s)/kG/Min IV Continuous <Continuous>  senna 2 Tablet(s) Oral at bedtime      RECENT LABS/IMAGING                        11.3   8.55  )-----------( 184      ( 07 Feb 2023 21:53 )             34.7     02-07    135  |  100  |  47<H>  ----------------------------<  130<H>  4.5   |  24  |  1.6<H>    Ca    9.6      07 Feb 2023 23:21  Phos  3.3     02-07  Mg     2.1     02-07

## 2023-02-08 NOTE — DIETITIAN INITIAL EVALUATION ADULT - NUTRITIONGOAL OUTCOME1
pt to meet >75% estimated needs in 5 days. RD to follow as per high risk protocol.  Monitor diet order, kcal intake, body composition, NFPE, labs  (lytes, BG, renal indices)

## 2023-02-08 NOTE — PROGRESS NOTE ADULT - ASSESSMENT
89y Female  w/ PMHx of dementia, hld, htn seen as  Trauma Alert s/p unwitnessed fall, ?ht, -loc. +eliquis with no complaints, no external signs of trauma . Trauma assessment in ED: ABCs intact , GCS 15 , AAOx3,  ELIZONDO.     Injuries identified:   - LEFT SUPERIOR PUBIC RAMI FX (1.5CM DISPLACEMENT)  -ENDPLATE L3 FRACTURE    PLAN:   - Care per SICU  - Ortho- WBAT LLE  - Urology reqs appreciated   - Neurosurgery - TLSO brace  - Echo 30-35%; pending cardiology consult  - cefepime for pyleonephritis  - Multimodal pain control  - Physiatry: dispo to [facility]  -Q4 vitals  -Q4 ins and outs  -PT/Rehab    Trauma/ACS

## 2023-02-08 NOTE — PROGRESS NOTE ADULT - ASSESSMENT
Assessment and Plan:   · Assessment	  89y Female  w/ PMHx of dementia, hld, htn seen as  Trauma Alert s/p unwitnessed fall, ?ht, -loc. +eliquis with no complaints, no external signs of trauma . Trauma assessment in ED: ABCs intact , GCS 15 , AAOx3,  ELIZONDO.     Injuries identified:   - LEFT SUPERIOR PUBIC RAMI FX (1.5CM DISPLACEMENT)  -ENDPLATE L3 FRACTURE    PLAN:   - Care per SICU  - Ortho- WBAT LLE  - Urology reqs appreciated   - Neurosurgery - TLSO brace  - Echo 30-35%; pending cardiology consult  - cefepime for pyleonephritis  - Multimodal pain control  - Physiatry: dispo to [facility]  -Q4 vitals  -Q4 ins and outs  -PT/Rehab    Trauma/ACS

## 2023-02-08 NOTE — PROGRESS NOTE ADULT - ASSESSMENT
Imp: rehab of left pelvic and L3 comp fx, s/p fall / dementia, HTN, DM  Plan: continue bedside therapy as tolerated / limited therapy participation          adequate pain management          consider d/c to subacute rehab when medically stable

## 2023-02-08 NOTE — DIETITIAN INITIAL EVALUATION ADULT - OTHER INFO
Pt presented s/p unwitnessed fall, found to have left superior pubic rami fracture with 1.5cm displacement and endplate L3 fracture. In SICU for hypotension management.

## 2023-02-08 NOTE — DIETITIAN INITIAL EVALUATION ADULT - NSFNSGIIOFT_GEN_A_CORE
I&O's Detail    07 Feb 2023 07:01  -  08 Feb 2023 07:00  --------------------------------------------------------  IN:    Lactated Ringers: 1250 mL    Norepinephrine: 74 mL  Total IN: 1324 mL    OUT:    Indwelling Catheter - Urethral (mL): 835 mL  Total OUT: 835 mL    Total NET: 489 mL

## 2023-02-08 NOTE — PROGRESS NOTE ADULT - SUBJECTIVE AND OBJECTIVE BOX
SURGERY PROGRESS NOTE     Patient: BRIANNE DELACRUZ , 89y ([birth year])Female   MRN: [id number]  Location: [address]  Visit: 02-06-23 Inpatient  Date: 02-09-23 @ 01:03       Events of past 24 hours:  Patient seen resting comfortably in bed. No acute events overnight. Hemodynamically stable.     PAST MEDICAL & SURGICAL HISTORY:  HTN (hypertension)      DM (diabetes mellitus)      CAD (coronary artery disease)      Afib      Bladder cancer  in remission      NHL (non-Hodgkin&#x27;s lymphoma)  in remission      HLD (hyperlipidemia)      Glaucoma      Cataract      Hypothyroidism      Congestive heart failure of unknown etiology      Congestive heart failure of unknown etiology      History of total hysterectomy           Vitals:   T(F): 97.2 (02-08-23 @ 23:01), Max: 97.2 (02-08-23 @ 08:00)  HR: 116 (02-08-23 @ 20:45)  BP: 124/73 (02-08-23 @ 20:00)  RR: 25 (02-08-23 @ 20:45)  SpO2: 98% (02-08-23 @ 20:45)          Fluids:     I & O's:    02-07-23 @ 07:01  -  02-08-23 @ 07:00  --------------------------------------------------------  IN:    Lactated Ringers: 1250 mL    Norepinephrine: 74 mL  Total IN: 1324 mL    OUT:    Indwelling Catheter - Urethral (mL): 835 mL  Total OUT: 835 mL    Total NET: 489 mL           PHYSICAL EXAM:   General: NAD, calm and cooperative. AAO x3.  Cardiac: RRR.  Respiratory: On 2L NC. Speaks in complete sentences. No accessory muscles of respiration in use. Bilateral chest rise.  Abdomen: Soft, non-distended, non-tender, no rebound, no guarding.   Neuro: Decreased movement of LLE 2/2 pain. Otherwise moves all extremities.  Vascular: Pulses 2+ throughout, extremities well perfused.  Skin: Warm/dry, normal color, no jaundice.    MEDICATIONS  (STANDING):  acetaminophen     Tablet .. 650 milliGRAM(s) Oral every 6 hours  carvedilol 3.125 milliGRAM(s) Oral every 12 hours  cefepime   IVPB      cefepime   IVPB 1000 milliGRAM(s) IV Intermittent every 8 hours  chlorhexidine 2% Cloths 1 Application(s) Topical <User Schedule>  donepezil 10 milliGRAM(s) Oral at bedtime  heparin   Injectable 5000 Unit(s) SubCutaneous every 8 hours  insulin lispro (ADMELOG) corrective regimen sliding scale   SubCutaneous three times a day before meals  lactated ringers. 1000 milliLiter(s) (50 mL/Hr) IV Continuous <Continuous>  levothyroxine 25 MICROGram(s) Oral daily  memantine 5 milliGRAM(s) Oral daily  midodrine 10 milliGRAM(s) Oral every 8 hours  norepinephrine Infusion 0.05 MICROgram(s)/kG/Min (6.09 mL/Hr) IV Continuous <Continuous>  senna 2 Tablet(s) Oral at bedtime  sodium phosphate 30 milliMole(s)/500 mL IVPB 30 milliMole(s) IV Intermittent once    MEDICATIONS  (PRN):      DVT PROPHYLAXIS: heparin   Injectable 5000 Unit(s) SubCutaneous every 8 hours    GI PROPHYLAXIS:   ANTICOAGULATION:   ANTIBIOTICS:  cefepime   IVPB    cefepime   IVPB 1000 milliGRAM(s)            LAB/STUDIES:  Labs:  CAPILLARY BLOOD GLUCOSE      POCT Blood Glucose.: 138 mg/dL (08 Feb 2023 16:21)  POCT Blood Glucose.: 202 mg/dL (08 Feb 2023 11:28)  POCT Blood Glucose.: 125 mg/dL (08 Feb 2023 08:28)  POCT Blood Glucose.: 115 mg/dL (08 Feb 2023 06:58)                          10.9   9.25  )-----------( 190      ( 08 Feb 2023 20:19 )             33.4       Auto Neutrophil %: 46.2 % (02-08-23 @ 20:19)  Auto Immature Granulocyte %: 0.4 % (02-08-23 @ 20:19)    02-08    136  |  104  |  38<H>  ----------------------------<  148<H>  4.3   |  24  |  1.5      Calcium, Total Serum: 9.2 mg/dL (02-08-23 @ 20:19)      LFTs:     Lactate, Blood: 1.9 mmol/L (02-06-23 @ 20:22)  Lactate, Blood: 2.8 mmol/L (02-06-23 @ 15:27)      Coags:    CARDIAC MARKERS ( 08 Feb 2023 17:01 )  x     / <0.01 ng/mL / x     / x     / x      CARDIAC MARKERS ( 08 Feb 2023 11:30 )  x     / 0.01 ng/mL / x     / x     / x      CARDIAC MARKERS ( 08 Feb 2023 03:40 )  x     / 0.02 ng/mL / 65 U/L / x     / 2.4 ng/mL              Culture - Urine (collected 06 Feb 2023 11:11)  Source: Catheterized Catheterized  Final Report (08 Feb 2023 14:47):    >100,000 CFU/ml Escherichia coli  Organism: Escherichia coli (08 Feb 2023 14:47)  Organism: Escherichia coli (08 Feb 2023 14:47)

## 2023-02-08 NOTE — DIETITIAN INITIAL EVALUATION ADULT - PERTINENT LABORATORY DATA
11.3   8.55  )-----------( 184      ( 07 Feb 2023 21:53 )             34.7     02-07    135  |  100  |  47<H>  ----------------------------<  130<H>  4.5   |  24  |  1.6<H>    Ca    9.6      07 Feb 2023 23:21  Phos  3.3     02-07  Mg     2.1     02-07    CAPILLARY BLOOD GLUCOSE  POCT Blood Glucose.: 125 mg/dL (08 Feb 2023 08:28)    A1C with Estimated Average Glucose Result: 7.1 % (02-06-23 @ 20:22)

## 2023-02-08 NOTE — DIETITIAN INITIAL EVALUATION ADULT - ORAL NUTRITION SUPPLEMENTS
Add Ensure Plus High Protein (350kcal, 20g protein each) 1can once a day   Add Ensure Plus High Protein (350kcal, 20g protein each) 1can once a day to supplement both kcal and protein, noted pt has hx DM

## 2023-02-08 NOTE — PROGRESS NOTE ADULT - SUBJECTIVE AND OBJECTIVE BOX
SURGERY PROGRESS NOTE     Patient: BRIANNE DELACRUZ , 89y ([birth year])Female   MRN: [id number]  Location: [address]  Visit: 02-06-23 Inpatient  Date: 02-09-23 @ 01:03       Events of past 24 hours:  Patient seen resting comfortably in bed. No acute events overnight. Hemodynamically stable.     PAST MEDICAL & SURGICAL HISTORY:  HTN (hypertension)      DM (diabetes mellitus)      CAD (coronary artery disease)      Afib      Bladder cancer  in remission      NHL (non-Hodgkin&#x27;s lymphoma)  in remission      HLD (hyperlipidemia)      Glaucoma      Cataract      Hypothyroidism      Congestive heart failure of unknown etiology      Congestive heart failure of unknown etiology      History of total hysterectomy           Vitals:   T(F): 97.2 (02-08-23 @ 23:01), Max: 97.2 (02-08-23 @ 08:00)  HR: 116 (02-08-23 @ 20:45)  BP: 124/73 (02-08-23 @ 20:00)  RR: 25 (02-08-23 @ 20:45)  SpO2: 98% (02-08-23 @ 20:45)          Fluids:     I & O's:    02-07-23 @ 07:01  -  02-08-23 @ 07:00  --------------------------------------------------------  IN:    Lactated Ringers: 1250 mL    Norepinephrine: 74 mL  Total IN: 1324 mL    OUT:    Indwelling Catheter - Urethral (mL): 835 mL  Total OUT: 835 mL    Total NET: 489 mL           PHYSICAL EXAM:   General: NAD, calm and cooperative. AAO x3.  Cardiac: RRR.  Respiratory: On 2L NC. Speaks in complete sentences. No accessory muscles of respiration in use. Bilateral chest rise.  Abdomen: Soft, non-distended, non-tender, no rebound, no guarding.   Neuro: Decreased movement of LLE 2/2 pain. Otherwise moves all extremities.  Vascular: Pulses 2+ throughout, extremities well perfused.  Skin: Warm/dry, normal color, no jaundice.    MEDICATIONS  (STANDING):  acetaminophen     Tablet .. 650 milliGRAM(s) Oral every 6 hours  carvedilol 3.125 milliGRAM(s) Oral every 12 hours  cefepime   IVPB      cefepime   IVPB 1000 milliGRAM(s) IV Intermittent every 8 hours  chlorhexidine 2% Cloths 1 Application(s) Topical <User Schedule>  donepezil 10 milliGRAM(s) Oral at bedtime  heparin   Injectable 5000 Unit(s) SubCutaneous every 8 hours  insulin lispro (ADMELOG) corrective regimen sliding scale   SubCutaneous three times a day before meals  lactated ringers. 1000 milliLiter(s) (50 mL/Hr) IV Continuous <Continuous>  levothyroxine 25 MICROGram(s) Oral daily  memantine 5 milliGRAM(s) Oral daily  midodrine 10 milliGRAM(s) Oral every 8 hours  norepinephrine Infusion 0.05 MICROgram(s)/kG/Min (6.09 mL/Hr) IV Continuous <Continuous>  senna 2 Tablet(s) Oral at bedtime  sodium phosphate 30 milliMole(s)/500 mL IVPB 30 milliMole(s) IV Intermittent once    MEDICATIONS  (PRN):      DVT PROPHYLAXIS: heparin   Injectable 5000 Unit(s) SubCutaneous every 8 hours    GI PROPHYLAXIS:   ANTICOAGULATION:   ANTIBIOTICS:  cefepime   IVPB    cefepime   IVPB 1000 milliGRAM(s)            LAB/STUDIES:  Labs:  CAPILLARY BLOOD GLUCOSE      POCT Blood Glucose.: 138 mg/dL (08 Feb 2023 16:21)  POCT Blood Glucose.: 202 mg/dL (08 Feb 2023 11:28)  POCT Blood Glucose.: 125 mg/dL (08 Feb 2023 08:28)  POCT Blood Glucose.: 115 mg/dL (08 Feb 2023 06:58)                          10.9   9.25  )-----------( 190      ( 08 Feb 2023 20:19 )             33.4       Auto Neutrophil %: 46.2 % (02-08-23 @ 20:19)  Auto Immature Granulocyte %: 0.4 % (02-08-23 @ 20:19)    02-08    136  |  104  |  38<H>  ----------------------------<  148<H>  4.3   |  24  |  1.5      Calcium, Total Serum: 9.2 mg/dL (02-08-23 @ 20:19)      LFTs:     Lactate, Blood: 1.9 mmol/L (02-06-23 @ 20:22)  Lactate, Blood: 2.8 mmol/L (02-06-23 @ 15:27)      Coags:    CARDIAC MARKERS ( 08 Feb 2023 17:01 )  x     / <0.01 ng/mL / x     / x     / x      CARDIAC MARKERS ( 08 Feb 2023 11:30 )  x     / 0.01 ng/mL / x     / x     / x      CARDIAC MARKERS ( 08 Feb 2023 03:40 )  x     / 0.02 ng/mL / 65 U/L / x     / 2.4 ng/mL              Culture - Urine (collected 06 Feb 2023 11:11)  Source: Catheterized Catheterized  Final Report (08 Feb 2023 14:47):    >100,000 CFU/ml Escherichia coli  Organism: Escherichia coli (08 Feb 2023 14:47)  Organism: Escherichia coli (08 Feb 2023 14:47)

## 2023-02-08 NOTE — DIETITIAN INITIAL EVALUATION ADULT - ENERGY INTAKE
No tray intake documentation in flowsheets yesterday. Pt had 50-75% food brought in from home this AM. Pt has no GI complaints.

## 2023-02-08 NOTE — PROGRESS NOTE ADULT - SUBJECTIVE AND OBJECTIVE BOX
BRIANNE DELACRUZ     MRN-[id number]  89y ([birth year])F      Admit Date/LOS: 02-06  Indication for SDU/SICU: hypotension s/p pelvic fracture         ============================  HPI   89 yo F PMH HTN, DM, Afib on eliquis, dementia, CAD s/p PCI x3 2003, HTN, DLD, COPD on 2L NC, glaucoma/cataract, bladder cancer s/p laser 2016,  non hodgkin's lymphoma in remission last chemotherapy April 2018, vascular dementia, stage 3 CKD (baseline Cr 1.6), seen as  Trauma Alert 02/05 night s/p unwitnessed fall, ?ht, -loc, +eliquis. The patient was in her rocking chair when a family member heard a thud-the patient is unable to describe the events but a family member was at bedside stating she did not have positive head strike. The patient has no external signs of trauma and denies any pain. The patient was found to have left superior pubic rami fracture with 1.5cm displacement and endplate L3 fracture on trauma work up and admitted to the trauma service for rehab purposes. Orthopedics recommends non-op management.   SICU consulted for SDU evaluation due to patient's hypotension. Patient became hypotensive with SBP in 70s around 8am this morning; trauma team was notified and patient received 1L LR bolus.   Patient evaluated in ED by SICU team with attending bedside. Patient's BP noted to improve after administration of bolus, BP 96/52 at time of examination. Patient was A&O to person and place, and did not endorse any pain, SOB, CP, numbness, or tingling at time of examination. No external deformities of the legs noted on examination. Patient was able to move all extremities. Thigh compartments soft and compressible. After discussion with attending, patient approved for transfer to SICU for hemodynamic monitoring.        24 Hour Events  2/7  NIGHT  -CT Cysto-posterior bladder rupture, urology consult placed, black to remain in  -f/u URology consults recs  - radiology recalled, not bladder rupture, but bladder contusion  -QTC   -chest pain + anjana > EKG and stat CE    day  -CT cysto, black out afterwards   -Start diet   -Abd Binder comfort/TLSO brace   -on low dose levophed, started Midodrine 5mg q8  -Heparin dvt ppx   -Mobilize w/ PT/OT   -a1c 7.0%--> carb consistent diet  - echo pending/cards cs  -ucx- ecoli, sens pending    [X] A ten-point review of systems was otherwise negative except as noted above.  [  ] Due to altered mental status/intubation, subjective information was not attained from the patient. History was obtained, to the extent possible, from review of the chart and collateral sources of information. BRIANNE DELACRUZ     MRN-[id number]  89y ([birth year])F      Admit Date/LOS: 02-06  Indication for SDU/SICU: hypotension s/p pelvic fracture         ============================  HPI   89 yo F PMH HTN, DM, Afib on eliquis, dementia, CAD s/p PCI x3 2003, HTN, DLD, COPD on 2L NC, glaucoma/cataract, bladder cancer s/p laser 2016,  non hodgkin's lymphoma in remission last chemotherapy April 2018, vascular dementia, stage 3 CKD (baseline Cr 1.6), seen as  Trauma Alert 02/05 night s/p unwitnessed fall, ?ht, -loc, +eliquis. The patient was in her rocking chair when a family member heard a thud-the patient is unable to describe the events but a family member was at bedside stating she did not have positive head strike. The patient has no external signs of trauma and denies any pain. The patient was found to have left superior pubic rami fracture with 1.5cm displacement and endplate L3 fracture on trauma work up and admitted to the trauma service for rehab purposes. Orthopedics recommends non-op management.   SICU consulted for SDU evaluation due to patient's hypotension. Patient became hypotensive with SBP in 70s around 8am this morning; trauma team was notified and patient received 1L LR bolus.   Patient evaluated in ED by SICU team with attending bedside. Patient's BP noted to improve after administration of bolus, BP 96/52 at time of examination. Patient was A&O to person and place, and did not endorse any pain, SOB, CP, numbness, or tingling at time of examination. No external deformities of the legs noted on examination. Patient was able to move all extremities. Thigh compartments soft and compressible. After discussion with attending, patient approved for transfer to SICU for hemodynamic monitoring.        24 Hour Events  2/7  NIGHT  -CT Cysto-posterior bladder rupture, urology consult placed, black to remain in  -f/u URology consults recs  - radiology recalled, not bladder rupture, but bladder contusion  -QTC   -chest pain + anjana > EKG and stat CE  -midodrine inc to 10q8  -trop 0.02 trend, HR in 120s metoprolol 5, 2.5 pushes and coreg restarted   -chest pain somewhat improved      day  -CT cysto, black out afterwards   -Start diet   -Abd Binder comfort/TLSO brace   -on low dose levophed, started Midodrine 5mg q8  -Heparin dvt ppx   -Mobilize w/ PT/OT   -a1c 7.0%--> carb consistent diet  - echo pending/cards cs  -ucx- ecoli, sens pending    [X] A ten-point review of systems was otherwise negative except as noted above.  [  ] Due to altered mental status/intubation, subjective information was not attained from the patient. History was obtained, to the extent possible, from review of the chart and collateral sources of information.    ===========================================================  Daily         CURRENT MEDS:  Neurologic Medications  acetaminophen     Tablet .. 650 milliGRAM(s) Oral every 6 hours  donepezil 10 milliGRAM(s) Oral at bedtime  memantine 5 milliGRAM(s) Oral daily    Respiratory Medications    Cardiovascular Medications  carvedilol 3.125 milliGRAM(s) Oral every 12 hours  metoprolol tartrate Injectable 2.5 milliGRAM(s) IV Push once  midodrine 10 milliGRAM(s) Oral every 8 hours  norepinephrine Infusion 0.05 MICROgram(s)/kG/Min IV Continuous <Continuous>    Gastrointestinal Medications  lactated ringers. 1000 milliLiter(s) IV Continuous <Continuous>    Genitourinary Medications    Hematologic/Oncologic Medications  heparin   Injectable 5000 Unit(s) SubCutaneous every 8 hours    Antimicrobial/Immunologic Medications  cefepime   IVPB      cefepime   IVPB 1000 milliGRAM(s) IV Intermittent every 8 hours    Endocrine/Metabolic Medications  insulin lispro (ADMELOG) corrective regimen sliding scale   SubCutaneous three times a day before meals  levothyroxine 25 MICROGram(s) Oral daily    Topical/Other Medications  chlorhexidine 2% Cloths 1 Application(s) Topical <User Schedule>      ICU Vital Signs Last 24 Hrs  T(C): 36 (08 Feb 2023 00:00), Max: 36.1 (07 Feb 2023 20:10)  T(F): 96.8 (08 Feb 2023 00:00), Max: 96.9 (07 Feb 2023 20:10)  HR: 123 (08 Feb 2023 05:00) (52 - 128)  BP: 107/80 (08 Feb 2023 05:00) (86/50 - 144/78)  BP(mean): 90 (08 Feb 2023 05:00) (60 - 106)  ABP: 111/63 (08 Feb 2023 05:00) (71/71 - 180/90)  ABP(mean): 76 (08 Feb 2023 05:00) (51 - 113)  RR: 17 (08 Feb 2023 05:00) (10 - 23)  SpO2: 100% (08 Feb 2023 05:00) (89% - 100%)    O2 Parameters below as of 07 Feb 2023 20:10  Patient On (Oxygen Delivery Method): nasal cannula  O2 Flow (L/min): 2      I&O's Summary    06 Feb 2023 07:01  -  07 Feb 2023 07:00  --------------------------------------------------------  IN: 1114.5 mL / OUT: 555 mL / NET: 559.5 mL    07 Feb 2023 07:01  -  08 Feb 2023 05:24  --------------------------------------------------------  IN: 1141.1 mL / OUT: 680 mL / NET: 461.1 mL      I&O's Detail    06 Feb 2023 07:01  -  07 Feb 2023 07:00  --------------------------------------------------------  IN:    IV PiggyBack: 100 mL    Lactated Ringers: 250 mL    Lactated Ringers: 700 mL    Norepinephrine: 54.5 mL    Oral Fluid: 10 mL  Total IN: 1114.5 mL    OUT:    Indwelling Catheter - Urethral (mL): 455 mL    Ureteral Catheter (mL): 100 mL  Total OUT: 555 mL    Total NET: 559.5 mL      07 Feb 2023 07:01  -  08 Feb 2023 05:24  --------------------------------------------------------  IN:    Lactated Ringers: 1100 mL    Norepinephrine: 41.1 mL  Total IN: 1141.1 mL    OUT:    Indwelling Catheter - Urethral (mL): 680 mL  Total OUT: 680 mL    Total NET: 461.1 mL          PHYSICAL EXAM:     a&ox3  follows commands, ELIZONDO  no acute distress  equal chest rise b/l  abdomen soft  extremities soft  urinary cath in place

## 2023-02-08 NOTE — DIETITIAN INITIAL EVALUATION ADULT - ORAL INTAKE PTA/DIET HISTORY
Hx obtained from pt at bedside. Endorses good PO intake PTA. Denies food allergy/intolerance. Denies therapeutic diet or dietary restrictions. Denies chewing/swallowing issues. Unsure of her usual weight. Reports she is 5'3".   Weight trends in EMR (kg): 62.6 (12/26/22)   49.9 (10/13/18)

## 2023-02-08 NOTE — PROGRESS NOTE ADULT - ASSESSMENT
89 yo female s/p unwitnessed fall, ?ht, -loc, +Eliquis    INJURIES:  -LEFT superior pubic rami fx w/ 1.5cm displacement  -Inferior plate compression fracture of L3.     NEURO:  #Acute pain    -APAP    #endplate L3 fracture   -NSx consulted: no nsx intervention, abdominal binder and TLSO brace, ok for OOB with PT     -f/u TLSO brace    #hx vascular dementia     -donezepil 10 milliGRAM(s) Oral   memantine 5 milliGRAM(s) Oral     RESP:   #hx COPD (home 2L oxygen)    -maintain saturation >88%  -daily CXR  -encourage IS   #Activity    -increase as tolerated      CARDS:   #hypotension most likely secondary to hypovolemia     -on levo @0.03; midodrine added to wean levo    -NICOM --NICOM negative. SVI 20, CI 1.2, TPR 2539  #hx Afib  -carvedilol 3.125 mg q12    -Eliquis 2.5mg BID HOLDING   #hx HTN    -previous irbesartan and Imdur discontinued  #Hx CHF    -Cardiology c/s placed   -last echo in Jan EF 25-30%    -repeat pending  #hx CAD s/p PCI    GI/NUTR:   #Diet   -advanced to soft, easy to chew, carb consistent  -aspiration precautions, HOB 30  #GI Prophylaxis    -not indicated  #Bowel regimen    -not indicated    /RENAL:   #r/o posterior bladder rupture    -CT Cysto 2/7-awaiting final read, black to remain, per radiology on phone, no rupture, but +contusion    -indwelling black (placed in ED 2/6)    -#maintenance fluid  LR @50ml/hr IVL once on diet    #hx CKD (baseline Cr 1.6)  -baseline Cr 1.6   #hx bladder cancer s/p laser 2016              Labs:          BUN/Cr- 40/1.5  -->,  47/1.6  -->          Electrolytes-Na 135 // K 4.5 // Mg 2.1 //  Phos 3.3 (02-07 @ 23:21)         HEME/ONC:   #DVT prophylaxis-HSQ q8h, SCDs     #hx of Afib    -HOLDING eliquis  HOLDING home carvedilol 3.125 q 12     #non hodgkin's lymphoma in remission, last chemotherapy April 2018    Labs:  Labs: Hb/Hct:  10.9/33.8  -->,  11.3/34.7  -->                      Plts:  190  -->,  184  -->                 PTT/INR:        Home Rx: Eliquis 2.5 mg oral tablet: 1 tab(s) orally 2 times a day      ID:  #UTI present on admission    -cefepime 1g q8    -d/w team duration, current order no end date  WBC- 8.71  --->>,  8.33  --->>,  8.55  --->>  Temp trend- 24hrs T(F): 96.8 (02-08 @ 00:00), Max: 96.9 (02-07 @ 04:00)  Antibiotics-cefepime   IVPB    cefepime   IVPB 1000 every 8 hours    Cultures: (collected 02-06 @ 11:11)  Source: Catheterized Catheterized  Preliminary Report:    >100,000 CFU/ml Escherichia coli    Culture:UCx e. coli, sens pending    ENDO:    #hx DM  -A1c 7.1%  -on ISS    #hx hypothyroidism   -synthroid 25mcg PO daily     MSK:  #left superior pubic rami fracture with 1.5cm displacement   -per ortho no surgical intervention, LLE WBAT     #acute stage 2 DTI    -wound consult placed    #PT/OT consult    LINES/DRAINS:  PIV, Black (2/6), Lt radial a-line (2/6)    ADVANCED DIRECTIVES:  DNR/DNI, MOLST completed    HCP/Emergency Contact-Sarah Chaney (daughter) [phone]    -per ortho no surgical intervention, LLE Weight Bearing as Tolerated ubis, HYPOtension    DISPO:   SICU    Discussed with Dr. Bolden   89 yo female s/p unwitnessed fall, ?ht, -loc, +Eliquis    INJURIES:  -LEFT superior pubic rami fx w/ 1.5cm displacement  -Inferior plate compression fracture of L3.     NEURO:  #Acute pain    -APAP    #endplate L3 fracture   -NSx consulted: no nsx intervention, abdominal binder and TLSO brace, ok for OOB with PT     -f/u TLSO brace    #hx vascular dementia     -donezepil 10 milliGRAM(s) Oral   memantine 5 milliGRAM(s) Oral     RESP:   #hx COPD (home 2L oxygen)    -maintain saturation >88%  -daily CXR  -encourage IS   #Activity    -increase as tolerated      CARDS:   #acute chest pain and tachycardia    - trop 0.02, trend x2    - coreg restarted, metoprolol pushes given 5, 2.5 for tachycardia  #hypotension most likely secondary to hypovolemia     -on levo @0.03; midodrine added to wean levo    -NICOM --NICOM negative. SVI 20, CI 1.2, TPR 2539  #hx Afib  -carvedilol 3.125 mg q12 RESTARTED 2/8 AM    -Eliquis 2.5mg BID HOLDING   #hx HTN    -previous irbesartan and Imdur discontinued  #Hx CHF    -Cardiology c/s placed   -last echo in Jan EF 25-30%    -repeat pending  #hx CAD s/p PCI    GI/NUTR:   #Diet   -advanced to soft, easy to chew, carb consistent  -aspiration precautions, HOB 30  #GI Prophylaxis    -not indicated  #Bowel regimen    -not indicated    /RENAL:   #r/o posterior bladder rupture    -CT Cysto 2/7-awaiting final read, black to remain, per radiology on phone, no rupture, but +contusion    -indwelling black (placed in ED 2/6)    -#maintenance fluid  LR @50ml/hr IVL once on diet    #hx CKD (baseline Cr 1.6)  -baseline Cr 1.6   #hx bladder cancer s/p laser 2016              Labs:          BUN/Cr- 40/1.5  -->,  47/1.6  -->          Electrolytes-Na 135 // K 4.5 // Mg 2.1 //  Phos 3.3 (02-07 @ 23:21)         HEME/ONC:   #DVT prophylaxis-HSQ q8h, SCDs     #hx of Afib    -HOLDING eliquis  HOLDING home carvedilol 3.125 q 12     #non hodgkin's lymphoma in remission, last chemotherapy April 2018    Labs:  Labs: Hb/Hct:  10.9/33.8  -->,  11.3/34.7  -->                      Plts:  190  -->,  184  -->                 PTT/INR:        Home Rx: Eliquis 2.5 mg oral tablet: 1 tab(s) orally 2 times a day      ID:  #UTI present on admission    -cefepime 1g q8    -d/w team duration, current order no end date  WBC- 8.71  --->>,  8.33  --->>,  8.55  --->>  Temp trend- 24hrs T(F): 96.8 (02-08 @ 00:00), Max: 96.9 (02-07 @ 04:00)  Antibiotics-cefepime   IVPB    cefepime   IVPB 1000 every 8 hours    Cultures: (collected 02-06 @ 11:11)  Source: Catheterized Catheterized  Preliminary Report:    >100,000 CFU/ml Escherichia coli    Culture:UCx e. coli, sens pending    ENDO:    #hx DM  -A1c 7.1%  -on ISS    #hx hypothyroidism   -synthroid 25mcg PO daily     MSK:  #left superior pubic rami fracture with 1.5cm displacement   -per ortho no surgical intervention, LLE WBAT     #acute stage 2 DTI    -wound consult placed    #PT/OT consult    LINES/DRAINS:  PIV, Black (2/6), Lt radial a-line (2/6)    ADVANCED DIRECTIVES:  DNR/DNI, MOLST completed    HCP/Emergency Contact-Sarah Chaney (daughter) [phone]    -per ortho no surgical intervention, LLE Weight Bearing as Tolerated ubis, HYPOtension    DISPO:   SICU    Discussed with Dr. Bolden

## 2023-02-09 LAB
ANION GAP SERPL CALC-SCNC: 10 MMOL/L — SIGNIFICANT CHANGE UP (ref 7–14)
BUN SERPL-MCNC: 30 MG/DL — HIGH (ref 10–20)
CALCIUM SERPL-MCNC: 9 MG/DL — SIGNIFICANT CHANGE UP (ref 8.4–10.4)
CHLORIDE SERPL-SCNC: 102 MMOL/L — SIGNIFICANT CHANGE UP (ref 98–110)
CK MB CFR SERPL CALC: 2.6 NG/ML — SIGNIFICANT CHANGE UP (ref 0.6–6.3)
CK SERPL-CCNC: 20 U/L — SIGNIFICANT CHANGE UP (ref 0–225)
CO2 SERPL-SCNC: 20 MMOL/L — SIGNIFICANT CHANGE UP (ref 17–32)
CREAT SERPL-MCNC: 1.2 MG/DL — SIGNIFICANT CHANGE UP (ref 0.7–1.5)
EGFR: 43 ML/MIN/1.73M2 — LOW
GLUCOSE BLDC GLUCOMTR-MCNC: 114 MG/DL — HIGH (ref 70–99)
GLUCOSE BLDC GLUCOMTR-MCNC: 140 MG/DL — HIGH (ref 70–99)
GLUCOSE BLDC GLUCOMTR-MCNC: 145 MG/DL — HIGH (ref 70–99)
GLUCOSE BLDC GLUCOMTR-MCNC: 158 MG/DL — HIGH (ref 70–99)
GLUCOSE BLDC GLUCOMTR-MCNC: 177 MG/DL — HIGH (ref 70–99)
GLUCOSE SERPL-MCNC: 201 MG/DL — HIGH (ref 70–99)
HCT VFR BLD CALC: 33.1 % — LOW (ref 37–47)
HGB BLD-MCNC: 10.7 G/DL — LOW (ref 12–16)
MAGNESIUM SERPL-MCNC: 1.9 MG/DL — SIGNIFICANT CHANGE UP (ref 1.8–2.4)
MCHC RBC-ENTMCNC: 26.3 PG — LOW (ref 27–31)
MCHC RBC-ENTMCNC: 32.3 G/DL — SIGNIFICANT CHANGE UP (ref 32–37)
MCV RBC AUTO: 81.3 FL — SIGNIFICANT CHANGE UP (ref 81–99)
NRBC # BLD: 0 /100 WBCS — SIGNIFICANT CHANGE UP (ref 0–0)
PHOSPHATE SERPL-MCNC: 3.3 MG/DL — SIGNIFICANT CHANGE UP (ref 2.1–4.9)
PLATELET # BLD AUTO: 186 K/UL — SIGNIFICANT CHANGE UP (ref 130–400)
POTASSIUM SERPL-MCNC: 4.7 MMOL/L — SIGNIFICANT CHANGE UP (ref 3.5–5)
POTASSIUM SERPL-SCNC: 4.7 MMOL/L — SIGNIFICANT CHANGE UP (ref 3.5–5)
RBC # BLD: 4.07 M/UL — LOW (ref 4.2–5.4)
RBC # FLD: 20.6 % — HIGH (ref 11.5–14.5)
SODIUM SERPL-SCNC: 132 MMOL/L — LOW (ref 135–146)
TROPONIN T SERPL-MCNC: <0.01 NG/ML — SIGNIFICANT CHANGE UP
WBC # BLD: 9.63 K/UL — SIGNIFICANT CHANGE UP (ref 4.8–10.8)
WBC # FLD AUTO: 9.63 K/UL — SIGNIFICANT CHANGE UP (ref 4.8–10.8)

## 2023-02-09 PROCEDURE — 99291 CRITICAL CARE FIRST HOUR: CPT

## 2023-02-09 RX ORDER — APIXABAN 2.5 MG/1
2.5 TABLET, FILM COATED ORAL EVERY 12 HOURS
Refills: 0 | Status: DISCONTINUED | OUTPATIENT
Start: 2023-02-09 | End: 2023-02-14

## 2023-02-09 RX ORDER — MAGNESIUM SULFATE 500 MG/ML
1 VIAL (ML) INJECTION ONCE
Refills: 0 | Status: COMPLETED | OUTPATIENT
Start: 2023-02-09 | End: 2023-02-09

## 2023-02-09 RX ORDER — NOREPINEPHRINE BITARTRATE/D5W 8 MG/250ML
0.05 PLASTIC BAG, INJECTION (ML) INTRAVENOUS
Qty: 8 | Refills: 0 | Status: DISCONTINUED | OUTPATIENT
Start: 2023-02-09 | End: 2023-02-09

## 2023-02-09 RX ORDER — NOREPINEPHRINE BITARTRATE/D5W 8 MG/250ML
0.05 PLASTIC BAG, INJECTION (ML) INTRAVENOUS
Qty: 8 | Refills: 0 | Status: DISCONTINUED | OUTPATIENT
Start: 2023-02-09 | End: 2023-02-10

## 2023-02-09 RX ADMIN — Medication 85 MILLIMOLE(S): at 01:57

## 2023-02-09 RX ADMIN — MIDODRINE HYDROCHLORIDE 10 MILLIGRAM(S): 2.5 TABLET ORAL at 22:52

## 2023-02-09 RX ADMIN — Medication 650 MILLIGRAM(S): at 05:40

## 2023-02-09 RX ADMIN — CHLORHEXIDINE GLUCONATE 1 APPLICATION(S): 213 SOLUTION TOPICAL at 05:41

## 2023-02-09 RX ADMIN — CEFEPIME 100 MILLIGRAM(S): 1 INJECTION, POWDER, FOR SOLUTION INTRAMUSCULAR; INTRAVENOUS at 22:52

## 2023-02-09 RX ADMIN — CARVEDILOL PHOSPHATE 3.12 MILLIGRAM(S): 80 CAPSULE, EXTENDED RELEASE ORAL at 17:55

## 2023-02-09 RX ADMIN — Medication 650 MILLIGRAM(S): at 17:55

## 2023-02-09 RX ADMIN — Medication 650 MILLIGRAM(S): at 12:49

## 2023-02-09 RX ADMIN — CHLORHEXIDINE GLUCONATE 1 APPLICATION(S): 213 SOLUTION TOPICAL at 05:40

## 2023-02-09 RX ADMIN — MEMANTINE HYDROCHLORIDE 5 MILLIGRAM(S): 10 TABLET ORAL at 12:55

## 2023-02-09 RX ADMIN — CARVEDILOL PHOSPHATE 3.12 MILLIGRAM(S): 80 CAPSULE, EXTENDED RELEASE ORAL at 05:40

## 2023-02-09 RX ADMIN — Medication 25 MICROGRAM(S): at 05:41

## 2023-02-09 RX ADMIN — Medication 2: at 05:02

## 2023-02-09 RX ADMIN — Medication 100 GRAM(S): at 23:12

## 2023-02-09 RX ADMIN — SENNA PLUS 2 TABLET(S): 8.6 TABLET ORAL at 22:52

## 2023-02-09 RX ADMIN — DONEPEZIL HYDROCHLORIDE 10 MILLIGRAM(S): 10 TABLET, FILM COATED ORAL at 22:52

## 2023-02-09 RX ADMIN — Medication 650 MILLIGRAM(S): at 18:55

## 2023-02-09 RX ADMIN — HEPARIN SODIUM 5000 UNIT(S): 5000 INJECTION INTRAVENOUS; SUBCUTANEOUS at 05:40

## 2023-02-09 RX ADMIN — Medication 650 MILLIGRAM(S): at 13:50

## 2023-02-09 RX ADMIN — CEFEPIME 100 MILLIGRAM(S): 1 INJECTION, POWDER, FOR SOLUTION INTRAMUSCULAR; INTRAVENOUS at 05:40

## 2023-02-09 RX ADMIN — MIDODRINE HYDROCHLORIDE 10 MILLIGRAM(S): 2.5 TABLET ORAL at 05:41

## 2023-02-09 RX ADMIN — MIDODRINE HYDROCHLORIDE 10 MILLIGRAM(S): 2.5 TABLET ORAL at 14:32

## 2023-02-09 RX ADMIN — CEFEPIME 100 MILLIGRAM(S): 1 INJECTION, POWDER, FOR SOLUTION INTRAMUSCULAR; INTRAVENOUS at 14:32

## 2023-02-09 RX ADMIN — APIXABAN 2.5 MILLIGRAM(S): 2.5 TABLET, FILM COATED ORAL at 17:55

## 2023-02-09 NOTE — PROGRESS NOTE ADULT - ASSESSMENT
89y Female  w/ PMHx of dementia, hld, htn seen as  Trauma Alert s/p unwitnessed fall, ?ht, -loc. +eliquis with no complaints, no external signs of trauma . Trauma assessment in ED: ABCs intact , GCS 15 , AAOx3,  ELIZONDO.     Injuries identified:   - LEFT SUPERIOR PUBIC RAMI FX (1.5CM DISPLACEMENT)  -ENDPLATE L3 FRACTURE    PLAN:   - Care per SICU  - Ortho- WBAT LLE  - Urology reqs appreciated   - Neurosurgery - TLSO brace  - Echo 30-35%; pending cardiology consult  - cefepime for pyleonephritis  - Multimodal pain control  - Physiatry: dispo to [facility]  -Q4 vitals  -Q4 ins and outs  -PT/Rehab    Trauma/ACS  Spectra [phone]

## 2023-02-09 NOTE — PROGRESS NOTE ADULT - SUBJECTIVE AND OBJECTIVE BOX
BRIANNE DELACRUZ     MRN-[id number]  89y ([birth year])F    Admit Date/LOS: 02-06  Indication for SDU/SICU: hypotension s/p pelvic fracture         ============================  HPI   89 yo F PMH HTN, DM, Afib on eliquis, dementia, CAD s/p PCI x3 2003, HTN, DLD, COPD on 2L NC, glaucoma/cataract, bladder cancer s/p laser 2016,  non hodgkin's lymphoma in remission last chemotherapy April 2018, vascular dementia, stage 3 CKD (baseline Cr 1.6), seen as  Trauma Alert 02/05 night s/p unwitnessed fall, ?ht, -loc, +eliquis. The patient was in her rocking chair when a family member heard a thud-the patient is unable to describe the events but a family member was at bedside stating she did not have positive head strike. The patient has no external signs of trauma and denies any pain. The patient was found to have left superior pubic rami fracture with 1.5cm displacement and endplate L3 fracture on trauma work up and admitted to the trauma service for rehab purposes. Orthopedics recommends non-op management.   SICU consulted for SDU evaluation due to patient's hypotension. Patient became hypotensive with SBP in 70s around 8am this morning; trauma team was notified and patient received 1L LR bolus.   Patient evaluated in ED by SICU team with attending bedside. Patient's BP noted to improve after administration of bolus, BP 96/52 at time of examination. Patient was A&O to person and place, and did not endorse any pain, SOB, CP, numbness, or tingling at time of examination. No external deformities of the legs noted on examination. Patient was able to move all extremities. Thigh compartments soft and compressible. After discussion with attending, patient approved for transfer to SICU for hemodynamic monitoring.        24 Hour Events  2/8  NIGHT  -phos repleted  -HR improved  -follow up restarting home AC  -levo discontinued, now back to 0.03  -f/u Dr. Salazar if still want consult (placed 2/6 hasn't seen)    DAY  -recieved 4 doses of cefepime, continue until after 10pm dose 2/9  -trop neg x3  -senna added  -weaning levo   -Urology recs: Repeat CT cystogram on day of discharge (bladder underdistended on initial imaging) to determine whether black can be removed      [X] A ten-point review of systems was otherwise negative except as noted above.  [  ] Due to altered mental status/intubation, subjective information was not attained from the patient. History was obtained, to the extent possible, from review of the chart and collateral sources of information.  ====================================================================  Daily Height in cm: 160.02 (08 Feb 2023 10:13)    Daily         CURRENT MEDS:  Neurologic Medications  acetaminophen     Tablet .. 650 milliGRAM(s) Oral every 6 hours  donepezil 10 milliGRAM(s) Oral at bedtime  memantine 5 milliGRAM(s) Oral daily    Respiratory Medications    Cardiovascular Medications  carvedilol 3.125 milliGRAM(s) Oral every 12 hours  midodrine 10 milliGRAM(s) Oral every 8 hours  norepinephrine Infusion 0.05 MICROgram(s)/kG/Min IV Continuous <Continuous>    Gastrointestinal Medications  lactated ringers. 1000 milliLiter(s) IV Continuous <Continuous>  senna 2 Tablet(s) Oral at bedtime    Genitourinary Medications    Hematologic/Oncologic Medications  heparin   Injectable 5000 Unit(s) SubCutaneous every 8 hours    Antimicrobial/Immunologic Medications  cefepime   IVPB      cefepime   IVPB 1000 milliGRAM(s) IV Intermittent every 8 hours    Endocrine/Metabolic Medications  insulin lispro (ADMELOG) corrective regimen sliding scale   SubCutaneous three times a day before meals  levothyroxine 25 MICROGram(s) Oral daily    Topical/Other Medications  chlorhexidine 2% Cloths 1 Application(s) Topical <User Schedule>      ICU Vital Signs Last 24 Hrs  T(C): 36.2 (08 Feb 2023 23:01), Max: 36.2 (08 Feb 2023 08:00)  T(F): 97.2 (08 Feb 2023 23:01), Max: 97.2 (08 Feb 2023 08:00)  HR: 116 (08 Feb 2023 20:45) (70 - 127)  BP: 124/73 (08 Feb 2023 20:00) (91/66 - 141/78)  BP(mean): 94 (08 Feb 2023 20:00) (66 - 102)  ABP: 114/85 (08 Feb 2023 20:45) (31/10 - 122/70)  ABP(mean): 96 (08 Feb 2023 20:45) (13 - 98)  RR: 25 (08 Feb 2023 20:45) (14 - 40)  SpO2: 98% (08 Feb 2023 20:45) (93% - 100%)    O2 Parameters below as of 08 Feb 2023 20:45  Patient On (Oxygen Delivery Method): nasal cannula  O2 Flow (L/min): 2          Adult Advanced Hemodynamics Last 24 Hrs  CVP(mm Hg): --  CVP(cm H2O): --  CO: --  CI: --  PA: --  PA(mean): --  PCWP: --  SVR: --  SVRI: --  PVR: --  PVRI: --          I&O's Summary    07 Feb 2023 07:01  -  08 Feb 2023 07:00  --------------------------------------------------------  IN: 1324 mL / OUT: 835 mL / NET: 489 mL    08 Feb 2023 07:01  -  09 Feb 2023 05:10  --------------------------------------------------------  IN: 1270.6 mL / OUT: 1005 mL / NET: 265.6 mL      I&O's Detail    07 Feb 2023 07:01  -  08 Feb 2023 07:00  --------------------------------------------------------  IN:    Lactated Ringers: 1250 mL    Norepinephrine: 74 mL  Total IN: 1324 mL    OUT:    Indwelling Catheter - Urethral (mL): 835 mL  Total OUT: 835 mL    Total NET: 489 mL      08 Feb 2023 07:01  -  09 Feb 2023 05:10  --------------------------------------------------------  IN:    IV PiggyBack: 50 mL    Lactated Ringers: 600 mL    Norepinephrine: 120.6 mL    Oral Fluid: 500 mL  Total IN: 1270.6 mL    OUT:    Indwelling Catheter - Urethral (mL): 1005 mL  Total OUT: 1005 mL    Total NET: 265.6 mL    PHYSICAL EXAM:   General: NAD, calm and cooperative  Cardiac: RRR.  Respiratory: On 2L NC. Speaks in complete sentences. No accessory muscles of respiration in use. Bilateral chest rise.  Abdomen: Soft, non-distended, non-tender, no rebound, no guarding.   Neuro: Decreased movement of LLE 2/2 pain. Otherwise moves all extremities.  Skin: Warm/dry, normal color, no jaundice.

## 2023-02-09 NOTE — PROGRESS NOTE ADULT - SUBJECTIVE AND OBJECTIVE BOX
SURGERY PROGRESS NOTE     Patient: BRIANNE DELACRUZ , 89y ([birth year])Female   MRN: [id number]  Location: [address]  Visit: 02-06-23 Inpatient  Date: 02-09-23 @ 00:59       Events of past 24 hours:    Patient seen resting comfortably in bed.   No off levo. Hemodynamically stable  Urology recs: Repeat CT cystogram on day of discharge (bladder underdistended on initial imaging) to determine whether black can be removed      PAST MEDICAL & SURGICAL HISTORY:  HTN (hypertension)      DM (diabetes mellitus)      CAD (coronary artery disease)      Afib      Bladder cancer  in remission      NHL (non-Hodgkin&#x27;s lymphoma)  in remission      HLD (hyperlipidemia)      Glaucoma      Cataract      Hypothyroidism      Congestive heart failure of unknown etiology      Congestive heart failure of unknown etiology      History of total hysterectomy           Vitals:   T(F): 97.2 (02-08-23 @ 23:01), Max: 97.2 (02-08-23 @ 08:00)  HR: 116 (02-08-23 @ 20:45)  BP: 124/73 (02-08-23 @ 20:00)  RR: 25 (02-08-23 @ 20:45)  SpO2: 98% (02-08-23 @ 20:45)          Fluids:     I & O's:    02-07-23 @ 07:01  -  02-08-23 @ 07:00  --------------------------------------------------------  IN:    Lactated Ringers: 1250 mL    Norepinephrine: 74 mL  Total IN: 1324 mL    OUT:    Indwelling Catheter - Urethral (mL): 835 mL  Total OUT: 835 mL    Total NET: 489 mL           PHYSICAL EXAM:   General: NAD, calm and cooperative. AAO x3.  Cardiac: RRR.  Respiratory: On 2L NC. Speaks in complete sentences. No accessory muscles of respiration in use. Bilateral chest rise.  Abdomen: Soft, non-distended, non-tender, no rebound, no guarding.   Neuro: Decreased movement of LLE 2/2 pain. Otherwise moves all extremities.  Vascular: Pulses 2+ throughout, extremities well perfused.  Skin: Warm/dry, normal color, no jaundice.      MEDICATIONS  (STANDING):  acetaminophen     Tablet .. 650 milliGRAM(s) Oral every 6 hours  carvedilol 3.125 milliGRAM(s) Oral every 12 hours  cefepime   IVPB      cefepime   IVPB 1000 milliGRAM(s) IV Intermittent every 8 hours  chlorhexidine 2% Cloths 1 Application(s) Topical <User Schedule>  donepezil 10 milliGRAM(s) Oral at bedtime  heparin   Injectable 5000 Unit(s) SubCutaneous every 8 hours  insulin lispro (ADMELOG) corrective regimen sliding scale   SubCutaneous three times a day before meals  lactated ringers. 1000 milliLiter(s) (50 mL/Hr) IV Continuous <Continuous>  levothyroxine 25 MICROGram(s) Oral daily  memantine 5 milliGRAM(s) Oral daily  midodrine 10 milliGRAM(s) Oral every 8 hours  norepinephrine Infusion 0.05 MICROgram(s)/kG/Min (6.09 mL/Hr) IV Continuous <Continuous>  senna 2 Tablet(s) Oral at bedtime  sodium phosphate 30 milliMole(s)/500 mL IVPB 30 milliMole(s) IV Intermittent once    MEDICATIONS  (PRN):      DVT PROPHYLAXIS: heparin   Injectable 5000 Unit(s) SubCutaneous every 8 hours    GI PROPHYLAXIS:   ANTICOAGULATION:   ANTIBIOTICS:  cefepime   IVPB    cefepime   IVPB 1000 milliGRAM(s)            LAB/STUDIES:  Labs:  CAPILLARY BLOOD GLUCOSE      POCT Blood Glucose.: 138 mg/dL (08 Feb 2023 16:21)  POCT Blood Glucose.: 202 mg/dL (08 Feb 2023 11:28)  POCT Blood Glucose.: 125 mg/dL (08 Feb 2023 08:28)  POCT Blood Glucose.: 115 mg/dL (08 Feb 2023 06:58)                          10.9   9.25  )-----------( 190      ( 08 Feb 2023 20:19 )             33.4       Auto Neutrophil %: 46.2 % (02-08-23 @ 20:19)  Auto Immature Granulocyte %: 0.4 % (02-08-23 @ 20:19)    02-08    136  |  104  |  38<H>  ----------------------------<  148<H>  4.3   |  24  |  1.5      Calcium, Total Serum: 9.2 mg/dL (02-08-23 @ 20:19)      LFTs:     Lactate, Blood: 1.9 mmol/L (02-06-23 @ 20:22)  Lactate, Blood: 2.8 mmol/L (02-06-23 @ 15:27)      Coags:    CARDIAC MARKERS ( 08 Feb 2023 17:01 )  x     / <0.01 ng/mL / x     / x     / x      CARDIAC MARKERS ( 08 Feb 2023 11:30 )  x     / 0.01 ng/mL / x     / x     / x      CARDIAC MARKERS ( 08 Feb 2023 03:40 )  x     / 0.02 ng/mL / 65 U/L / x     / 2.4 ng/mL              Culture - Urine (collected 06 Feb 2023 11:11)  Source: Catheterized Catheterized  Final Report (08 Feb 2023 14:47):    >100,000 CFU/ml Escherichia coli  Organism: Escherichia coli (08 Feb 2023 14:47)  Organism: Escherichia coli (08 Feb 2023 14:47)

## 2023-02-09 NOTE — PROGRESS NOTE ADULT - ASSESSMENT
89 yo female s/p unwitnessed fall, ?ht, -loc, +Eliquis    INJURIES:  -LEFT superior pubic rami fx w/ 1.5cm displacement  -Inferior plate compression fracture of L3.     NEURO:  #Acute pain    -APAP    #endplate L3 fracture   -NSx consulted: no nsx intervention, abdominal binder and TLSO brace, ok for OOB with PT     -f/u TLSO brace    #hx vascular dementia     -donezepil 10 milliGRAM(s) Oral     -memantine 5 milliGRAM(s) Oral     RESP:   #hx COPD (home 2L oxygen)    -96-99% on 2L NC, maintain saturation >88%    -500 on IS   #Activity    -increase as tolerated      CARDS:   #hypotension most likely secondary to hypovolemia     -on levo @0.03; midodrine increased to wean levo    -NICOM --NICOM negative. SVI 20, CI 1.2, TPR 2539  #hx Afib  -carvedilol 3.125 mg q12    -Eliquis 2.5mg BID HOLDING, consider restarting now that hematuria resolved (hgb 10.9<11.3)  #hx HTN    -previous irbesartan and Imdur discontinued  #Hx CHF    -Cardiology c/s placed 2/6, Dr. Salazar    -last echo in Jan EF 25-30%    -Echo 2/8: EF 30-35%, severely enlarged RV with moderately reduced systolic function, septal flattening consistent with right ventricular pressure overload, mild to mod TR  #hx CAD s/p PCI    GI/NUTR:   #Diet     -advanced to soft, easy to chew, carb consistent    -aspiration precautions, HOB 30  #GI Prophylaxis    -not indicated  #Bowel regimen    -not indicated    /RENAL:   #bladder contusion     -CT Cysto 2/7- black to remain, per radiology on phone, no rupture, but +contusion    -Urology recs: Repeat CT cystogram on day of discharge (bladder underdistended on initial imaging) to determine whether black can be removed    -indwelling black (placed in ED 2/6)  #MIVF    -LR @50ml/hr  #hx CKD (baseline Cr 1.6)  -baseline Cr 1.6   #hx bladder cancer s/p laser 2016      Labs:          BUN/Cr- 47/1.6  -->,  38/1.5  -->          Electrolytes-Na 136 // K 4.3 // Mg 2.0 //  Phos 2.4 (02-08 @ 20:19)         HEME/ONC:   #DVT prophylaxis-HSQ q8h, SCDs  #hx of Afib  - HOLDING eliquis, consider restarting prior to DC    #non hodgkin's lymphoma in remission, last chemotherapy April 2018    Labs: Hb/Hct:  11.3/34.7  -->,  10.9/33.4  -->                      Plts:  190  -->,  184  -->                 PTT/INR:                  T&S: (02-06)    Home Rx: Eliquis 2.5 mg oral tablet: 1 tab(s) orally 2 times a day    ID:  #UTI present on admission    -cefepime 1g q8    -d/w team duration, current order no end date  WBC- 8.33  --->>,  8.55  --->>,  9.25  --->>  Temp trend- 24hrs T(F): 97.2 (02-08 @ 23:01), Max: 97.2 (02-08 @ 08:00)  Antibiotics-cefepime   IVPB    cefepime   IVPB 1000 every 8 hours    Cultures: (collected 02-06 @ 11:11)  Source: Catheterized Catheterized  Final Report:    >100,000 CFU/ml Escherichia coli  Organism: Escherichia coli    Culture:UCx e. coli, sens pending    ENDO:    #hx DM  -A1c 7.1%  -on ISS    #hx hypothyroidism   -synthroid 25mcg PO daily     MSK:  #left superior pubic rami fracture with 1.5cm displacement   -per ortho no surgical intervention, LLE WBAT     #acute stage 2 DTI    -wound consult placed, f/u?    #PT/OT consult  #physiatry: consider d/c to subacute rehab when medically stable     LINES/DRAINS:  PIV, Black (2/6), Lt radial a-line (2/6)    ADVANCED DIRECTIVES:  DNR/DNI, MOLST completed    HCP/Emergency Contact-Sarah Chaney (daughter) [phone]    -per ortho no surgical intervention, LLE Weight Bearing as Tolerated ubis, HYPOtension    DISPO:   SICU d/w dr. alarcon

## 2023-02-10 LAB
GLUCOSE BLDC GLUCOMTR-MCNC: 127 MG/DL — HIGH (ref 70–99)
GLUCOSE BLDC GLUCOMTR-MCNC: 136 MG/DL — HIGH (ref 70–99)
GLUCOSE BLDC GLUCOMTR-MCNC: 147 MG/DL — HIGH (ref 70–99)

## 2023-02-10 PROCEDURE — 99291 CRITICAL CARE FIRST HOUR: CPT

## 2023-02-10 PROCEDURE — 71045 X-RAY EXAM CHEST 1 VIEW: CPT | Mod: 26

## 2023-02-10 PROCEDURE — 93010 ELECTROCARDIOGRAM REPORT: CPT

## 2023-02-10 RX ORDER — METOPROLOL TARTRATE 50 MG
12.5 TABLET ORAL EVERY 12 HOURS
Refills: 0 | Status: DISCONTINUED | OUTPATIENT
Start: 2023-02-10 | End: 2023-02-14

## 2023-02-10 RX ORDER — TRAMADOL HYDROCHLORIDE 50 MG/1
25 TABLET ORAL EVERY 8 HOURS
Refills: 0 | Status: DISCONTINUED | OUTPATIENT
Start: 2023-02-10 | End: 2023-02-14

## 2023-02-10 RX ORDER — GABAPENTIN 400 MG/1
100 CAPSULE ORAL EVERY 8 HOURS
Refills: 0 | Status: DISCONTINUED | OUTPATIENT
Start: 2023-02-10 | End: 2023-02-14

## 2023-02-10 RX ADMIN — Medication 650 MILLIGRAM(S): at 11:06

## 2023-02-10 RX ADMIN — TRAMADOL HYDROCHLORIDE 25 MILLIGRAM(S): 50 TABLET ORAL at 21:09

## 2023-02-10 RX ADMIN — CARVEDILOL PHOSPHATE 3.12 MILLIGRAM(S): 80 CAPSULE, EXTENDED RELEASE ORAL at 17:48

## 2023-02-10 RX ADMIN — MIDODRINE HYDROCHLORIDE 10 MILLIGRAM(S): 2.5 TABLET ORAL at 14:00

## 2023-02-10 RX ADMIN — Medication 650 MILLIGRAM(S): at 18:55

## 2023-02-10 RX ADMIN — MIDODRINE HYDROCHLORIDE 10 MILLIGRAM(S): 2.5 TABLET ORAL at 21:10

## 2023-02-10 RX ADMIN — Medication 650 MILLIGRAM(S): at 05:53

## 2023-02-10 RX ADMIN — CHLORHEXIDINE GLUCONATE 1 APPLICATION(S): 213 SOLUTION TOPICAL at 06:01

## 2023-02-10 RX ADMIN — Medication 650 MILLIGRAM(S): at 06:31

## 2023-02-10 RX ADMIN — SENNA PLUS 2 TABLET(S): 8.6 TABLET ORAL at 21:10

## 2023-02-10 RX ADMIN — Medication 25 MICROGRAM(S): at 05:53

## 2023-02-10 RX ADMIN — GABAPENTIN 100 MILLIGRAM(S): 400 CAPSULE ORAL at 14:00

## 2023-02-10 RX ADMIN — APIXABAN 2.5 MILLIGRAM(S): 2.5 TABLET, FILM COATED ORAL at 17:48

## 2023-02-10 RX ADMIN — GABAPENTIN 100 MILLIGRAM(S): 400 CAPSULE ORAL at 21:09

## 2023-02-10 RX ADMIN — Medication 650 MILLIGRAM(S): at 12:20

## 2023-02-10 RX ADMIN — DONEPEZIL HYDROCHLORIDE 10 MILLIGRAM(S): 10 TABLET, FILM COATED ORAL at 21:10

## 2023-02-10 RX ADMIN — Medication 650 MILLIGRAM(S): at 17:49

## 2023-02-10 RX ADMIN — Medication 650 MILLIGRAM(S): at 23:33

## 2023-02-10 RX ADMIN — MIDODRINE HYDROCHLORIDE 10 MILLIGRAM(S): 2.5 TABLET ORAL at 05:53

## 2023-02-10 RX ADMIN — APIXABAN 2.5 MILLIGRAM(S): 2.5 TABLET, FILM COATED ORAL at 05:53

## 2023-02-10 RX ADMIN — MEMANTINE HYDROCHLORIDE 5 MILLIGRAM(S): 10 TABLET ORAL at 11:06

## 2023-02-10 RX ADMIN — GABAPENTIN 100 MILLIGRAM(S): 400 CAPSULE ORAL at 11:06

## 2023-02-10 NOTE — PROGRESS NOTE ADULT - SUBJECTIVE AND OBJECTIVE BOX
BRIANNE DELACRUZ     MRN-[id number]  89y ([birth year])F    Admit Date/LOS: 02-06  Indication for SDU/SICU: hypotension s/p pelvic fracture         ============================  HPI   89 yo F PMH HTN, DM, Afib on eliquis, dementia, CAD s/p PCI x3 2003, HTN, DLD, COPD on 2L NC, glaucoma/cataract, bladder cancer s/p laser 2016,  non hodgkin's lymphoma in remission last chemotherapy April 2018, vascular dementia, stage 3 CKD (baseline Cr 1.6), seen as  Trauma Alert 02/05 night s/p unwitnessed fall, ?ht, -loc, +eliquis. The patient was in her rocking chair when a family member heard a thud-the patient is unable to describe the events but a family member was at bedside stating she did not have positive head strike. The patient has no external signs of trauma and denies any pain. The patient was found to have left superior pubic rami fracture with 1.5cm displacement and endplate L3 fracture on trauma work up and admitted to the trauma service for rehab purposes. Orthopedics recommends non-op management.   SICU consulted for SDU evaluation due to patient's hypotension. Patient became hypotensive with SBP in 70s around 8am this morning; trauma team was notified and patient received 1L LR bolus.   Patient evaluated in ED by SICU team with attending bedside. Patient's BP noted to improve after administration of bolus, BP 96/52 at time of examination. Patient was A&O to person and place, and did not endorse any pain, SOB, CP, numbness, or tingling at time of examination. No external deformities of the legs noted on examination. Patient was able to move all extremities. Thigh compartments soft and compressible. After discussion with attending, patient approved for transfer to SICU for hemodynamic monitoring.        24 Hour Events  2/9  DAY  -keep cardio consult  -stop levo  -restart eliquis  -PT for ambulation  -5pm- patient off levo   -PT reordered to get patient OOB    NIGHT  - 4 beats of vtach, formal ekg ordred  - stat cardiac enzymes- negative  - mg repleted  - Cefepime DCed  - Levo restarted at 8PM and off again        [X] A ten-point review of systems was otherwise negative except as noted above.  [  ] Due to altered mental status/intubation, subjective information was not attained from the patient. History was obtained, to the extent possible, from review of the chart and collateral sources of information.  ================================================================   BRIANNE DELACRUZ     MRN-[id number]  89y ([birth year])F    Admit Date/LOS: 02-06  Indication for SDU/SICU: hypotension s/p pelvic fracture         ============================  HPI   89 yo F PMH HTN, DM, Afib on eliquis, dementia, CAD s/p PCI x3 2003, HTN, DLD, COPD on 2L NC, glaucoma/cataract, bladder cancer s/p laser 2016,  non hodgkin's lymphoma in remission last chemotherapy April 2018, vascular dementia, stage 3 CKD (baseline Cr 1.6), seen as  Trauma Alert 02/05 night s/p unwitnessed fall, ?ht, -loc, +eliquis. The patient was in her rocking chair when a family member heard a thud-the patient is unable to describe the events but a family member was at bedside stating she did not have positive head strike. The patient has no external signs of trauma and denies any pain. The patient was found to have left superior pubic rami fracture with 1.5cm displacement and endplate L3 fracture on trauma work up and admitted to the trauma service for rehab purposes. Orthopedics recommends non-op management.   SICU consulted for SDU evaluation due to patient's hypotension. Patient became hypotensive with SBP in 70s around 8am this morning; trauma team was notified and patient received 1L LR bolus.   Patient evaluated in ED by SICU team with attending bedside. Patient's BP noted to improve after administration of bolus, BP 96/52 at time of examination. Patient was A&O to person and place, and did not endorse any pain, SOB, CP, numbness, or tingling at time of examination. No external deformities of the legs noted on examination. Patient was able to move all extremities. Thigh compartments soft and compressible. After discussion with attending, patient approved for transfer to SICU for hemodynamic monitoring.        24 Hour Events  2/9  DAY  -keep cardio consult  -stop levo  -restart eliquis  -PT for ambulation  -5pm- patient off levo   -PT reordered to get patient OOB    NIGHT  - 4 beats of vtach, formal ekg ordred  - stat cardiac enzymes- negative  - mg repleted  - Cefepime DCed  - Levo restarted at 8PM and off again        [X] A ten-point review of systems was otherwise negative except as noted above.  [  ] Due to altered mental status/intubation, subjective information was not attained from the patient. History was obtained, to the extent possible, from review of the chart and collateral sources of information.  ================================================================      CURRENT MEDS:  Neurologic Medications  acetaminophen     Tablet .. 650 milliGRAM(s) Oral every 6 hours  donepezil 10 milliGRAM(s) Oral at bedtime  gabapentin 100 milliGRAM(s) Oral every 8 hours  memantine 5 milliGRAM(s) Oral daily  traMADol 25 milliGRAM(s) Oral every 8 hours PRN Moderate Pain (4 - 6)    Respiratory Medications    Cardiovascular Medications  carvedilol 3.125 milliGRAM(s) Oral every 12 hours  midodrine 10 milliGRAM(s) Oral every 8 hours    Gastrointestinal Medications  senna 2 Tablet(s) Oral at bedtime    Genitourinary Medications    Hematologic/Oncologic Medications  apixaban 2.5 milliGRAM(s) Oral every 12 hours    Antimicrobial/Immunologic Medications    Endocrine/Metabolic Medications  insulin lispro (ADMELOG) corrective regimen sliding scale   SubCutaneous three times a day before meals  levothyroxine 25 MICROGram(s) Oral daily    Topical/Other Medications  chlorhexidine 2% Cloths 1 Application(s) Topical <User Schedule>      ICU Vital Signs Last 24 Hrs  T(C): 35.9 (10 Feb 2023 07:26), Max: 36.1 (10 Feb 2023 04:00)  T(F): 96.7 (10 Feb 2023 07:26), Max: 96.9 (10 Feb 2023 04:00)  HR: 93 (10 Feb 2023 11:00) (66 - 110)  BP: 108/63 (10 Feb 2023 11:00) (94/53 - 145/83)  BP(mean): 80 (10 Feb 2023 11:00) (68 - 105)  ABP: 201/164 (10 Feb 2023 10:00) (75/52 - 284/284)  ABP(mean): 193 (10 Feb 2023 10:00) (62 - 284)  RR: 25 (10 Feb 2023 11:00) (15 - 45)  SpO2: 97% (10 Feb 2023 11:00) (92% - 100%)    O2 Parameters below as of 10 Feb 2023 11:00  Patient On (Oxygen Delivery Method): nasal cannula  O2 Flow (L/min): 1          Adult Advanced Hemodynamics Last 24 Hrs  CVP(mm Hg): --  CVP(cm H2O): --  CO: --  CI: --  PA: --  PA(mean): --  PCWP: --  SVR: --  SVRI: --  PVR: --  PVRI: --          I&O's Summary    09 Feb 2023 07:01  -  10 Feb 2023 07:00  --------------------------------------------------------  IN: 2597.3 mL / OUT: 1215 mL / NET: 1382.3 mL    10 Feb 2023 07:01  -  10 Feb 2023 11:26  --------------------------------------------------------  IN: 720 mL / OUT: 330 mL / NET: 390 mL      I&O's Detail    09 Feb 2023 07:01  -  10 Feb 2023 07:00  --------------------------------------------------------  IN:    IV PiggyBack: 100 mL    IV PiggyBack: 100 mL    Lactated Ringers: 1200 mL    Norepinephrine: 4.8 mL    Norepinephrine: 4.8 mL    Norepinephrine: 1.2 mL    Norepinephrine: 6.5 mL    Oral Fluid: 1180 mL  Total IN: 2597.3 mL    OUT:    Indwelling Catheter - Urethral (mL): 1215 mL  Total OUT: 1215 mL    Total NET: 1382.3 mL      10 Feb 2023 07:01  -  10 Feb 2023 11:26  --------------------------------------------------------  IN:    Oral Fluid: 720 mL  Total IN: 720 mL    OUT:    Indwelling Catheter - Urethral (mL): 330 mL  Total OUT: 330 mL    Total NET: 390 mL          PHYSICAL EXAM:   a&ox2  follows commands, ELIZONDO  no acute distress  equal chest rise b/l  abdomen soft  extremities soft  left hip with mild ecchymosis   urinary cath in place

## 2023-02-10 NOTE — CONSULT NOTE ADULT - CONSULT REQUESTED DATE/TIME
06-Feb-2023 10:00
06-Feb-2023 16:55
06-Feb-2023 17:57
05-Feb-2023 23:36
07-Feb-2023 22:29
10-Feb-2023
05-Feb-2023 20:03
06-Feb-2023 10:41

## 2023-02-10 NOTE — PROGRESS NOTE ADULT - SUBJECTIVE AND OBJECTIVE BOX
SURGERY PROGRESS NOTE     Patient: BRIANNE DELACRUZ , 89y ([birth year])Female   MRN: [id number]  Location: [address]  Visit: 02-06-23 Inpatient  Date: 02-10-23 @ 12:28       Events of past 24 hours:  Patient seen resting comfortably in bed. No acute events overnight. Off levo.     PAST MEDICAL & SURGICAL HISTORY:  HTN (hypertension)      DM (diabetes mellitus)      CAD (coronary artery disease)      Afib      Bladder cancer  in remission      NHL (non-Hodgkin&#x27;s lymphoma)  in remission      HLD (hyperlipidemia)      Glaucoma      Cataract      Hypothyroidism      Congestive heart failure of unknown etiology      Congestive heart failure of unknown etiology      History of total hysterectomy           Vitals:   T(F): 96.8 (02-10-23 @ 12:00), Max: 96.9 (02-10-23 @ 04:00)  HR: 86 (02-10-23 @ 12:00)  BP: 140/68 (02-10-23 @ 12:00)  RR: 20 (02-10-23 @ 12:00)  SpO2: 96% (02-10-23 @ 12:00)          Fluids:     I & O's:    02-09-23 @ 07:01  -  02-10-23 @ 07:00  --------------------------------------------------------  IN:    IV PiggyBack: 100 mL    IV PiggyBack: 100 mL    Lactated Ringers: 1200 mL    Norepinephrine: 4.8 mL    Norepinephrine: 4.8 mL    Norepinephrine: 1.2 mL    Norepinephrine: 6.5 mL    Oral Fluid: 1180 mL  Total IN: 2597.3 mL    OUT:    Indwelling Catheter - Urethral (mL): 1215 mL  Total OUT: 1215 mL    Total NET: 1382.3 mL           PHYSICAL EXAM:   General: NAD, calm and cooperative. AAO x3.  Cardiac: RRR.  Respiratory: On RA. Speaks in complete sentences. No accessory muscles of respiration in use. Bilateral chest rise.  Abdomen: Soft, non-distended, non-tender, no rebound, no guarding.   Neuro: Decreased movement of LLE 2/2 pain. Otherwise moves all extremities.  Vascular: Pulses 2+ throughout, extremities well perfused.  Skin: Warm/dry, normal color, no jaundice.      MEDICATIONS  (STANDING):  acetaminophen     Tablet .. 650 milliGRAM(s) Oral every 6 hours  apixaban 2.5 milliGRAM(s) Oral every 12 hours  carvedilol 3.125 milliGRAM(s) Oral every 12 hours  chlorhexidine 2% Cloths 1 Application(s) Topical <User Schedule>  donepezil 10 milliGRAM(s) Oral at bedtime  gabapentin 100 milliGRAM(s) Oral every 8 hours  insulin lispro (ADMELOG) corrective regimen sliding scale   SubCutaneous three times a day before meals  levothyroxine 25 MICROGram(s) Oral daily  memantine 5 milliGRAM(s) Oral daily  midodrine 10 milliGRAM(s) Oral every 8 hours  senna 2 Tablet(s) Oral at bedtime    MEDICATIONS  (PRN):  traMADol 25 milliGRAM(s) Oral every 8 hours PRN Moderate Pain (4 - 6)      DVT PROPHYLAXIS:   GI PROPHYLAXIS:   ANTICOAGULATION:   ANTIBIOTICS:            LAB/STUDIES:  Labs:  CAPILLARY BLOOD GLUCOSE      POCT Blood Glucose.: 147 mg/dL (10 Feb 2023 11:13)  POCT Blood Glucose.: 127 mg/dL (10 Feb 2023 06:27)  POCT Blood Glucose.: 140 mg/dL (09 Feb 2023 23:01)  POCT Blood Glucose.: 114 mg/dL (09 Feb 2023 16:46)  POCT Blood Glucose.: 145 mg/dL (09 Feb 2023 12:59)                          10.7   9.63  )-----------( 186      ( 09 Feb 2023 21:45 )             33.1         02-09    132<L>  |  102  |  30<H>  ----------------------------<  201<H>  4.7   |  20  |  1.2      Calcium, Total Serum: 9.0 mg/dL (02-09-23 @ 21:45)      LFTs:         Coags:    CARDIAC MARKERS ( 09 Feb 2023 23:00 )  x     / <0.01 ng/mL / 20 U/L / x     / 2.6 ng/mL  CARDIAC MARKERS ( 08 Feb 2023 17:01 )  x     / <0.01 ng/mL / x     / x     / x

## 2023-02-10 NOTE — PROGRESS NOTE ADULT - ASSESSMENT
89 yo female s/p unwitnessed fall, ?ht, -loc, +Eliquis    INJURIES:  -LEFT superior pubic rami fx w/ 1.5cm displacement  -Inferior plate compression fracture of L3.     NEURO:  #Acute pain    -APAP    #endplate L3 fracture   -NSx consulted: no nsx intervention, abdominal binder and TLSO brace, ok for OOB with PT     -f/u TLSO brace    #hx vascular dementia     -donezepil 10 milliGRAM(s) Oral     -memantine 5 milliGRAM(s) Oral     RESP:   #hx COPD (home 2L oxygen)    -96-99% on 2L NC, maintain saturation >88%    -500 on IS   #Activity    -increase as tolerated      CARDS:   #hypotension most likely secondary to hypovolemia     -off levophed 5pm 2/9   - midodrine 10 q8  #hx Afib  -carvedilol 3.125 mg q12  -Eliquis 2.5mg BID restarted 2/9  #hx HTN  -previous irbesartan and Imdur discontinued  #Hx CHF    -last echo in Jan EF 25-30%    -Echo 2/8: EF 30-35%, severely enlarged RV with moderately reduced systolic function, septal flattening consistent with right ventricular pressure overload, mild to mod TR  #hx CAD s/p PCI    GI/NUTR:   #Diet     -advanced to soft, easy to chew, carb consistent    -aspiration precautions, HOB 30  #GI Prophylaxis    -not indicated  #Bowel regimen    -not indicated    /RENAL:   #bladder contusion     -CT Cysto 2/7- black to remain, per radiology on phone, no rupture, but +contusion    -Urology recs: Repeat CT cystogram on day of discharge (bladder underdistended on initial imaging) to determine whether black can be removed    -indwelling black (placed in ED 2/6)  # urine output sufficient 40-50cc/hr  #MIVF    -LR @50ml/hr  #hx CKD (baseline Cr 1.6)  -baseline Cr 1.6   #hx bladder cancer s/p laser 2016  Labs:          BUN/Cr- 38/1.5  -->,  30/1.2  -->          Electrolytes-Na 132 // K 4.7 // Mg 1.9 //  Phos 3.3 (02-09 @ 21:45)         HEME/ONC:   #DVT prophylaxis-HSQ q8h, SCDs  #hx of Afib  - eliquis restarted 2.5 BID    #non hodgkin's lymphoma in remission, last chemotherapy April 2018    Labs: Hb/Hct:  11.3/34.7  -->,  10.9/33.4  -->,  10.7/33.1  -->  	Plts:  184  -->,  190  -->,  186  -->                PTT/INR:                  T&S: (02-06)    Home Rx: Eliquis 2.5 mg oral tablet: 1 tab(s) orally 2 times a day    ID:  #UTI present on admission    -cefepime 1g q8 to end today 2/9  after 10pm dose   WBC- 8.55  --->>,  9.25  --->>,  9.63  --->>  Temp trend- 24hrs T(F): 96.6 (02-09 @ 16:00), Max: 96.7 (02-09 @ 08:00)  Antibiotics-cefepime   IVPB    cefepime   IVPB 1000 every 8 hours    Cultures: (collected 02-06 @ 11:11)  Source: Catheterized Catheterized  Final Report:    >100,000 CFU/ml Escherichia coli  Organism: Escherichia coli    Culture:UCx e. coli, sens pending    ENDO:    #hx DM  -A1c 7.1%  -on ISS    #hx hypothyroidism   -synthroid 25mcg PO daily     MSK:  #left superior pubic rami fracture with 1.5cm displacement   -per ortho no surgical intervention, LLE WBAT     #acute stage 2 DTI    -wound consult placed, f/u?    #PT/OT consult  #physiatry: consider d/c to subacute rehab when medically stable     LINES/DRAINS:  PIV, Black (2/6), Lt radial a-line (2/6)    ADVANCED DIRECTIVES:  DNR/DNI, MOLST completed    HCP/Emergency Contact-Sarah Chaney (daughter) [phone]    -per ortho no surgical intervention, LLE Weight Bearing as Tolerated ubis, HYPOtension    DISPO:   SICU   89 yo female s/p unwitnessed fall, ?ht, -loc, +Eliquis    INJURIES:  -LEFT superior pubic rami fx w/ 1.5cm displacement  -Inferior plate compression fracture of L3.     NEURO:  #Acute pain    -APAP, tramadol prn, gabapentin 100 q8    #endplate L3 fracture   -NSx consulted: no nsx intervention, abdominal binder and TLSO brace, ok for OOB with PT     -f/u TLSO brace    #hx vascular dementia     -donezepil 10 milliGRAM(s) Oral     -memantine 5 milliGRAM(s) Oral     RESP:   #hx COPD (home 2L oxygen)    -96-99% on 2L NC, maintain saturation >88%    -500 on IS   #Activity    -increase as tolerated      CARDS:   #hypotension most likely secondary to hypovolemia     -off levophed since midnight 2/10 > remained off    - midodrine 10 q8  #nonsustained vtach x1   - cardiology consult   #hx Afib  -carvedilol 3.125 mg q12  -Eliquis 2.5mg BID restarted 2/9  #hx HTN  -previous irbesartan and Imdur discontinued  #Hx CHF    -last echo in Jan EF 25-30%    -Echo 2/8: EF 30-35%, severely enlarged RV with moderately reduced systolic function, septal flattening consistent with right ventricular pressure overload, mild to mod TR  #hx CAD s/p PCI    GI/NUTR:   #Diet     -advanced to soft, easy to chew, carb consistent  - IVL     -aspiration precautions, HOB 30  #GI Prophylaxis    -not indicated  #Bowel regimen    - senna     /RENAL:   #bladder contusion     -CT Cysto 2/7- black to remain, per radiology on phone, no rupture, but +contusion    -Urology recs: Repeat CT cystogram on day of discharge (bladder underdistended on initial imaging) to determine whether black can be removed    -indwelling black (placed in ED 2/6)  # urine output sufficient 40-50cc/hr  #hx CKD (baseline Cr 1.6)  #hx bladder cancer s/p laser 2016  Labs:          BUN/Cr- 38/1.5  -->,  30/1.2  -->          Electrolytes-Na 132 // K 4.7 // Mg 1.9 //  Phos 3.3 (02-09 @ 21:45)         HEME/ONC:   #DVT prophylaxis- holding DVT ppx as patient is on Eliquis   #hx of Afib  - eliquis restarted 2.5 BID    #non hodgkin's lymphoma in remission, last chemotherapy April 2018    DVT prophylaxis-, SCDs    Labs: Hb/Hct:  10.9/33.4  -->,  10.7/33.1  -->                      Plts:  190  -->,  186  -->                 PTT/INR:        Home Rx: Eliquis 2.5 mg oral tablet: 1 tab(s) orally 2 times a day      ID:  #UTI present on admission    -cefepime 1g q8 ended 2/9    WBC- 8.55  --->>,  9.25  --->>,  9.63  --->>  Temp trend- 24hrs T(F): 96.7 (02-10 @ 07:26), Max: 96.9 (02-10 @ 04:00)  Antibiotics-  Cultures:     Cultures: (collected 02-06 @ 11:11)  Source: Catheterized Catheterized  Final Report:    >100,000 CFU/ml Escherichia coli  Organism: Escherichia coli    Culture:UCx e. coli, sens pending    ENDO:    #hx DM  -A1c 7.1%  -on ISS    #hx hypothyroidism   -synthroid 25mcg PO daily     MSK:  #left superior pubic rami fracture with 1.5cm displacement   -per ortho no surgical intervention, LLE WBAT     #acute stage 2 DTI    -wound consult placed, f/u?    #PT/OT consult  #physiatry: consider d/c to subacute rehab when medically stable     LINES/DRAINS:  Tong JOHN (2/6)    ADVANCED DIRECTIVES:  DNR/DNI, MOLST completed    HCP/Emergency Contact-Sarah Chaney (daughter) [phone]    -per ortho no surgical intervention, LLE Weight Bearing as Tolerated ubis, HYPOtension    DISPO:   Downgrade to surgical floor

## 2023-02-10 NOTE — CONSULT NOTE ADULT - SUBJECTIVE AND OBJECTIVE BOX
Patient is a 89y old  Female who presents with a chief complaint of pelvic fx (08 Feb 2023 12:00)      HPI:  I was called to see her for NSVT. On Feb 6th there was a consult for me but was called to [id number], different group and office, so our office did not receive the request.  Post fall, non witnessed, low BP, as per family in the last few weeks has been having low BP, no evidence of sepsis, bleeding CVA, unknown etiology for the low BP other than her cardiac medicine (taking for the cardiomyopathy), unwitnessed fall, L3 and pubic Fx,  chronic Afib, dilated CMP, euvolemic, taking Eliquis.  In hospital has been on Levophed for the hypotension and had a brief NSVT.     89yF w/ PMHx of HTN, DM, Dementia seen as  Trauma Alert s/p unwitnessed fall, ?ht, -loc, +eliquis. The patient was in her rocking chair when a family member heard a thud-the patient is unable to describe the events but a family member was at bedside stating she did not have positive head strike. The patient has no external signs of trauma and denies any pain. The patient was found to have pelvic fractures on trauma work up and admitted to the trauma service for rehab purposes. Orthopedics recommends non-op management.  (06 Feb 2023 04:06)      PAST MEDICAL & SURGICAL HISTORY:  HTN (hypertension)      DM (diabetes mellitus)      CAD (coronary artery disease)      Afib      Bladder cancer  in remission      NHL (non-Hodgkin&#x27;s lymphoma)  in remission      HLD (hyperlipidemia)      Glaucoma      Cataract      Hypothyroidism      Congestive heart failure of unknown etiology      Congestive heart failure of unknown etiology      History of total hysterectomy          PREVIOUS DIAGNOSTIC TESTING:      ECHO  FINDINGS: < from: TTE Echo Complete w/o Contrast w/ Doppler (02.08.23 @ 08:43) >   1. Technically difficult study.   2. Severely decreased global left ventricular systolic function with   increased wall thickness. Left ventricular ejection fraction, by visual   estimation, is 30 to 35%.   3. The left ventricular diastolic function could not be assessed in this   study.   4. Severely enlarged right ventricle (RVEDD = 4.5cm; RV>LV) with   moderately reduced systolic function.   5. Interventricular septal flattening during systole consistent with   right ventricular pressure overload.   6. Left atrial size not well visualized.   7. Moderate to severe right atrial enlargement.   8. Mild-moderate tricuspid regurgitation.   9. No pericardial effusion.  10. No prior TTE images are available for comparison.    < end of copied text >      MEDICATIONS  (STANDING):  acetaminophen     Tablet .. 650 milliGRAM(s) Oral every 6 hours  apixaban 2.5 milliGRAM(s) Oral every 12 hours  carvedilol 3.125 milliGRAM(s) Oral every 12 hours  chlorhexidine 2% Cloths 1 Application(s) Topical <User Schedule>  donepezil 10 milliGRAM(s) Oral at bedtime  gabapentin 100 milliGRAM(s) Oral every 8 hours  insulin lispro (ADMELOG) corrective regimen sliding scale   SubCutaneous three times a day before meals  levothyroxine 25 MICROGram(s) Oral daily  memantine 5 milliGRAM(s) Oral daily  midodrine 10 milliGRAM(s) Oral every 8 hours  senna 2 Tablet(s) Oral at bedtime    MEDICATIONS  (PRN):  traMADol 25 milliGRAM(s) Oral every 8 hours PRN Moderate Pain (4 - 6)      FAMILY HISTORY:  Family history of ischemic heart disease (IHD) (Mother)    Family history of stroke (Mother)    No pertinent past medical history        SOCIAL HISTORY:  CIGARETTES: no  ALCOHOL: no  DRUGS: no                      REVIEW OF SYSTEMS:  unable o answer the question          Vital Signs Last 24 Hrs  T(C): 36 (10 Feb 2023 12:00), Max: 36.1 (10 Feb 2023 04:00)  T(F): 96.8 (10 Feb 2023 12:00), Max: 96.9 (10 Feb 2023 04:00)  HR: 108 (10 Feb 2023 15:00) (66 - 110)  BP: 119/59 (10 Feb 2023 15:00) (98/56 - 145/83)  BP(mean): 85 (10 Feb 2023 15:00) (72 - 105)  RR: 16 (10 Feb 2023 15:00) (15 - 34)  SpO2: 91% (10 Feb 2023 15:00) (91% - 100%)    Parameters below as of 10 Feb 2023 16:00  Patient On (Oxygen Delivery Method): nasal cannula  O2 Flow (L/min): 1                        PHYSICAL EXAM:  GENERAL: No distress, well developed  HEAD:  Atraumatic, Normocephalic  NECK: Supple, No JVD, No Bruit   NERVOUS SYSTEM:  Awake, not Oriented to time, place, person; memory is declined severely  CHEST/LUNG: Normal air entry to lung base bilaterally; No wheeze, crackle,   HEART: Irregular heart beat, S1, A2, P2, No S3, No gallop, No murmur  ABDOMEN: Soft, Non tender, Non distended; Bowel sounds present  EXTREMITIES:  2+ Peripheral Pulses, No edema  SKIN: No rashes or lesions    TELEMETRY: afib,     ECG: < from: 12 Lead ECG (02.10.23 @ 00:51) >  Atrial fibrillation  Right axis deviation  Non-specific intra-ventricular conduction block  Cannot rule out Septal infarct , age undetermined  Abnormal ECG    < end of copied text >      I&O's Detail    09 Feb 2023 07:01  -  10 Feb 2023 07:00  --------------------------------------------------------  IN:    IV PiggyBack: 100 mL    IV PiggyBack: 100 mL    Lactated Ringers: 1200 mL    Norepinephrine: 4.8 mL    Norepinephrine: 4.8 mL    Norepinephrine: 1.2 mL    Norepinephrine: 6.5 mL    Oral Fluid: 1180 mL  Total IN: 2597.3 mL    OUT:    Indwelling Catheter - Urethral (mL): 1215 mL  Total OUT: 1215 mL    Total NET: 1382.3 mL      10 Feb 2023 07:01  -  10 Feb 2023 17:31  --------------------------------------------------------  IN:    Oral Fluid: 1200 mL  Total IN: 1200 mL    OUT:    Indwelling Catheter - Urethral (mL): 515 mL  Total OUT: 515 mL    Total NET: 685 mL          LABS:                        10.7   9.63  )-----------( 186      ( 09 Feb 2023 21:45 )             33.1     02-09    132<L>  |  102  |  30<H>  ----------------------------<  201<H>  4.7   |  20  |  1.2    Ca    9.0      09 Feb 2023 21:45  Phos  3.3     02-09  Mg     1.9     02-09      CARDIAC MARKERS ( 09 Feb 2023 23:00 )  x     / <0.01 ng/mL / 20 U/L / x     / 2.6 ng/mL          I&O's Summary    09 Feb 2023 07:01  -  10 Feb 2023 07:00  --------------------------------------------------------  IN: 2597.3 mL / OUT: 1215 mL / NET: 1382.3 mL    10 Feb 2023 07:01  -  10 Feb 2023 17:31  --------------------------------------------------------  IN: 1200 mL / OUT: 515 mL / NET: 685 mL        RADIOLOGY & ADDITIONAL STUDIES: < from: Xray Chest 1 View- PORTABLE-Routine (02.10.23 @ 06:47) >  Chronic bilateral interstitial changes. Developing right basilar   opacity/atelectasis.    < end of copied text >  < from: Xray Chest 1 View- PORTABLE-Routine (02.07.23 @ 06:54) >  Pulmonary vascular congestion.    < end of copied text >  < from: Xray Femur 2 Views, Left (02.05.23 @ 23:28) >  Again seen is a displaced left superior pubic ramus fracture and   questionable nondisplaced inferior pubic ramus fracture. No femoral   fracture seen. Hip osteoarthritis. Vascular calcifications.    < end of copied text >  < from: CT Abdomen and Pelvis Cystogram w/ Catheter (02.07.23 @ 20:56) >  Incompletely distended urinary bladder with Fortune catheter. Pooling of   contrast seen within the adjacent vagina. There is no evidence of active   extravasation. If there remains concern for urinary bladder rupture,   repeat CT cystogram a a more distended urinary bladder.    Persistent contrast within the kidneys likely reflecting renal failure.   Delayed nephrogram on the right. Perinephric fat stranding, most   pronounced on the right. Contrast visualized in the right renalsinuses   which may represent pyelosinus extravasation from ruptured fornices.   Ill-defined edema surrounding the proximal right ureter. May reflect an   infectious process. Correlate with urinalysis.    Communication: The summary of above findings were discussed with readback   confirmation with Dr. Davis at approximately 9:30 PM on February 7, 2023    < end of copied text >

## 2023-02-10 NOTE — CONSULT NOTE ADULT - ASSESSMENT
Euvolemic cardiomyopathy  Afib, HR at 100+/- partly due to hypotension  Unknown etiology Hypotension  Post fall, trauma, L3 & pubic Fx, but no bleeding  NSVT episodes, brief, no effect on the hemodynamic is due to underlying medical condition and days of pressor support    Suggest switching Carvedilol to Metoprolol 25 mg bid (better rate control less BP drop)  Keep on Midodrine and Eliquis for now  stable to be d/c to rehab if remains at current state  In the f/u If HR becomes fast then reluctantly will add Digoxin 0.125 mg daily, but not now  well oral hydration

## 2023-02-10 NOTE — CHART NOTE - NSCHARTNOTEFT_GEN_A_CORE
SICU Transfer Note    BRIANNE DELACRUZ  89y (17-Dec-1932)  [id number]      Transfer from: SICU  Transfer to: Surgery- Trauma    SICU COURSE:  89y Female HD# 4d  s/p Insertion, arterial line, radial, left        PAST MEDICAL & SURGICAL HISTORY:  HTN (hypertension)  DM (diabetes mellitus)  CAD (coronary artery disease)  Afib  Bladder cancer in remission  NHL (non-Hodgkin&#x27;s lymphoma) in remission  HLD (hyperlipidemia)  Glaucoma  Cataract  Hypothyroidism  Congestive heart failure of unknown etiology  History of total hysterectomy      Allergies  iodine (Rash)  lisinopril (Rash)  Intolerances      MEDICATIONS  (STANDING):  acetaminophen     Tablet .. 650 milliGRAM(s) Oral every 6 hours  apixaban 2.5 milliGRAM(s) Oral every 12 hours  carvedilol 3.125 milliGRAM(s) Oral every 12 hours  chlorhexidine 2% Cloths 1 Application(s) Topical <User Schedule>  donepezil 10 milliGRAM(s) Oral at bedtime  gabapentin 100 milliGRAM(s) Oral every 8 hours  insulin lispro (ADMELOG) corrective regimen sliding scale   SubCutaneous three times a day before meals  levothyroxine 25 MICROGram(s) Oral daily  memantine 5 milliGRAM(s) Oral daily  midodrine 10 milliGRAM(s) Oral every 8 hours  senna 2 Tablet(s) Oral at bedtime    MEDICATIONS  (PRN):  traMADol 25 milliGRAM(s) Oral every 8 hours PRN Moderate Pain (4 - 6)      Vital Signs Last 24 Hrs  T(C): 35.9 (10 Feb 2023 07:26), Max: 36.1 (10 Feb 2023 04:00)  T(F): 96.7 (10 Feb 2023 07:26), Max: 96.9 (10 Feb 2023 04:00)  HR: 93 (10 Feb 2023 11:00) (66 - 110)  BP: 108/63 (10 Feb 2023 11:00) (94/53 - 145/83)  BP(mean): 80 (10 Feb 2023 11:00) (68 - 105)  RR: 25 (10 Feb 2023 11:00) (15 - 45)  SpO2: 97% (10 Feb 2023 11:00) (92% - 100%)    Parameters below as of 10 Feb 2023 11:00  Patient On (Oxygen Delivery Method): nasal cannula  O2 Flow (L/min): 1    I&O's Summary    09 Feb 2023 07:01  -  10 Feb 2023 07:00  --------------------------------------------------------  IN: 2597.3 mL / OUT: 1215 mL / NET: 1382.3 mL    10 Feb 2023 07:01  -  10 Feb 2023 11:39  --------------------------------------------------------  IN: 720 mL / OUT: 330 mL / NET: 390 mL      LABS:                        10.7   9.63  )-----------( 186      ( 09 Feb 2023 21:45 )             33.1       02-09    132<L>  |  102  |  30<H>  ----------------------------<  201<H>  4.7   |  20  |  1.2    Ca    9.0      09 Feb 2023 21:45  Phos  3.3     02-09  Mg     1.9     02-09          CARDIAC MARKERS ( 09 Feb 2023 23:00 )  x     / <0.01 ng/mL / 20 U/L / x     / 2.6 ng/mL  CARDIAC MARKERS ( 08 Feb 2023 17:01 )  x     / <0.01 ng/mL / x     / x     / x          bnp      Assessment & Plan:  89 yo female s/p unwitnessed fall, ?ht, -loc, +Eliquis    INJURIES:  -LEFT superior pubic rami fx w/ 1.5cm displacement  -Inferior plate compression fracture of L3.     NEURO:  #Acute pain    -APAP, tramadol prn, gabapentin 100 q8    #endplate L3 fracture   -NSx consulted: no nsx intervention, abdominal binder and TLSO brace, ok for OOB with PT     -f/u TLSO brace    #hx vascular dementia     -donezepil 10 milliGRAM(s) Oral     -memantine 5 milliGRAM(s) Oral     RESP:   #hx COPD (home 2L oxygen)    -96-99% on 2L NC, maintain saturation >88%    -500 on IS   #Activity    -increase as tolerated      CARDS:   #hypotension most likely secondary to hypovolemia     -off levophed since midnight 2/10 > remained off    - midodrine 10 q8  #nonsustained vtach x1   - cardiology consult   #hx Afib  -carvedilol 3.125 mg q12  -Eliquis 2.5mg BID restarted 2/9  #hx HTN  -previous irbesartan and Imdur discontinued  #Hx CHF    -last echo in Jan EF 25-30%    -Echo 2/8: EF 30-35%, severely enlarged RV with moderately reduced systolic function, septal flattening consistent with right ventricular pressure overload, mild to mod TR  #hx CAD s/p PCI    GI/NUTR:   #Diet     -advanced to soft, easy to chew, carb consistent  - IVL     -aspiration precautions, HOB 30  #GI Prophylaxis    -not indicated  #Bowel regimen    - senna     /RENAL:   #bladder contusion     -CT Cysto 2/7- black to remain, per radiology on phone, no rupture, but +contusion    -Urology recs: Repeat CT cystogram on day of discharge (bladder underdistended on initial imaging) to determine whether black can be removed    -indwelling black (placed in ED 2/6)  # urine output sufficient 40-50cc/hr  #hx CKD (baseline Cr 1.6)  #hx bladder cancer s/p laser 2016  Labs:          BUN/Cr- 38/1.5  -->,  30/1.2  -->          Electrolytes-Na 132 // K 4.7 // Mg 1.9 //  Phos 3.3 (02-09 @ 21:45)         HEME/ONC:   #DVT prophylaxis- holding DVT ppx as patient is on Eliquis   #hx of Afib  - eliquis restarted 2.5 BID    #non hodgkin's lymphoma in remission, last chemotherapy April 2018    DVT prophylaxis-, SCDs    Labs: Hb/Hct:  10.9/33.4  -->,  10.7/33.1  -->                      Plts:  190  -->,  186  -->                 PTT/INR:        Home Rx: Eliquis 2.5 mg oral tablet: 1 tab(s) orally 2 times a day      ID:  #UTI present on admission    -cefepime 1g q8 ended 2/9    WBC- 8.55  --->>,  9.25  --->>,  9.63  --->>  Temp trend- 24hrs T(F): 96.7 (02-10 @ 07:26), Max: 96.9 (02-10 @ 04:00)  Antibiotics-  Cultures:     Cultures: (collected 02-06 @ 11:11)  Source: Catheterized Catheterized  Final Report:    >100,000 CFU/ml Escherichia coli  Organism: Escherichia coli    Culture:UCx e. coli, sens pending    ENDO:    #hx DM  -A1c 7.1%  -on ISS    #hx hypothyroidism   -synthroid 25mcg PO daily     MSK:  #left superior pubic rami fracture with 1.5cm displacement   -per ortho no surgical intervention, LLE WBAT     #acute stage 2 DTI    -wound consult placed, f/u?    #PT/OT consult  #physiatry: consider d/c to subacute rehab when medically stable     LINES/DRAINS:  PIV, Black (2/6)    ADVANCED DIRECTIVES:  DNR/DNI, MOLST completed    HCP/Emergency Contact-Sarah Chaney (daughter) [phone]    -per ortho no surgical intervention, LLE Weight Bearing as Tolerated ubis, HYPOtension    DISPO:   Downgrade to surgical floor     Follow Up:  - 2000 labs  - cardiology consult for non sustained vtach   - TLSO brace   - repeat CT Cystogram on day of discharge         Signed out to:  Date:  Time: SICU Transfer Note    BRIANNE DELACRUZ  89y (17-Dec-1932)  [id number]      Transfer from: SICU  Transfer to: Surgery- Trauma    SICU COURSE:  89y Female HD# 4d  s/p Insertion, arterial line, radial, left    HPI   89 yo F PMH HTN, DM, Afib on eliquis, dementia, CAD s/p PCI x3 2003, HTN, DLD, COPD on 2L NC, glaucoma/cataract, bladder cancer s/p laser 2016,  non hodgkin's lymphoma in remission last chemotherapy April 2018, vascular dementia, stage 3 CKD (baseline Cr 1.6), seen as  Trauma Alert 02/05 night s/p unwitnessed fall, ?ht, -loc, +eliquis. The patient was in her rocking chair when a family member heard a thud-the patient is unable to describe the events but a family member was at bedside stating she did not have positive head strike. The patient has no external signs of trauma and denies any pain. The patient was found to have left superior pubic rami fracture with 1.5cm displacement and endplate L3 fracture on trauma work up and admitted to the trauma service for rehab purposes. Orthopedics recommends non-op management.     SICU consulted for SDU evaluation due to patient's hypotension. Patient became hypotensive with SBP in 70s around 8am this morning; trauma team was notified and patient received 1L LR bolus.   Patient evaluated in ED by SICU team with attending bedside. Patient's BP noted to improve after administration of bolus, BP 96/52 at time of examination. Patient was A&O to person and place, and did not endorse any pain, SOB, CP, numbness, or tingling at time of examination. No external deformities of the legs noted on examination. Patient was able to move all extremities. Thigh compartments soft and compressible. After discussion with attending, patient approved for transfer to SICU for hemodynamic monitoring.     SICU course was complicated by hypotension requiring vasopressors. Patient has been off of vasopressors since 12AM. Patient also had afib with rvr this admission. Patient was restarted on her home coreg and rate has been better controlled. Patient seen on AM rounds with SICU attending and is stable for downgrade to telemetry.       PAST MEDICAL & SURGICAL HISTORY:  HTN (hypertension)  DM (diabetes mellitus)  CAD (coronary artery disease)  Afib  Bladder cancer in remission  NHL (non-Hodgkin&#x27;s lymphoma) in remission  HLD (hyperlipidemia)  Glaucoma  Cataract  Hypothyroidism  Congestive heart failure of unknown etiology  History of total hysterectomy      Allergies  iodine (Rash)  lisinopril (Rash)  Intolerances      MEDICATIONS  (STANDING):  acetaminophen     Tablet .. 650 milliGRAM(s) Oral every 6 hours  apixaban 2.5 milliGRAM(s) Oral every 12 hours  carvedilol 3.125 milliGRAM(s) Oral every 12 hours  chlorhexidine 2% Cloths 1 Application(s) Topical <User Schedule>  donepezil 10 milliGRAM(s) Oral at bedtime  gabapentin 100 milliGRAM(s) Oral every 8 hours  insulin lispro (ADMELOG) corrective regimen sliding scale   SubCutaneous three times a day before meals  levothyroxine 25 MICROGram(s) Oral daily  memantine 5 milliGRAM(s) Oral daily  midodrine 10 milliGRAM(s) Oral every 8 hours  senna 2 Tablet(s) Oral at bedtime    MEDICATIONS  (PRN):  traMADol 25 milliGRAM(s) Oral every 8 hours PRN Moderate Pain (4 - 6)      Vital Signs Last 24 Hrs  T(C): 35.9 (10 Feb 2023 07:26), Max: 36.1 (10 Feb 2023 04:00)  T(F): 96.7 (10 Feb 2023 07:26), Max: 96.9 (10 Feb 2023 04:00)  HR: 93 (10 Feb 2023 11:00) (66 - 110)  BP: 108/63 (10 Feb 2023 11:00) (94/53 - 145/83)  BP(mean): 80 (10 Feb 2023 11:00) (68 - 105)  RR: 25 (10 Feb 2023 11:00) (15 - 45)  SpO2: 97% (10 Feb 2023 11:00) (92% - 100%)    Parameters below as of 10 Feb 2023 11:00  Patient On (Oxygen Delivery Method): nasal cannula  O2 Flow (L/min): 1    I&O's Summary    09 Feb 2023 07:01  -  10 Feb 2023 07:00  --------------------------------------------------------  IN: 2597.3 mL / OUT: 1215 mL / NET: 1382.3 mL    10 Feb 2023 07:01  -  10 Feb 2023 11:39  --------------------------------------------------------  IN: 720 mL / OUT: 330 mL / NET: 390 mL      LABS:                        10.7   9.63  )-----------( 186      ( 09 Feb 2023 21:45 )             33.1       02-09    132<L>  |  102  |  30<H>  ----------------------------<  201<H>  4.7   |  20  |  1.2    Ca    9.0      09 Feb 2023 21:45  Phos  3.3     02-09  Mg     1.9     02-09          CARDIAC MARKERS ( 09 Feb 2023 23:00 )  x     / <0.01 ng/mL / 20 U/L / x     / 2.6 ng/mL  CARDIAC MARKERS ( 08 Feb 2023 17:01 )  x     / <0.01 ng/mL / x     / x     / x          bnp      Assessment & Plan:  89 yo female s/p unwitnessed fall, ?ht, -loc, +Eliquis    INJURIES:  -LEFT superior pubic rami fx w/ 1.5cm displacement  -Inferior plate compression fracture of L3.     NEURO:  #Acute pain    -APAP, tramadol prn, gabapentin 100 q8    #endplate L3 fracture   -NSx consulted: no nsx intervention, abdominal binder and TLSO brace, ok for OOB with PT   -f/u TLSO brace    #hx vascular dementia     -donezepil 10 milliGRAM(s) Oral     -memantine 5 milliGRAM(s) Oral     RESP:   #hx COPD (home 2L oxygen)    -96-99% on 2L NC, maintain saturation >88%    -500 on IS   #Activity    -increase as tolerated      CARDS:   #hypotension most likely secondary to hypovolemia     -off levophed since midnight 2/10 > remained off    - midodrine 10 q8  #nonsustained vtach x1   - cardiology consult   #hx Afib  -carvedilol 3.125 mg q12  -Eliquis 2.5mg BID restarted 2/9  #hx HTN  -previous irbesartan and Imdur discontinued  #Hx CHF    -last echo in Jan EF 25-30%    -Echo 2/8: EF 30-35%, severely enlarged RV with moderately reduced systolic function, septal flattening consistent with right ventricular pressure overload, mild to mod TR  #hx CAD s/p PCI    GI/NUTR:   #Diet     -advanced to soft, easy to chew, carb consistent  - IVL     -aspiration precautions, HOB 30  #GI Prophylaxis    -not indicated  #Bowel regimen    - senna     /RENAL:   #bladder contusion     -CT Cysto 2/7- black to remain, per radiology on phone, no rupture, but +contusion    -Urology recs: Repeat CT cystogram on day of discharge (bladder underdistended on initial imaging) to determine whether black can be removed    -indwelling black (placed in ED 2/6)  # urine output sufficient 40-50cc/hr  #hx CKD (baseline Cr 1.6)  #hx bladder cancer s/p laser 2016  Labs:          BUN/Cr- 38/1.5  -->,  30/1.2  -->          Electrolytes-Na 132 // K 4.7 // Mg 1.9 //  Phos 3.3 (02-09 @ 21:45)         HEME/ONC:   #DVT prophylaxis- holding DVT ppx as patient is on Eliquis   #hx of Afib  - eliquis restarted 2.5 BID    #non hodgkin's lymphoma in remission, last chemotherapy April 2018    DVT prophylaxis-, SCDs    Labs: Hb/Hct:  10.9/33.4  -->,  10.7/33.1  -->                      Plts:  190  -->,  186  -->                 PTT/INR:        Home Rx: Eliquis 2.5 mg oral tablet: 1 tab(s) orally 2 times a day      ID:  #UTI present on admission    -cefepime 1g q8 ended 2/9    WBC- 8.55  --->>,  9.25  --->>,  9.63  --->>  Temp trend- 24hrs T(F): 96.7 (02-10 @ 07:26), Max: 96.9 (02-10 @ 04:00)  Antibiotics-  Cultures:     Cultures: (collected 02-06 @ 11:11)  Source: Catheterized Catheterized  Final Report:    >100,000 CFU/ml Escherichia coli  Organism: Escherichia coli    Culture:UCx e. coli, sens pending    ENDO:    #hx DM  -A1c 7.1%  -on ISS    #hx hypothyroidism   -synthroid 25mcg PO daily     MSK:  #left superior pubic rami fracture with 1.5cm displacement   -per ortho no surgical intervention, LLE WBAT     #acute stage 2 DTI    -wound consult placed, f/u?    #PT/OT consult  #physiatry: consider d/c to subacute rehab when medically stable     LINES/DRAINS:  Tong JOHN (2/6)    ADVANCED DIRECTIVES:  DNR/DNI, MOLST completed    HCP/Emergency Contact-Sarah Chaney (daughter) [phone]    -per ortho no surgical intervention, LLE Weight Bearing as Tolerated ubis, HYPOtension    DISPO:   Downgrade to surgical floor     Follow Up:  - 2000 labs  - cardiology consult for non sustained vtach   - TLSO brace   - repeat CT Cystogram on day of discharge     Signed out to: GABRIEL Moore  Date: 2/10/23  Time: 13:55 SICU Transfer Note    BRIANNE DELACRUZ  89y (17-Dec-1932)  [id number]      Transfer from: SICU  Transfer to: Surgery- Trauma    SICU COURSE:  89y Female HD# 4d  s/p Insertion, arterial line, radial, left    HPI   89 yo F PMH HTN, DM, Afib on eliquis, dementia, CAD s/p PCI x3 2003, HTN, DLD, COPD on 2L NC, glaucoma/cataract, bladder cancer s/p laser 2016,  non hodgkin's lymphoma in remission last chemotherapy April 2018, vascular dementia, stage 3 CKD (baseline Cr 1.6), seen as  Trauma Alert 02/05 night s/p unwitnessed fall, ?ht, -loc, +eliquis. The patient was in her rocking chair when a family member heard a thud-the patient is unable to describe the events but a family member was at bedside stating she did not have positive head strike. The patient has no external signs of trauma and denies any pain. The patient was found to have left superior pubic rami fracture with 1.5cm displacement and endplate L3 fracture on trauma work up and admitted to the trauma service for rehab purposes. Orthopedics recommends non-op management.     SICU consulted for SDU evaluation due to patient's hypotension. Patient became hypotensive with SBP in 70s around 8am this morning; trauma team was notified and patient received 1L LR bolus.   Patient evaluated in ED by SICU team with attending bedside. Patient's BP noted to improve after administration of bolus, BP 96/52 at time of examination. Patient was A&O to person and place, and did not endorse any pain, SOB, CP, numbness, or tingling at time of examination. No external deformities of the legs noted on examination. Patient was able to move all extremities. Thigh compartments soft and compressible. After discussion with attending, patient approved for transfer to SICU for hemodynamic monitoring.     SICU course was complicated by hypotension requiring vasopressors. Patient has been off of vasopressors since 12AM. Patient also had afib with rvr this admission. Patient was restarted on her home coreg and rate has been better controlled. Patient seen on AM rounds with SICU attending and is stable for downgrade to telemetry.       PAST MEDICAL & SURGICAL HISTORY:  HTN (hypertension)  DM (diabetes mellitus)  CAD (coronary artery disease)  Afib  Bladder cancer in remission  NHL (non-Hodgkin&#x27;s lymphoma) in remission  HLD (hyperlipidemia)  Glaucoma  Cataract  Hypothyroidism  Congestive heart failure of unknown etiology  History of total hysterectomy      Allergies  iodine (Rash)  lisinopril (Rash)  Intolerances      MEDICATIONS  (STANDING):  acetaminophen     Tablet .. 650 milliGRAM(s) Oral every 6 hours  apixaban 2.5 milliGRAM(s) Oral every 12 hours  carvedilol 3.125 milliGRAM(s) Oral every 12 hours  chlorhexidine 2% Cloths 1 Application(s) Topical <User Schedule>  donepezil 10 milliGRAM(s) Oral at bedtime  gabapentin 100 milliGRAM(s) Oral every 8 hours  insulin lispro (ADMELOG) corrective regimen sliding scale   SubCutaneous three times a day before meals  levothyroxine 25 MICROGram(s) Oral daily  memantine 5 milliGRAM(s) Oral daily  midodrine 10 milliGRAM(s) Oral every 8 hours  senna 2 Tablet(s) Oral at bedtime    MEDICATIONS  (PRN):  traMADol 25 milliGRAM(s) Oral every 8 hours PRN Moderate Pain (4 - 6)      Vital Signs Last 24 Hrs  T(C): 35.9 (10 Feb 2023 07:26), Max: 36.1 (10 Feb 2023 04:00)  T(F): 96.7 (10 Feb 2023 07:26), Max: 96.9 (10 Feb 2023 04:00)  HR: 93 (10 Feb 2023 11:00) (66 - 110)  BP: 108/63 (10 Feb 2023 11:00) (94/53 - 145/83)  BP(mean): 80 (10 Feb 2023 11:00) (68 - 105)  RR: 25 (10 Feb 2023 11:00) (15 - 45)  SpO2: 97% (10 Feb 2023 11:00) (92% - 100%)    Parameters below as of 10 Feb 2023 11:00  Patient On (Oxygen Delivery Method): nasal cannula  O2 Flow (L/min): 1    I&O's Summary    09 Feb 2023 07:01  -  10 Feb 2023 07:00  --------------------------------------------------------  IN: 2597.3 mL / OUT: 1215 mL / NET: 1382.3 mL    10 Feb 2023 07:01  -  10 Feb 2023 11:39  --------------------------------------------------------  IN: 720 mL / OUT: 330 mL / NET: 390 mL      LABS:                        10.7   9.63  )-----------( 186      ( 09 Feb 2023 21:45 )             33.1       02-09    132<L>  |  102  |  30<H>  ----------------------------<  201<H>  4.7   |  20  |  1.2    Ca    9.0      09 Feb 2023 21:45  Phos  3.3     02-09  Mg     1.9     02-09          CARDIAC MARKERS ( 09 Feb 2023 23:00 )  x     / <0.01 ng/mL / 20 U/L / x     / 2.6 ng/mL  CARDIAC MARKERS ( 08 Feb 2023 17:01 )  x     / <0.01 ng/mL / x     / x     / x          bnp      Assessment & Plan:  89 yo female s/p unwitnessed fall, ?ht, -loc, +Eliquis    INJURIES:  -LEFT superior pubic rami fx w/ 1.5cm displacement  -Inferior plate compression fracture of L3.     NEURO:  #Acute pain    -APAP, tramadol prn, gabapentin 100 q8    #endplate L3 fracture   -NSx consulted: no nsx intervention, abdominal binder and TLSO brace, ok for OOB with PT   -f/u TLSO brace    #hx vascular dementia     -donezepil 10 milliGRAM(s) Oral     -memantine 5 milliGRAM(s) Oral     RESP:   #hx COPD (home 2L oxygen)    -96-99% on 2L NC, maintain saturation >88%    -500 on IS   #Activity    -increase as tolerated      CARDS:   #hypotension most likely secondary to hypovolemia     -off levophed since midnight 2/10 > remained off    - midodrine 10 q8  #nonsustained vtach x1   - cardiology consult - Seen by Dr. Salazar 2/10. Recommended switching to metoprolol from coreg. Dr. Salazar also stated that patient does not need telemetry bed.   #hx Afib  -carvedilol 3.125 mg q12 > switched to metoprolol per cards recommendation   -Eliquis 2.5mg BID restarted 2/9  #hx HTN  -previous irbesartan and Imdur discontinued  #Hx CHF    -last echo in Jan EF 25-30%    -Echo 2/8: EF 30-35%, severely enlarged RV with moderately reduced systolic function, septal flattening consistent with right ventricular pressure overload, mild to mod TR  #hx CAD s/p PCI    GI/NUTR:   #Diet     -advanced to soft, easy to chew, carb consistent  - IVL     -aspiration precautions, HOB 30  #GI Prophylaxis    -not indicated  #Bowel regimen    - senna     /RENAL:   #bladder contusion     -CT Cysto 2/7- black to remain, per radiology on phone, no rupture, but +contusion    -Urology recs: Repeat CT cystogram on day of discharge (bladder underdistended on initial imaging) to determine whether black can be removed    -indwelling black (placed in ED 2/6)  # urine output sufficient 40-50cc/hr  #hx CKD (baseline Cr 1.6)  #hx bladder cancer s/p laser 2016  Labs:          BUN/Cr- 38/1.5  -->,  30/1.2  -->          Electrolytes-Na 132 // K 4.7 // Mg 1.9 //  Phos 3.3 (02-09 @ 21:45)         HEME/ONC:   #DVT prophylaxis- holding DVT ppx as patient is on Eliquis   #hx of Afib  - eliquis restarted 2.5 BID    #non hodgkin's lymphoma in remission, last chemotherapy April 2018    DVT prophylaxis-, SCDs    Labs: Hb/Hct:  10.9/33.4  -->,  10.7/33.1  -->                      Plts:  190  -->,  186  -->                 PTT/INR:        Home Rx: Eliquis 2.5 mg oral tablet: 1 tab(s) orally 2 times a day      ID:  #UTI present on admission    -cefepime 1g q8 ended 2/9    WBC- 8.55  --->>,  9.25  --->>,  9.63  --->>  Temp trend- 24hrs T(F): 96.7 (02-10 @ 07:26), Max: 96.9 (02-10 @ 04:00)  Antibiotics-  Cultures:     Cultures: (collected 02-06 @ 11:11)  Source: Catheterized Catheterized  Final Report:    >100,000 CFU/ml Escherichia coli  Organism: Escherichia coli    Culture:UCx e. coli, sens pending    ENDO:    #hx DM  -A1c 7.1%  -on ISS    #hx hypothyroidism   -synthroid 25mcg PO daily     MSK:  #left superior pubic rami fracture with 1.5cm displacement   -per ortho no surgical intervention, LLE WBAT     #acute stage 2 DTI    -wound consult placed, f/u?    #PT/OT consult  #physiatry: consider d/c to subacute rehab when medically stable     LINES/DRAINS:  Tong JOHN (2/6)    ADVANCED DIRECTIVES:  DNR/DNI, MOLST completed    HCP/Emergency Contact-Sarah Shiv (daughter) [phone]    -per ortho no surgical intervention, LLE Weight Bearing as Tolerated ubis, HYPOtension    DISPO:   Downgrade to surgical floor     Follow Up:  - 2000 labs  - cardiology consult for non sustained vtach   - TLSO brace   - repeat CT Cystogram on day of discharge     Signed out to: GABRIEL Moore  Date: 2/10/23  Time: 13:55

## 2023-02-10 NOTE — PROGRESS NOTE ADULT - ASSESSMENT
89y Female  w/ PMHx of dementia, hld, htn seen as  Trauma Alert s/p unwitnessed fall, ?ht, -loc. +eliquis with no complaints, no external signs of trauma . Trauma assessment in ED: ABCs intact , GCS 15 , AAOx3,  ELIZONDO.     Injuries identified:   - LEFT SUPERIOR PUBIC RAMI FX (1.5CM DISPLACEMENT)  -ENDPLATE L3 FRACTURE    PLAN:   - Care per SICU; patient to be downgraded to floors today  - Ortho- WBAT LLE  - Urology reqs appreciated. Repeat CT cysto before d/c  - Neurosurgery - TLSO brace  - Echo 30-35%;   - eliquis restarted  - cefepime for pyleonephritis  - Multimodal pain control  - Physiatry: dispo to [facility]  - PT/Rehab    Trauma/ACS  Spectra [phone]

## 2023-02-11 LAB
ANION GAP SERPL CALC-SCNC: 6 MMOL/L — LOW (ref 7–14)
ANION GAP SERPL CALC-SCNC: 6 MMOL/L — LOW (ref 7–14)
BASOPHILS # BLD AUTO: 0.06 K/UL — SIGNIFICANT CHANGE UP (ref 0–0.2)
BASOPHILS NFR BLD AUTO: 0.6 % — SIGNIFICANT CHANGE UP (ref 0–1)
BUN SERPL-MCNC: 27 MG/DL — HIGH (ref 10–20)
BUN SERPL-MCNC: 29 MG/DL — HIGH (ref 10–20)
CALCIUM SERPL-MCNC: 10 MG/DL — SIGNIFICANT CHANGE UP (ref 8.4–10.5)
CALCIUM SERPL-MCNC: 9.4 MG/DL — SIGNIFICANT CHANGE UP (ref 8.4–10.4)
CHLORIDE SERPL-SCNC: 104 MMOL/L — SIGNIFICANT CHANGE UP (ref 98–110)
CHLORIDE SERPL-SCNC: 105 MMOL/L — SIGNIFICANT CHANGE UP (ref 98–110)
CO2 SERPL-SCNC: 27 MMOL/L — SIGNIFICANT CHANGE UP (ref 17–32)
CO2 SERPL-SCNC: 27 MMOL/L — SIGNIFICANT CHANGE UP (ref 17–32)
CREAT SERPL-MCNC: 1.1 MG/DL — SIGNIFICANT CHANGE UP (ref 0.7–1.5)
CREAT SERPL-MCNC: 1.2 MG/DL — SIGNIFICANT CHANGE UP (ref 0.7–1.5)
EGFR: 43 ML/MIN/1.73M2 — LOW
EGFR: 48 ML/MIN/1.73M2 — LOW
EOSINOPHIL # BLD AUTO: 0.08 K/UL — SIGNIFICANT CHANGE UP (ref 0–0.7)
EOSINOPHIL NFR BLD AUTO: 0.9 % — SIGNIFICANT CHANGE UP (ref 0–8)
GLUCOSE BLDC GLUCOMTR-MCNC: 150 MG/DL — HIGH (ref 70–99)
GLUCOSE BLDC GLUCOMTR-MCNC: 165 MG/DL — HIGH (ref 70–99)
GLUCOSE BLDC GLUCOMTR-MCNC: 173 MG/DL — HIGH (ref 70–99)
GLUCOSE BLDC GLUCOMTR-MCNC: 233 MG/DL — HIGH (ref 70–99)
GLUCOSE SERPL-MCNC: 125 MG/DL — HIGH (ref 70–99)
GLUCOSE SERPL-MCNC: 153 MG/DL — HIGH (ref 70–99)
HCT VFR BLD CALC: 34 % — LOW (ref 37–47)
HCT VFR BLD CALC: 34.5 % — LOW (ref 37–47)
HGB BLD-MCNC: 10.5 G/DL — LOW (ref 12–16)
HGB BLD-MCNC: 10.9 G/DL — LOW (ref 12–16)
IMM GRANULOCYTES NFR BLD AUTO: 0.5 % — HIGH (ref 0.1–0.3)
LYMPHOCYTES # BLD AUTO: 2.51 K/UL — SIGNIFICANT CHANGE UP (ref 1.2–3.4)
LYMPHOCYTES # BLD AUTO: 26.8 % — SIGNIFICANT CHANGE UP (ref 20.5–51.1)
MAGNESIUM SERPL-MCNC: 2.1 MG/DL — SIGNIFICANT CHANGE UP (ref 1.8–2.4)
MAGNESIUM SERPL-MCNC: 2.2 MG/DL — SIGNIFICANT CHANGE UP (ref 1.8–2.4)
MCHC RBC-ENTMCNC: 25.6 PG — LOW (ref 27–31)
MCHC RBC-ENTMCNC: 26.2 PG — LOW (ref 27–31)
MCHC RBC-ENTMCNC: 30.9 G/DL — LOW (ref 32–37)
MCHC RBC-ENTMCNC: 31.6 G/DL — LOW (ref 32–37)
MCV RBC AUTO: 82.9 FL — SIGNIFICANT CHANGE UP (ref 81–99)
MCV RBC AUTO: 82.9 FL — SIGNIFICANT CHANGE UP (ref 81–99)
MONOCYTES # BLD AUTO: 1.21 K/UL — HIGH (ref 0.1–0.6)
MONOCYTES NFR BLD AUTO: 12.9 % — HIGH (ref 1.7–9.3)
NEUTROPHILS # BLD AUTO: 5.45 K/UL — SIGNIFICANT CHANGE UP (ref 1.4–6.5)
NEUTROPHILS NFR BLD AUTO: 58.3 % — SIGNIFICANT CHANGE UP (ref 42.2–75.2)
NRBC # BLD: 0 /100 WBCS — SIGNIFICANT CHANGE UP (ref 0–0)
NRBC # BLD: 0 /100 WBCS — SIGNIFICANT CHANGE UP (ref 0–0)
PHOSPHATE SERPL-MCNC: 2.4 MG/DL — SIGNIFICANT CHANGE UP (ref 2.1–4.9)
PHOSPHATE SERPL-MCNC: 2.7 MG/DL — SIGNIFICANT CHANGE UP (ref 2.1–4.9)
PLATELET # BLD AUTO: 212 K/UL — SIGNIFICANT CHANGE UP (ref 130–400)
PLATELET # BLD AUTO: 215 K/UL — SIGNIFICANT CHANGE UP (ref 130–400)
POTASSIUM SERPL-MCNC: 4.2 MMOL/L — SIGNIFICANT CHANGE UP (ref 3.5–5)
POTASSIUM SERPL-MCNC: 5.4 MMOL/L — HIGH (ref 3.5–5)
POTASSIUM SERPL-SCNC: 4.2 MMOL/L — SIGNIFICANT CHANGE UP (ref 3.5–5)
POTASSIUM SERPL-SCNC: 5.4 MMOL/L — HIGH (ref 3.5–5)
RBC # BLD: 4.1 M/UL — LOW (ref 4.2–5.4)
RBC # BLD: 4.16 M/UL — LOW (ref 4.2–5.4)
RBC # FLD: 21.8 % — HIGH (ref 11.5–14.5)
RBC # FLD: 21.8 % — HIGH (ref 11.5–14.5)
SODIUM SERPL-SCNC: 137 MMOL/L — SIGNIFICANT CHANGE UP (ref 135–146)
SODIUM SERPL-SCNC: 138 MMOL/L — SIGNIFICANT CHANGE UP (ref 135–146)
WBC # BLD: 10.75 K/UL — SIGNIFICANT CHANGE UP (ref 4.8–10.8)
WBC # BLD: 9.36 K/UL — SIGNIFICANT CHANGE UP (ref 4.8–10.8)
WBC # FLD AUTO: 10.75 K/UL — SIGNIFICANT CHANGE UP (ref 4.8–10.8)
WBC # FLD AUTO: 9.36 K/UL — SIGNIFICANT CHANGE UP (ref 4.8–10.8)

## 2023-02-11 PROCEDURE — 99232 SBSQ HOSP IP/OBS MODERATE 35: CPT

## 2023-02-11 PROCEDURE — 71045 X-RAY EXAM CHEST 1 VIEW: CPT | Mod: 26

## 2023-02-11 PROCEDURE — 99233 SBSQ HOSP IP/OBS HIGH 50: CPT | Mod: 24,25

## 2023-02-11 RX ADMIN — Medication 650 MILLIGRAM(S): at 06:28

## 2023-02-11 RX ADMIN — GABAPENTIN 100 MILLIGRAM(S): 400 CAPSULE ORAL at 06:29

## 2023-02-11 RX ADMIN — Medication 650 MILLIGRAM(S): at 17:33

## 2023-02-11 RX ADMIN — MEMANTINE HYDROCHLORIDE 5 MILLIGRAM(S): 10 TABLET ORAL at 11:51

## 2023-02-11 RX ADMIN — MIDODRINE HYDROCHLORIDE 10 MILLIGRAM(S): 2.5 TABLET ORAL at 22:28

## 2023-02-11 RX ADMIN — TRAMADOL HYDROCHLORIDE 25 MILLIGRAM(S): 50 TABLET ORAL at 19:18

## 2023-02-11 RX ADMIN — Medication 2: at 17:32

## 2023-02-11 RX ADMIN — APIXABAN 2.5 MILLIGRAM(S): 2.5 TABLET, FILM COATED ORAL at 17:33

## 2023-02-11 RX ADMIN — DONEPEZIL HYDROCHLORIDE 10 MILLIGRAM(S): 10 TABLET, FILM COATED ORAL at 22:28

## 2023-02-11 RX ADMIN — Medication 25 MICROGRAM(S): at 06:46

## 2023-02-11 RX ADMIN — SENNA PLUS 2 TABLET(S): 8.6 TABLET ORAL at 22:27

## 2023-02-11 RX ADMIN — MIDODRINE HYDROCHLORIDE 10 MILLIGRAM(S): 2.5 TABLET ORAL at 06:28

## 2023-02-11 RX ADMIN — GABAPENTIN 100 MILLIGRAM(S): 400 CAPSULE ORAL at 13:54

## 2023-02-11 RX ADMIN — Medication 4: at 11:50

## 2023-02-11 RX ADMIN — Medication 12.5 MILLIGRAM(S): at 06:28

## 2023-02-11 RX ADMIN — Medication 650 MILLIGRAM(S): at 11:50

## 2023-02-11 RX ADMIN — CHLORHEXIDINE GLUCONATE 1 APPLICATION(S): 213 SOLUTION TOPICAL at 06:30

## 2023-02-11 RX ADMIN — Medication 650 MILLIGRAM(S): at 12:50

## 2023-02-11 RX ADMIN — APIXABAN 2.5 MILLIGRAM(S): 2.5 TABLET, FILM COATED ORAL at 06:29

## 2023-02-11 RX ADMIN — MIDODRINE HYDROCHLORIDE 10 MILLIGRAM(S): 2.5 TABLET ORAL at 13:54

## 2023-02-11 RX ADMIN — GABAPENTIN 100 MILLIGRAM(S): 400 CAPSULE ORAL at 22:28

## 2023-02-11 RX ADMIN — Medication 12.5 MILLIGRAM(S): at 17:33

## 2023-02-11 NOTE — PROGRESS NOTE ADULT - SUBJECTIVE AND OBJECTIVE BOX
GENERAL SURGERY PROGRESS NOTE    Patient: BRIANNE DELACRUZ , 89y ([birth year])Female   MRN: [id number]  Location: [address]  Visit: 02-06-23 Inpatient  Date: 02-11-23 @ 01:56    Events of past 24 hours:  Downgraded to floor  Sleeping comfortably  NAEON    PAST MEDICAL & SURGICAL HISTORY:  HTN (hypertension)    DM (diabetes mellitus)    CAD (coronary artery disease)    Afib    Bladder cancer  in remission    NHL (non-Hodgkin&#x27;s lymphoma)  in remission    HLD (hyperlipidemia)    Glaucoma    Cataract    Hypothyroidism    Congestive heart failure of unknown etiology    Congestive heart failure of unknown etiology    History of total hysterectomy      Vitals:   T(F): 98.4 (02-10-23 @ 20:00), Max: 98.4 (02-10-23 @ 20:00)  HR: 91 (02-10-23 @ 20:00)  BP: 120/58 (02-10-23 @ 20:00)  RR: 20 (02-10-23 @ 20:00)  SpO2: 99% (02-10-23 @ 20:00)      Fluids:     I & O's:    02-09-23 @ 07:01  -  02-10-23 @ 07:00  --------------------------------------------------------  IN:    IV PiggyBack: 100 mL    IV PiggyBack: 100 mL    Lactated Ringers: 1200 mL    Norepinephrine: 4.8 mL    Norepinephrine: 4.8 mL    Norepinephrine: 1.2 mL    Norepinephrine: 6.5 mL    Oral Fluid: 1180 mL  Total IN: 2597.3 mL    OUT:    Indwelling Catheter - Urethral (mL): 1215 mL  Total OUT: 1215 mL    Total NET: 1382.3 mL    PHYSICAL EXAM:  General: NAD, calm and cooperative.  Cardiac: RRR.  Respiratory: On 1L NC. No accessory muscles of respiration in use. Bilateral chest rise.  Abdomen: Soft, non-distended, non-tender, no rebound, no guarding.   Neuro: Moves all extremities.  Skin: Warm/dry, normal color, no jaundice.      MEDICATIONS  (STANDING):  acetaminophen     Tablet .. 650 milliGRAM(s) Oral every 6 hours  apixaban 2.5 milliGRAM(s) Oral every 12 hours  chlorhexidine 2% Cloths 1 Application(s) Topical <User Schedule>  donepezil 10 milliGRAM(s) Oral at bedtime  gabapentin 100 milliGRAM(s) Oral every 8 hours  insulin lispro (ADMELOG) corrective regimen sliding scale   SubCutaneous three times a day before meals  levothyroxine 25 MICROGram(s) Oral daily  memantine 5 milliGRAM(s) Oral daily  metoprolol tartrate 12.5 milliGRAM(s) Oral every 12 hours  midodrine 10 milliGRAM(s) Oral every 8 hours  senna 2 Tablet(s) Oral at bedtime    MEDICATIONS  (PRN):  traMADol 25 milliGRAM(s) Oral every 8 hours PRN Moderate Pain (4 - 6)      DVT PROPHYLAXIS:   GI PROPHYLAXIS:   ANTICOAGULATION:   ANTIBIOTICS:        LAB/STUDIES:  Labs:  CAPILLARY BLOOD GLUCOSE      POCT Blood Glucose.: 136 mg/dL (10 Feb 2023 16:20)  POCT Blood Glucose.: 147 mg/dL (10 Feb 2023 11:13)  POCT Blood Glucose.: 127 mg/dL (10 Feb 2023 06:27)                          10.7   9.63  )-----------( 186      ( 09 Feb 2023 21:45 )             33.1         02-09    132<L>  |  102  |  30<H>  ----------------------------<  201<H>  4.7   |  20  |  1.2    LFTs:      Coags:    CARDIAC MARKERS ( 09 Feb 2023 23:00 )  x     / <0.01 ng/mL / 20 U/L / x     / 2.6 ng/mL      IMAGING:  < from: Xray Chest 1 View- PORTABLE-Routine (02.10.23 @ 06:47) >  Impression:    Chronic bilateral interstitial changes. Developing right basilar   opacity/atelectasis.    < end of copied text >

## 2023-02-11 NOTE — PROGRESS NOTE ADULT - ASSESSMENT
89y Female  w/ PMHx of dementia, hld, htn seen as  Trauma Alert s/p unwitnessed fall, ?ht, -loc. +eliquis with no complaints, no external signs of trauma . Trauma assessment in ED: ABCs intact , GCS 15 , AAOx3,  ELIZONDO.     Injuries identified:   - LEFT SUPERIOR PUBIC RAMI FX (1.5CM DISPLACEMENT)  -ENDPLATE L3 FRACTURE    PLAN:   - Ortho- WBAT LLE  - Urology reqs appreciated. Repeat CT cysto before d/c  - Neurosurgery - TLSO brace  - Echo 30-35%;   - eliquis restarted  - cefepime for pyleonephritis  - Multimodal pain control  - Physiatry: dispo to [facility]  - PT/Rehab    Trauma/ACS  Spectra [phone]

## 2023-02-11 NOTE — PROGRESS NOTE ADULT - SUBJECTIVE AND OBJECTIVE BOX
BRIANNE DELACRUZ  89y  [facility]      Patient is a 89y old  Female who presents with a chief complaint of pelvic fx (08 Feb 2023 12:00)      INTERVAL HPI/OVERNIGHT EVENTS:    no events overnight     REVIEW OF SYSTEMS:  ROS neg   FAMILY HISTORY:  Family history of ischemic heart disease (IHD) (Mother)    Family history of stroke (Mother)    No pertinent past medical history      T(C): 35.1 (02-11-23 @ 09:13), Max: 36.9 (02-10-23 @ 20:00)  HR: 80 (02-11-23 @ 09:13) (74 - 108)  BP: 106/69 (02-11-23 @ 09:13) (98/56 - 146/65)  RR: 18 (02-11-23 @ 09:13) (15 - 20)  SpO2: 96% (02-11-23 @ 09:13) (91% - 100%)  Wt(kg): --Vital Signs Last 24 Hrs  T(C): 35.1 (11 Feb 2023 09:13), Max: 36.9 (10 Feb 2023 20:00)  T(F): 95.1 (11 Feb 2023 09:13), Max: 98.4 (10 Feb 2023 20:00)  HR: 80 (11 Feb 2023 09:13) (74 - 108)  BP: 106/69 (11 Feb 2023 09:13) (98/56 - 146/65)  BP(mean): 84 (10 Feb 2023 20:00) (72 - 96)  RR: 18 (11 Feb 2023 09:13) (15 - 20)  SpO2: 96% (11 Feb 2023 09:13) (91% - 100%)    Parameters below as of 11 Feb 2023 09:13  Patient On (Oxygen Delivery Method): nasal cannula  O2 Flow (L/min): 2      PHYSICAL EXAM:  GENERAL: NAD,   HEAD:  Atraumatic, Normocephalic  EYES: EOMI, PERRLA, conjunctiva and sclera clear  ENMT: No tonsillar erythema, exudates, or enlargement; Moist mucous membranes, Good dentition, No lesions  NECK: Supple, No JVD, Normal thyroid  NERVOUS SYSTEM:  Alert   PULM: Clear to auscultation bilaterally  CARDIAC:s1s2  GI: Soft, Nontender, Nondistended; Bowel sounds present  EXTREMITIES:  2+ Peripheral Pulses, No clubbing, cyanosis, or edema  LYMPH: No lymphadenopathy noted  SKIN: No rashes or lesions    Consultant(s) Notes Reviewed:  [x ] YES  [ ] NO  Care Discussed with Consultants/Other Providers [ x] YES  [ ] NO    LABS:                            10.5   10.75 )-----------( 215      ( 11 Feb 2023 07:01 )             34.0   02-11    138  |  105  |  27<H>  ----------------------------<  125<H>  4.2   |  27  |  1.1    Ca    9.4      11 Feb 2023 07:01  Phos  2.7     02-11  Mg     2.1     02-11              acetaminophen     Tablet .. 650 milliGRAM(s) Oral every 6 hours  apixaban 2.5 milliGRAM(s) Oral every 12 hours  chlorhexidine 2% Cloths 1 Application(s) Topical <User Schedule>  donepezil 10 milliGRAM(s) Oral at bedtime  gabapentin 100 milliGRAM(s) Oral every 8 hours  insulin lispro (ADMELOG) corrective regimen sliding scale   SubCutaneous three times a day before meals  levothyroxine 25 MICROGram(s) Oral daily  memantine 5 milliGRAM(s) Oral daily  metoprolol tartrate 12.5 milliGRAM(s) Oral every 12 hours  midodrine 10 milliGRAM(s) Oral every 8 hours  senna 2 Tablet(s) Oral at bedtime  traMADol 25 milliGRAM(s) Oral every 8 hours PRN      HEALTH ISSUES - PROBLEM Dx:  Contusion of bladder            Case Discussed with House Staff   45 minutes spent on total encounter; more than 50% of the visit was spent counseling and/or coordinating care by the attending physician.   Spectra [phone]

## 2023-02-11 NOTE — PROGRESS NOTE ADULT - ASSESSMENT
HPI:  89yF w/ PMHx of HTN, DM, Dementia seen as  Trauma Alert s/p unwitnessed fall, ?ht, -loc, +eliquis. The patient was in her rocking chair when a family member heard a thud-the patient is unable to describe the events but a family member was at bedside stating she did not have positive head strike. The patient has no external signs of trauma and denies any pain. The patient was found to have pelvic fractures on trauma work up and admitted to the trauma service for rehab purposes. Orthopedics recommends non-op management.  (06 Feb 2023 04:06)    #Fall   PT    #HTN   BP: 106/69 (11 Feb 2023 09:13) (98/56 - 146/65)  controlled    #DM  POCT Blood Glucose.: 233 mg/dL (11 Feb 2023 11:19)  POCT Blood Glucose.: 150 mg/dL (11 Feb 2023 08:02)  POCT Blood Glucose.: 136 mg/dL (10 Feb 2023 16:20)  controlled    #Dementia on donepizil    #RUBY on CKD 3 Creatinine Trend: 1.1<--, 1.2<--, 1.5<--, 1.6<--, 1.5<--, 1.6<-- resolved     #Euvolemic cardiomyopathy    #Afib, HR at 100+/- partly due to hypotension resolved , rate controlled , continue with eliquis     #Post fall, trauma, L3 & pubic Fx, but no bleeding    #NSVT episodes no intervention per cardio

## 2023-02-12 LAB
ANION GAP SERPL CALC-SCNC: 8 MMOL/L — SIGNIFICANT CHANGE UP (ref 7–14)
BASOPHILS # BLD AUTO: 0.1 K/UL — SIGNIFICANT CHANGE UP (ref 0–0.2)
BASOPHILS NFR BLD AUTO: 1 % — SIGNIFICANT CHANGE UP (ref 0–1)
BUN SERPL-MCNC: 28 MG/DL — HIGH (ref 10–20)
CALCIUM SERPL-MCNC: 10.7 MG/DL — HIGH (ref 8.4–10.5)
CHLORIDE SERPL-SCNC: 104 MMOL/L — SIGNIFICANT CHANGE UP (ref 98–110)
CO2 SERPL-SCNC: 24 MMOL/L — SIGNIFICANT CHANGE UP (ref 17–32)
CREAT SERPL-MCNC: 1.1 MG/DL — SIGNIFICANT CHANGE UP (ref 0.7–1.5)
EGFR: 48 ML/MIN/1.73M2 — LOW
EOSINOPHIL # BLD AUTO: 0.51 K/UL — SIGNIFICANT CHANGE UP (ref 0–0.7)
EOSINOPHIL NFR BLD AUTO: 5 % — SIGNIFICANT CHANGE UP (ref 0–8)
GLUCOSE BLDC GLUCOMTR-MCNC: 112 MG/DL — HIGH (ref 70–99)
GLUCOSE BLDC GLUCOMTR-MCNC: 118 MG/DL — HIGH (ref 70–99)
GLUCOSE BLDC GLUCOMTR-MCNC: 133 MG/DL — HIGH (ref 70–99)
GLUCOSE BLDC GLUCOMTR-MCNC: 139 MG/DL — HIGH (ref 70–99)
GLUCOSE BLDC GLUCOMTR-MCNC: 208 MG/DL — HIGH (ref 70–99)
GLUCOSE SERPL-MCNC: 98 MG/DL — SIGNIFICANT CHANGE UP (ref 70–99)
HCT VFR BLD CALC: 36.1 % — LOW (ref 37–47)
HGB BLD-MCNC: 11.2 G/DL — LOW (ref 12–16)
IMM GRANULOCYTES NFR BLD AUTO: 0.3 % — SIGNIFICANT CHANGE UP (ref 0.1–0.3)
LYMPHOCYTES # BLD AUTO: 3.68 K/UL — HIGH (ref 1.2–3.4)
LYMPHOCYTES # BLD AUTO: 35.7 % — SIGNIFICANT CHANGE UP (ref 20.5–51.1)
MCHC RBC-ENTMCNC: 26.2 PG — LOW (ref 27–31)
MCHC RBC-ENTMCNC: 31 G/DL — LOW (ref 32–37)
MCV RBC AUTO: 84.5 FL — SIGNIFICANT CHANGE UP (ref 81–99)
MONOCYTES # BLD AUTO: 1.58 K/UL — HIGH (ref 0.1–0.6)
MONOCYTES NFR BLD AUTO: 15.3 % — HIGH (ref 1.7–9.3)
NEUTROPHILS # BLD AUTO: 4.4 K/UL — SIGNIFICANT CHANGE UP (ref 1.4–6.5)
NEUTROPHILS NFR BLD AUTO: 42.7 % — SIGNIFICANT CHANGE UP (ref 42.2–75.2)
NRBC # BLD: 0 /100 WBCS — SIGNIFICANT CHANGE UP (ref 0–0)
PLATELET # BLD AUTO: 197 K/UL — SIGNIFICANT CHANGE UP (ref 130–400)
POTASSIUM SERPL-MCNC: 4.9 MMOL/L — SIGNIFICANT CHANGE UP (ref 3.5–5)
POTASSIUM SERPL-SCNC: 4.9 MMOL/L — SIGNIFICANT CHANGE UP (ref 3.5–5)
RBC # BLD: 4.27 M/UL — SIGNIFICANT CHANGE UP (ref 4.2–5.4)
RBC # FLD: 22.4 % — HIGH (ref 11.5–14.5)
SODIUM SERPL-SCNC: 136 MMOL/L — SIGNIFICANT CHANGE UP (ref 135–146)
WBC # BLD: 10.3 K/UL — SIGNIFICANT CHANGE UP (ref 4.8–10.8)
WBC # FLD AUTO: 10.3 K/UL — SIGNIFICANT CHANGE UP (ref 4.8–10.8)

## 2023-02-12 PROCEDURE — 93010 ELECTROCARDIOGRAM REPORT: CPT

## 2023-02-12 PROCEDURE — 74176 CT ABD & PELVIS W/O CONTRAST: CPT | Mod: 26

## 2023-02-12 PROCEDURE — 99232 SBSQ HOSP IP/OBS MODERATE 35: CPT

## 2023-02-12 RX ORDER — CALCIUM GLUCONATE 100 MG/ML
2 VIAL (ML) INTRAVENOUS ONCE
Refills: 0 | Status: COMPLETED | OUTPATIENT
Start: 2023-02-12 | End: 2023-02-12

## 2023-02-12 RX ORDER — INSULIN HUMAN 100 [IU]/ML
10 INJECTION, SOLUTION SUBCUTANEOUS ONCE
Refills: 0 | Status: COMPLETED | OUTPATIENT
Start: 2023-02-12 | End: 2023-02-12

## 2023-02-12 RX ORDER — ONDANSETRON 8 MG/1
4 TABLET, FILM COATED ORAL ONCE
Refills: 0 | Status: COMPLETED | OUTPATIENT
Start: 2023-02-12 | End: 2023-02-12

## 2023-02-12 RX ORDER — DEXTROSE 50 % IN WATER 50 %
50 SYRINGE (ML) INTRAVENOUS ONCE
Refills: 0 | Status: COMPLETED | OUTPATIENT
Start: 2023-02-12 | End: 2023-02-12

## 2023-02-12 RX ADMIN — MEMANTINE HYDROCHLORIDE 5 MILLIGRAM(S): 10 TABLET ORAL at 11:48

## 2023-02-12 RX ADMIN — Medication 650 MILLIGRAM(S): at 11:48

## 2023-02-12 RX ADMIN — SENNA PLUS 2 TABLET(S): 8.6 TABLET ORAL at 22:12

## 2023-02-12 RX ADMIN — Medication 4: at 11:48

## 2023-02-12 RX ADMIN — APIXABAN 2.5 MILLIGRAM(S): 2.5 TABLET, FILM COATED ORAL at 05:51

## 2023-02-12 RX ADMIN — Medication 63.75 MILLIMOLE(S): at 23:16

## 2023-02-12 RX ADMIN — DONEPEZIL HYDROCHLORIDE 10 MILLIGRAM(S): 10 TABLET, FILM COATED ORAL at 22:12

## 2023-02-12 RX ADMIN — Medication 650 MILLIGRAM(S): at 12:48

## 2023-02-12 RX ADMIN — INSULIN HUMAN 10 UNIT(S): 100 INJECTION, SOLUTION SUBCUTANEOUS at 03:33

## 2023-02-12 RX ADMIN — CHLORHEXIDINE GLUCONATE 1 APPLICATION(S): 213 SOLUTION TOPICAL at 05:39

## 2023-02-12 RX ADMIN — Medication 650 MILLIGRAM(S): at 05:50

## 2023-02-12 RX ADMIN — Medication 200 GRAM(S): at 03:26

## 2023-02-12 RX ADMIN — GABAPENTIN 100 MILLIGRAM(S): 400 CAPSULE ORAL at 22:11

## 2023-02-12 RX ADMIN — Medication 650 MILLIGRAM(S): at 18:33

## 2023-02-12 RX ADMIN — ONDANSETRON 4 MILLIGRAM(S): 8 TABLET, FILM COATED ORAL at 05:53

## 2023-02-12 RX ADMIN — Medication 12.5 MILLIGRAM(S): at 05:50

## 2023-02-12 RX ADMIN — GABAPENTIN 100 MILLIGRAM(S): 400 CAPSULE ORAL at 13:09

## 2023-02-12 RX ADMIN — MIDODRINE HYDROCHLORIDE 10 MILLIGRAM(S): 2.5 TABLET ORAL at 05:51

## 2023-02-12 RX ADMIN — Medication 650 MILLIGRAM(S): at 17:33

## 2023-02-12 RX ADMIN — Medication 12.5 MILLIGRAM(S): at 17:33

## 2023-02-12 RX ADMIN — Medication 650 MILLIGRAM(S): at 00:22

## 2023-02-12 RX ADMIN — TRAMADOL HYDROCHLORIDE 25 MILLIGRAM(S): 50 TABLET ORAL at 11:51

## 2023-02-12 RX ADMIN — MIDODRINE HYDROCHLORIDE 10 MILLIGRAM(S): 2.5 TABLET ORAL at 22:11

## 2023-02-12 RX ADMIN — Medication 25 MICROGRAM(S): at 05:49

## 2023-02-12 RX ADMIN — Medication 50 MILLILITER(S): at 03:28

## 2023-02-12 RX ADMIN — TRAMADOL HYDROCHLORIDE 25 MILLIGRAM(S): 50 TABLET ORAL at 10:51

## 2023-02-12 RX ADMIN — Medication 650 MILLIGRAM(S): at 07:24

## 2023-02-12 RX ADMIN — APIXABAN 2.5 MILLIGRAM(S): 2.5 TABLET, FILM COATED ORAL at 17:33

## 2023-02-12 RX ADMIN — GABAPENTIN 100 MILLIGRAM(S): 400 CAPSULE ORAL at 05:50

## 2023-02-12 RX ADMIN — MIDODRINE HYDROCHLORIDE 10 MILLIGRAM(S): 2.5 TABLET ORAL at 13:10

## 2023-02-12 NOTE — PROGRESS NOTE ADULT - ASSESSMENT
HPI:  89yF w/ PMHx of HTN, DM, Dementia seen as  Trauma Alert s/p unwitnessed fall, ?ht, -loc, +eliquis. The patient was in her rocking chair when a family member heard a thud-the patient is unable to describe the events but a family member was at bedside stating she did not have positive head strike. The patient has no external signs of trauma and denies any pain. The patient was found to have pelvic fractures on trauma work up and admitted to the trauma service for rehab purposes. Orthopedics recommends non-op management.  (06 Feb 2023 04:06)    #Fall   PT    #HTN   BP: 124/66 (12 Feb 2023 08:33) (124/66 - 137/83)  controlled    #DM  POCT Blood Glucose.: 112 mg/dL (12 Feb 2023 07:40)  POCT Blood Glucose.: 118 mg/dL (12 Feb 2023 03:31)  POCT Blood Glucose.: 165 mg/dL (11 Feb 2023 21:44)  POCT Blood Glucose.: 173 mg/dL (11 Feb 2023 16:32)  POCT Blood Glucose.: 233 mg/dL (11 Feb 2023 11:19)  controlled    #Dementia on donepizil    #RUBY on CKD 3 Creatinine Trend: 1.1<--, 1.2<--, 1.1<--, 1.2<--, 1.5<--, 1.6<-- resolved    #Euvolemic cardiomyopathy    #Afib, HR at 100+/- partly due to hypotension resolved , rate controlled , continue with eliquis     #Post fall, trauma, L3 & pubic Fx, but no bleeding    #NSVT episodes no intervention per cardio     Would recommend trial of void     Spoke to daughter  at 8:30 AM

## 2023-02-12 NOTE — PROGRESS NOTE ADULT - ASSESSMENT
Diagnosis: Fall on AC with pelvic fracture                  Hypotension due to hypovolemia     Plan:	  - supportive care  - pain management  - incentive spirometer   - continue diet  - DVT prophylaxis       [ x] SCDs [ ] Lovenox [x ] Heparins SQ  [ ] None  - GI prophylaxis  - follow up consults, cardiology   - repeat studies as needed  - PT evaluation and follow up  - replace electrolytes  - case management evaluation

## 2023-02-12 NOTE — PROGRESS NOTE ADULT - SUBJECTIVE AND OBJECTIVE BOX
GENERAL SURGERY PROGRESS NOTE    Patient: BRIANNE DELACRUZ , 89y ([birth year])Female   MRN: [id number]  Location: [address]  Visit: 02-06-23 Inpatient  Date: 02-12-23 @ 07:01    Events of past 24 hours:  Patient seen and examined at bedside  Patient has not had BM, no flatus  Patient reports nausea, and one episode of spit up overnight, zofran ordered prn  Patient hyperkalemia treated    PAST MEDICAL & SURGICAL HISTORY:  HTN (hypertension)      DM (diabetes mellitus)      CAD (coronary artery disease)      Afib      Bladder cancer  in remission      NHL (non-Hodgkin&#x27;s lymphoma)  in remission      HLD (hyperlipidemia)      Glaucoma      Cataract      Hypothyroidism      Congestive heart failure of unknown etiology      Congestive heart failure of unknown etiology      History of total hysterectomy          Vitals:   T(F): 97.1 (02-11-23 @ 23:28), Max: 97.1 (02-11-23 @ 23:28)  HR: 63 (02-11-23 @ 23:28)  BP: 137/83 (02-11-23 @ 23:28)  RR: 18 (02-11-23 @ 23:28)  SpO2: 98% (02-11-23 @ 23:28)          Fluids:     I & O's:    02-11-23 @ 07:01  -  02-12-23 @ 07:00  --------------------------------------------------------  IN:    Oral Fluid: 420 mL  Total IN: 420 mL    OUT:    Indwelling Catheter - Urethral (mL): 1200 mL  Total OUT: 1200 mL    Total NET: -780 mL    PHYSICAL EXAM:  General: NAD, calm and cooperative.  Cardiac: RRR.  Respiratory: On 1L NC. No accessory muscles of respiration in use. Bilateral chest rise.  Abdomen: Soft, non-distended, non-tender, no rebound, no guarding.   Neuro: Moves all extremities.  Skin: Warm/dry, normal color, no jaundice.    MEDICATIONS  (STANDING):  acetaminophen     Tablet .. 650 milliGRAM(s) Oral every 6 hours  apixaban 2.5 milliGRAM(s) Oral every 12 hours  chlorhexidine 2% Cloths 1 Application(s) Topical <User Schedule>  donepezil 10 milliGRAM(s) Oral at bedtime  gabapentin 100 milliGRAM(s) Oral every 8 hours  insulin lispro (ADMELOG) corrective regimen sliding scale   SubCutaneous three times a day before meals  levothyroxine 25 MICROGram(s) Oral daily  memantine 5 milliGRAM(s) Oral daily  metoprolol tartrate 12.5 milliGRAM(s) Oral every 12 hours  midodrine 10 milliGRAM(s) Oral every 8 hours  senna 2 Tablet(s) Oral at bedtime  sodium phosphate 15 milliMole(s)/250 mL IVPB 15 milliMole(s) IV Intermittent once    MEDICATIONS  (PRN):  traMADol 25 milliGRAM(s) Oral every 8 hours PRN Moderate Pain (4 - 6)      DVT PROPHYLAXIS:   GI PROPHYLAXIS:   ANTICOAGULATION:   ANTIBIOTICS:            LAB/STUDIES:  Labs:  CAPILLARY BLOOD GLUCOSE      POCT Blood Glucose.: 118 mg/dL (12 Feb 2023 03:31)  POCT Blood Glucose.: 165 mg/dL (11 Feb 2023 21:44)  POCT Blood Glucose.: 173 mg/dL (11 Feb 2023 16:32)  POCT Blood Glucose.: 233 mg/dL (11 Feb 2023 11:19)  POCT Blood Glucose.: 150 mg/dL (11 Feb 2023 08:02)                          10.9   9.36  )-----------( 212      ( 11 Feb 2023 22:14 )             34.5       Auto Neutrophil %: 58.3 % (02-11-23 @ 22:14)  Auto Immature Granulocyte %: 0.5 % (02-11-23 @ 22:14)    02-11    137  |  104  |  29<H>  ----------------------------<  153<H>  5.4<H>   |  27  |  1.2      Calcium, Total Serum: 10.0 mg/dL (02-11-23 @ 22:14)      LFTs:         Coags:

## 2023-02-12 NOTE — PROGRESS NOTE ADULT - SUBJECTIVE AND OBJECTIVE BOX
BRIANNE DELACRUZ  89y  [facility]      Patient is a 89y old  Female who presents with a chief complaint of pelvic fx (08 Feb 2023 12:00)      INTERVAL HPI/OVERNIGHT EVENTS:    no acute events overnight     REVIEW OF SYSTEMS:  ROS neg   FAMILY HISTORY:  Family history of ischemic heart disease (IHD) (Mother)    Family history of stroke (Mother)    No pertinent past medical history      T(C): 35 (02-12-23 @ 08:33), Max: 36.2 (02-11-23 @ 23:28)  HR: 73 (02-12-23 @ 08:33) (63 - 88)  BP: 124/66 (02-12-23 @ 08:33) (124/66 - 137/83)  RR: 18 (02-12-23 @ 08:33) (18 - 18)  SpO2: 97% (02-12-23 @ 08:33) (97% - 100%)  Wt(kg): --Vital Signs Last 24 Hrs  T(C): 35 (12 Feb 2023 08:33), Max: 36.2 (11 Feb 2023 23:28)  T(F): 95 (12 Feb 2023 08:33), Max: 97.1 (11 Feb 2023 23:28)  HR: 73 (12 Feb 2023 08:33) (63 - 88)  BP: 124/66 (12 Feb 2023 08:33) (124/66 - 137/83)  BP(mean): --  RR: 18 (12 Feb 2023 08:33) (18 - 18)  SpO2: 97% (12 Feb 2023 08:33) (97% - 100%)    Parameters below as of 12 Feb 2023 08:33  Patient On (Oxygen Delivery Method): nasal cannula        PHYSICAL EXAM:  GENERAL: NAD, well-groomed, well-developed  HEAD:  Atraumatic, Normocephalic  EYES: EOMI, PERRLA, conjunctiva and sclera clear  ENMT: No tonsillar erythema, exudates, or enlargement; Moist mucous membranes, Good dentition, No lesions  NECK: Supple, No JVD, Normal thyroid  NERVOUS SYSTEM:  Alert   PULM: Clear to auscultation bilaterally  CARDIAC: Regular rate and rhythm; No murmurs, rubs, or gallops  GI: Soft, Nontender, Nondistended; Bowel sounds present  EXTREMITIES:  2+ Peripheral Pulses, No clubbing, cyanosis, or edema  LYMPH: No lymphadenopathy noted  SKIN: No rashes or lesions    Consultant(s) Notes Reviewed:  [x ] YES  [ ] NO  Care Discussed with Consultants/Other Providers [ x] YES  [ ] NO    LABS:                            10.9   9.36  )-----------( 212      ( 11 Feb 2023 22:14 )             34.5   02-12    136  |  104  |  28<H>  ----------------------------<  98  4.9   |  24  |  1.1    Ca    10.7<H>      12 Feb 2023 08:27  Phos  2.4     02-11  Mg     2.2     02-11              acetaminophen     Tablet .. 650 milliGRAM(s) Oral every 6 hours  apixaban 2.5 milliGRAM(s) Oral every 12 hours  chlorhexidine 2% Cloths 1 Application(s) Topical <User Schedule>  donepezil 10 milliGRAM(s) Oral at bedtime  gabapentin 100 milliGRAM(s) Oral every 8 hours  insulin lispro (ADMELOG) corrective regimen sliding scale   SubCutaneous three times a day before meals  levothyroxine 25 MICROGram(s) Oral daily  memantine 5 milliGRAM(s) Oral daily  metoprolol tartrate 12.5 milliGRAM(s) Oral every 12 hours  midodrine 10 milliGRAM(s) Oral every 8 hours  senna 2 Tablet(s) Oral at bedtime  sodium phosphate 15 milliMole(s)/250 mL IVPB 15 milliMole(s) IV Intermittent once  traMADol 25 milliGRAM(s) Oral every 8 hours PRN      HEALTH ISSUES - PROBLEM Dx:  Contusion of bladder            Case Discussed with House Staff   Spectra [phone]

## 2023-02-13 ENCOUNTER — TRANSCRIPTION ENCOUNTER (OUTPATIENT)
Age: 88
End: 2023-02-13

## 2023-02-13 LAB
ANION GAP SERPL CALC-SCNC: 10 MMOL/L — SIGNIFICANT CHANGE UP (ref 7–14)
ANION GAP SERPL CALC-SCNC: 11 MMOL/L — SIGNIFICANT CHANGE UP (ref 7–14)
ANION GAP SERPL CALC-SCNC: 16 MMOL/L — HIGH (ref 7–14)
ANION GAP SERPL CALC-SCNC: 26 MMOL/L — HIGH (ref 7–14)
BASOPHILS # BLD AUTO: 0.08 K/UL — SIGNIFICANT CHANGE UP (ref 0–0.2)
BASOPHILS NFR BLD AUTO: 0.8 % — SIGNIFICANT CHANGE UP (ref 0–1)
BUN SERPL-MCNC: 24 MG/DL — HIGH (ref 10–20)
BUN SERPL-MCNC: 28 MG/DL — HIGH (ref 10–20)
BUN SERPL-MCNC: 29 MG/DL — HIGH (ref 10–20)
BUN SERPL-MCNC: 30 MG/DL — HIGH (ref 10–20)
CALCIUM SERPL-MCNC: 10.1 MG/DL — SIGNIFICANT CHANGE UP (ref 8.4–10.5)
CALCIUM SERPL-MCNC: 9.2 MG/DL — SIGNIFICANT CHANGE UP (ref 8.4–10.5)
CALCIUM SERPL-MCNC: 9.9 MG/DL — SIGNIFICANT CHANGE UP (ref 8.4–10.5)
CALCIUM SERPL-MCNC: 9.9 MG/DL — SIGNIFICANT CHANGE UP (ref 8.4–10.5)
CHLORIDE SERPL-SCNC: 100 MMOL/L — SIGNIFICANT CHANGE UP (ref 98–110)
CHLORIDE SERPL-SCNC: 101 MMOL/L — SIGNIFICANT CHANGE UP (ref 98–110)
CHLORIDE SERPL-SCNC: 101 MMOL/L — SIGNIFICANT CHANGE UP (ref 98–110)
CHLORIDE SERPL-SCNC: 84 MMOL/L — LOW (ref 98–110)
CO2 SERPL-SCNC: 17 MMOL/L — SIGNIFICANT CHANGE UP (ref 17–32)
CO2 SERPL-SCNC: 21 MMOL/L — SIGNIFICANT CHANGE UP (ref 17–32)
CO2 SERPL-SCNC: 21 MMOL/L — SIGNIFICANT CHANGE UP (ref 17–32)
CO2 SERPL-SCNC: 23 MMOL/L — SIGNIFICANT CHANGE UP (ref 17–32)
CREAT SERPL-MCNC: 1.1 MG/DL — SIGNIFICANT CHANGE UP (ref 0.7–1.5)
CREAT SERPL-MCNC: 1.1 MG/DL — SIGNIFICANT CHANGE UP (ref 0.7–1.5)
CREAT SERPL-MCNC: 1.2 MG/DL — SIGNIFICANT CHANGE UP (ref 0.7–1.5)
CREAT SERPL-MCNC: 1.3 MG/DL — SIGNIFICANT CHANGE UP (ref 0.7–1.5)
EGFR: 39 ML/MIN/1.73M2 — LOW
EGFR: 43 ML/MIN/1.73M2 — LOW
EGFR: 48 ML/MIN/1.73M2 — LOW
EGFR: 48 ML/MIN/1.73M2 — LOW
EOSINOPHIL # BLD AUTO: 0.49 K/UL — SIGNIFICANT CHANGE UP (ref 0–0.7)
EOSINOPHIL NFR BLD AUTO: 5.1 % — SIGNIFICANT CHANGE UP (ref 0–8)
GLUCOSE BLDC GLUCOMTR-MCNC: 104 MG/DL — HIGH (ref 70–99)
GLUCOSE BLDC GLUCOMTR-MCNC: 135 MG/DL — HIGH (ref 70–99)
GLUCOSE BLDC GLUCOMTR-MCNC: 147 MG/DL — HIGH (ref 70–99)
GLUCOSE BLDC GLUCOMTR-MCNC: 171 MG/DL — HIGH (ref 70–99)
GLUCOSE SERPL-MCNC: 119 MG/DL — HIGH (ref 70–99)
GLUCOSE SERPL-MCNC: 126 MG/DL — HIGH (ref 70–99)
GLUCOSE SERPL-MCNC: 135 MG/DL — HIGH (ref 70–99)
GLUCOSE SERPL-MCNC: 632 MG/DL — CRITICAL HIGH (ref 70–99)
HCT VFR BLD CALC: 36.4 % — LOW (ref 37–47)
HGB BLD-MCNC: 11.4 G/DL — LOW (ref 12–16)
IMM GRANULOCYTES NFR BLD AUTO: 0.4 % — HIGH (ref 0.1–0.3)
LYMPHOCYTES # BLD AUTO: 3.74 K/UL — HIGH (ref 1.2–3.4)
LYMPHOCYTES # BLD AUTO: 38.8 % — SIGNIFICANT CHANGE UP (ref 20.5–51.1)
MAGNESIUM SERPL-MCNC: 2 MG/DL — SIGNIFICANT CHANGE UP (ref 1.8–2.4)
MAGNESIUM SERPL-MCNC: 2.2 MG/DL — SIGNIFICANT CHANGE UP (ref 1.8–2.4)
MCHC RBC-ENTMCNC: 26.2 PG — LOW (ref 27–31)
MCHC RBC-ENTMCNC: 31.3 G/DL — LOW (ref 32–37)
MCV RBC AUTO: 83.7 FL — SIGNIFICANT CHANGE UP (ref 81–99)
MONOCYTES # BLD AUTO: 1.65 K/UL — HIGH (ref 0.1–0.6)
MONOCYTES NFR BLD AUTO: 17.1 % — HIGH (ref 1.7–9.3)
NEUTROPHILS # BLD AUTO: 3.63 K/UL — SIGNIFICANT CHANGE UP (ref 1.4–6.5)
NEUTROPHILS NFR BLD AUTO: 37.8 % — LOW (ref 42.2–75.2)
NRBC # BLD: 0 /100 WBCS — SIGNIFICANT CHANGE UP (ref 0–0)
PHOSPHATE SERPL-MCNC: 3.5 MG/DL — SIGNIFICANT CHANGE UP (ref 2.1–4.9)
PHOSPHATE SERPL-MCNC: 3.7 MG/DL — SIGNIFICANT CHANGE UP (ref 2.1–4.9)
PLATELET # BLD AUTO: 241 K/UL — SIGNIFICANT CHANGE UP (ref 130–400)
POTASSIUM SERPL-MCNC: 5.3 MMOL/L — HIGH (ref 3.5–5)
POTASSIUM SERPL-MCNC: 5.3 MMOL/L — HIGH (ref 3.5–5)
POTASSIUM SERPL-MCNC: 5.4 MMOL/L — HIGH (ref 3.5–5)
POTASSIUM SERPL-MCNC: 6.9 MMOL/L — CRITICAL HIGH (ref 3.5–5)
POTASSIUM SERPL-SCNC: 5.3 MMOL/L — HIGH (ref 3.5–5)
POTASSIUM SERPL-SCNC: 5.3 MMOL/L — HIGH (ref 3.5–5)
POTASSIUM SERPL-SCNC: 5.4 MMOL/L — HIGH (ref 3.5–5)
POTASSIUM SERPL-SCNC: 6.9 MMOL/L — CRITICAL HIGH (ref 3.5–5)
RBC # BLD: 4.35 M/UL — SIGNIFICANT CHANGE UP (ref 4.2–5.4)
RBC # FLD: 21.8 % — HIGH (ref 11.5–14.5)
SODIUM SERPL-SCNC: 131 MMOL/L — LOW (ref 135–146)
SODIUM SERPL-SCNC: 132 MMOL/L — LOW (ref 135–146)
SODIUM SERPL-SCNC: 134 MMOL/L — LOW (ref 135–146)
SODIUM SERPL-SCNC: 134 MMOL/L — LOW (ref 135–146)
WBC # BLD: 9.63 K/UL — SIGNIFICANT CHANGE UP (ref 4.8–10.8)
WBC # FLD AUTO: 9.63 K/UL — SIGNIFICANT CHANGE UP (ref 4.8–10.8)

## 2023-02-13 PROCEDURE — 99232 SBSQ HOSP IP/OBS MODERATE 35: CPT

## 2023-02-13 PROCEDURE — 99232 SBSQ HOSP IP/OBS MODERATE 35: CPT | Mod: 24,25

## 2023-02-13 RX ORDER — ACETAMINOPHEN 500 MG
2 TABLET ORAL
Qty: 0 | Refills: 0 | DISCHARGE
Start: 2023-02-13

## 2023-02-13 RX ORDER — MIDODRINE HYDROCHLORIDE 2.5 MG/1
1 TABLET ORAL
Qty: 0 | Refills: 0 | DISCHARGE
Start: 2023-02-13

## 2023-02-13 RX ORDER — MIDODRINE HYDROCHLORIDE 2.5 MG/1
1 TABLET ORAL
Qty: 42 | Refills: 0
Start: 2023-02-13 | End: 2023-02-26

## 2023-02-13 RX ADMIN — CHLORHEXIDINE GLUCONATE 1 APPLICATION(S): 213 SOLUTION TOPICAL at 06:29

## 2023-02-13 RX ADMIN — Medication 25 MICROGRAM(S): at 05:51

## 2023-02-13 RX ADMIN — GABAPENTIN 100 MILLIGRAM(S): 400 CAPSULE ORAL at 21:37

## 2023-02-13 RX ADMIN — Medication 650 MILLIGRAM(S): at 00:35

## 2023-02-13 RX ADMIN — MIDODRINE HYDROCHLORIDE 10 MILLIGRAM(S): 2.5 TABLET ORAL at 21:36

## 2023-02-13 RX ADMIN — Medication 650 MILLIGRAM(S): at 11:48

## 2023-02-13 RX ADMIN — Medication 650 MILLIGRAM(S): at 17:34

## 2023-02-13 RX ADMIN — MIDODRINE HYDROCHLORIDE 10 MILLIGRAM(S): 2.5 TABLET ORAL at 05:51

## 2023-02-13 RX ADMIN — APIXABAN 2.5 MILLIGRAM(S): 2.5 TABLET, FILM COATED ORAL at 17:34

## 2023-02-13 RX ADMIN — SENNA PLUS 2 TABLET(S): 8.6 TABLET ORAL at 21:37

## 2023-02-13 RX ADMIN — DONEPEZIL HYDROCHLORIDE 10 MILLIGRAM(S): 10 TABLET, FILM COATED ORAL at 21:39

## 2023-02-13 RX ADMIN — MIDODRINE HYDROCHLORIDE 10 MILLIGRAM(S): 2.5 TABLET ORAL at 15:13

## 2023-02-13 RX ADMIN — GABAPENTIN 100 MILLIGRAM(S): 400 CAPSULE ORAL at 05:51

## 2023-02-13 RX ADMIN — GABAPENTIN 100 MILLIGRAM(S): 400 CAPSULE ORAL at 15:13

## 2023-02-13 RX ADMIN — APIXABAN 2.5 MILLIGRAM(S): 2.5 TABLET, FILM COATED ORAL at 05:50

## 2023-02-13 RX ADMIN — MEMANTINE HYDROCHLORIDE 5 MILLIGRAM(S): 10 TABLET ORAL at 11:48

## 2023-02-13 RX ADMIN — Medication 650 MILLIGRAM(S): at 05:50

## 2023-02-13 RX ADMIN — Medication 2: at 17:39

## 2023-02-13 RX ADMIN — Medication 12.5 MILLIGRAM(S): at 05:54

## 2023-02-13 NOTE — PROGRESS NOTE ADULT - SUBJECTIVE AND OBJECTIVE BOX
BRIANNE DELACRUZ  89y  [facility]      Patient is a 89y old  Female who presents with a chief complaint of s/p unwitnessed fall (13 Feb 2023 12:11)      INTERVAL HPI/OVERNIGHT EVENTS:     no acute events overnight     REVIEW OF SYSTEMS:  ROS neg   FAMILY HISTORY:  Family history of ischemic heart disease (IHD) (Mother)    Family history of stroke (Mother)    No pertinent past medical history      T(C): 36.7 (02-13-23 @ 08:54), Max: 36.7 (02-13-23 @ 01:11)  HR: 72 (02-13-23 @ 08:54) (72 - 99)  BP: 102/61 (02-13-23 @ 08:54) (102/61 - 121/74)  RR: 18 (02-13-23 @ 08:54) (18 - 18)  SpO2: 96% (02-13-23 @ 01:11) (96% - 99%)  Wt(kg): --Vital Signs Last 24 Hrs  T(C): 36.7 (13 Feb 2023 08:54), Max: 36.7 (13 Feb 2023 01:11)  T(F): 98 (13 Feb 2023 08:54), Max: 98.1 (13 Feb 2023 01:11)  HR: 72 (13 Feb 2023 08:54) (72 - 99)  BP: 102/61 (13 Feb 2023 08:54) (102/61 - 121/74)  BP(mean): --  RR: 18 (13 Feb 2023 08:54) (18 - 18)  SpO2: 96% (13 Feb 2023 01:11) (96% - 99%)    Parameters below as of 13 Feb 2023 01:11    O2 Flow (L/min): 2      PHYSICAL EXAM:  GENERAL: NAD, well-groomed, well-developed  HEAD:  Atraumatic, Normocephalic  EYES: EOMI, PERRLA, conjunctiva and sclera clear  ENMT: No tonsillar erythema, exudates, or enlargement; Moist mucous membranes, Good dentition, No lesions  NECK: Supple, No JVD, Normal thyroid  PULM: Clear to auscultation bilaterally  CARDIAC:s1s2  GI: Soft, Nontender, Nondistended; Bowel sounds present  EXTREMITIES:  2+ Peripheral Pulses, No clubbing, cyanosis, or edema  LYMPH: No lymphadenopathy noted  SKIN: No rashes or lesions    Consultant(s) Notes Reviewed:  [x ] YES  [ ] NO  Care Discussed with Consultants/Other Providers [ x] YES  [ ] NO    LABS:                            11.2   10.30 )-----------( 197      ( 12 Feb 2023 21:53 )             36.1   02-13    131<L>  |  84<L>  |  24<H>  ----------------------------<  632<HH>  5.3<H>   |  21  |  1.1    Ca    9.2      13 Feb 2023 01:10  Phos  3.7     02-12  Mg     2.2     02-12              acetaminophen     Tablet .. 650 milliGRAM(s) Oral every 6 hours  apixaban 2.5 milliGRAM(s) Oral every 12 hours  chlorhexidine 2% Cloths 1 Application(s) Topical <User Schedule>  donepezil 10 milliGRAM(s) Oral at bedtime  gabapentin 100 milliGRAM(s) Oral every 8 hours  insulin lispro (ADMELOG) corrective regimen sliding scale   SubCutaneous three times a day before meals  levothyroxine 25 MICROGram(s) Oral daily  memantine 5 milliGRAM(s) Oral daily  metoprolol tartrate 12.5 milliGRAM(s) Oral every 12 hours  midodrine 10 milliGRAM(s) Oral every 8 hours  senna 2 Tablet(s) Oral at bedtime  traMADol 25 milliGRAM(s) Oral every 8 hours PRN      HEALTH ISSUES - PROBLEM Dx:  Contusion of bladder            Case Discussed with House Staff     Spectra [phone]

## 2023-02-13 NOTE — DISCHARGE NOTE PROVIDER - PROVIDER TOKENS
PROVIDER:[TOKEN:[[id number]],FOLLOWUP:[2 weeks]] PROVIDER:[TOKEN:[[id number]],FOLLOWUP:[1 week]],PROVIDER:[TOKEN:[[id number]],FOLLOWUP:[1 week]]

## 2023-02-13 NOTE — DISCHARGE NOTE PROVIDER - NSFOLLOWUPCLINICS_GEN_ALL_ED_FT
[facility]  Trauma Surgery  [address] [place] [address]  Phone: [phone]  Fax:

## 2023-02-13 NOTE — DISCHARGE NOTE PROVIDER - CARE PROVIDER_API CALL
Maritza Bruce)  Surgery; Surgical Critical Care  [address] [place] [address]  Phone: [phone]  Fax: [phone]  Follow Up Time: 2 weeks   Kurtis Alexandra)  Urology  [address] [facility], [address]  [place] [address]  Phone: [phone]  Fax: [phone]  Follow Up Time: 1 week    Trent Yoder)  Orthopaedic Surgery  [address] [place] [address]  Phone: [phone]  Fax: [phone]  Follow Up Time: 1 week

## 2023-02-13 NOTE — DISCHARGE NOTE PROVIDER - HOSPITAL COURSE
89yF w/ PMHx of HTN, DM, Dementia seen as  Trauma Alert s/p unwitnessed fall, ?ht, -loc, +eliquis. The patient was in her rocking chair when a family member heard a thud-the patient is unable to describe the events but a family member was at bedside stating she did not have positive head strike. The patient has no external signs of trauma and denies any pain. The patient was found to have pelvic fractures on trauma work up and admitted to the trauma service for rehab purposes. Orthopedics recommends non-op management.    Trauma work-up revealed the following injuries:  Left superior pubic rami fracture  w/ 1.5cm displacement  L3 endplate fracture  Bladder contusion    Orthopedic surgery evaluated the patient for the pubic rami fracture and recommended non-operative management. Patient was seen by Dr. Anderson Moss, orthopedic surgeon.    Neurosurgery evaluated the patient for the L3 endplate fracture and recommended non-operative management w/ abdominal binder / TLSO brace for comfort. Patient was seen by Dr. Ihsan Negron, neurosurgeon.    Urology evaluated the patient for the bladder contusion. A black was placed and maintained until a CT cystogram was performed the day prior to discharge. The CT showed no contrast extravasation from the bladder and the urology team recommended removing the black. The patient successfully passed their trial of void. Patient was seen by Dr. Kurtis Alexandra.    On admission, patient was found to have sepsis secondary to a UTI for which she was upgraded to the SICU and was treated with IV fluids, IV antibiotics, and pressors. Patient was subsequently weaned off pressors and improved clinically. She was then downgraded to the floor. Physical therapy evaluated the patient and recommended the patient be discharged home w/ home PT. Per patient's family, patient has a 24/7 home health aide.    Patient is currently medically stable for discharge.

## 2023-02-13 NOTE — PROGRESS NOTE ADULT - ASSESSMENT
HPI:  89yF w/ PMHx of HTN, DM, Dementia seen as  Trauma Alert s/p unwitnessed fall, ?ht, -loc, +eliquis. The patient was in her rocking chair when a family member heard a thud-the patient is unable to describe the events but a family member was at bedside stating she did not have positive head strike. The patient has no external signs of trauma and denies any pain. The patient was found to have pelvic fractures on trauma work up and admitted to the trauma service for rehab purposes. Orthopedics recommends non-op management.  (06 Feb 2023 04:06)    #Fall   PT    #HTN   BP: 102/61 (13 Feb 2023 08:54) (102/61 - 121/74)  controlled    #DM  POCT Blood Glucose.: 147 mg/dL (13 Feb 2023 11:38)  POCT Blood Glucose.: 104 mg/dL (13 Feb 2023 07:56)  POCT Blood Glucose.: 139 mg/dL (12 Feb 2023 21:19)  POCT Blood Glucose.: 133 mg/dL (12 Feb 2023 16:09)  controlled    #Dementia on donepizil    #RUBY on CKD 3  Creatinine Trend: 1.1<--, 1.3<--, 1.1<--, 1.2<--, 1.1<--, 1.2<-- resolved , at baseline   Creatinine, Serum: 1.3 mg/dL (10.13.18 @ 03:30) historically      #Euvolemic cardiomyopathy    #Chronic Afib,  , rate controlled , continue with eliquis     #Post fall, trauma, L3 & pubic Fx, but no bleeding    #NSVT episodes no intervention per cardio     #Hyperkalemia repeat bmp    #hyponatremia no intervention

## 2023-02-13 NOTE — PROGRESS NOTE ADULT - ASSESSMENT
Diagnosis: Fall on AC with pelvic fracture                  Hypotension due to hypovolemia     Plan:  - Urology reqs appreciated - repeat CT cysto before discharge completed  - f/u final urology reqs  - f/u respiratory status given worsening pleural effusions seen on imaging	  - supportive care  - pain management  - incentive spirometer   - continue diet  - DVT prophylaxis       [ x] SCDs [ ] Lovenox [x ] Heparins SQ  [ ] None  - GI prophylaxis  - case management evaluation

## 2023-02-13 NOTE — DISCHARGE NOTE PROVIDER - NSDCMRMEDTOKEN_GEN_ALL_CORE_FT
carvedilol 3.125 mg oral tablet: 1 tab(s) orally 2 times a day  donepezil 10 mg oral tablet: 1 tab(s) orally once a day (at bedtime)  Eliquis 2.5 mg oral tablet: 1 tab(s) orally 2 times a day  levothyroxine 25 mcg (0.025 mg) oral tablet: 1 tab(s) orally once a day  memantine 5 mg oral tablet: orally once a day  methenamine hippurate 1 g oral tablet: 1 tab(s) orally 2 times a day  metoprolol succinate 25 mg oral capsule, extended release: 1 cap(s) orally once a day  torsemide 20 mg oral tablet: 1 tab(s) orally once a day   acetaminophen 325 mg oral tablet: 2 tab(s) orally every 6 hours  carvedilol 3.125 mg oral tablet: 1 tab(s) orally 2 times a day  donepezil 10 mg oral tablet: 1 tab(s) orally once a day (at bedtime)  Eliquis 2.5 mg oral tablet: 1 tab(s) orally 2 times a day  levothyroxine 25 mcg (0.025 mg) oral tablet: 1 tab(s) orally once a day  memantine 5 mg oral tablet: orally once a day  methenamine hippurate 1 g oral tablet: 1 tab(s) orally 2 times a day  metoprolol succinate 25 mg oral capsule, extended release: 1 cap(s) orally once a day  midodrine 10 mg oral tablet: 1 tab(s) orally every 8 hours  torsemide 20 mg oral tablet: 1 tab(s) orally once a day

## 2023-02-13 NOTE — CHART NOTE - NSCHARTNOTEFT_GEN_A_CORE
GUPA    89 y/o F with hx of pelvic fracture. Thought to have a Bladder injury.  Pt now doing better.    Cystogram reviewed, no extravasation  May remove Fortune.   No further Urologic intervention  will d/w attending

## 2023-02-13 NOTE — PROGRESS NOTE ADULT - SUBJECTIVE AND OBJECTIVE BOX
SURGERY PROGRESS NOTE     Patient: BRIANNE DELACRUZ , 89y ([birth year])Female   MRN: [id number]  Location: [address]  Visit: 02-06-23 Inpatient  Date: 02-13-23 @ 01:47       Events of past 24 hours:    Patient seen resting comfortably in bed. No acute events overnight. Hemodynamically stable. Patient had repeat CT cysto - pending read. Will follow up with urology for final reqs regarding black catheter. Patient awaiting surgical bed delivery to daughter - Monday or Tuesday this week. Patient intended for discharge on 2/13.    PAST MEDICAL & SURGICAL HISTORY:  HTN (hypertension)      DM (diabetes mellitus)      CAD (coronary artery disease)      Afib      Bladder cancer  in remission      NHL (non-Hodgkin&#x27;s lymphoma)  in remission      HLD (hyperlipidemia)      Glaucoma      Cataract      Hypothyroidism      Congestive heart failure of unknown etiology      Congestive heart failure of unknown etiology      History of total hysterectomy           Vitals:   T(F): 98.1 (02-13-23 @ 01:11), Max: 98.1 (02-13-23 @ 01:11)  HR: 95 (02-13-23 @ 01:11)  BP: 116/74 (02-13-23 @ 01:11)  RR: 18 (02-13-23 @ 01:11)  SpO2: 96% (02-13-23 @ 01:11)          Fluids:     I & O's:    02-11-23 @ 07:01  -  02-12-23 @ 07:00  --------------------------------------------------------  IN:    Oral Fluid: 420 mL  Total IN: 420 mL    OUT:    Indwelling Catheter - Urethral (mL): 1200 mL  Total OUT: 1200 mL    Total NET: -780 mL           PHYSICAL EXAM:   General: NAD, AAOx3, calm and cooperative  Cardiac: RRR S1, S2  Respiratory: CTAB, normal respiratory effort  Abdomen: Soft, non-distended, non-tender, no rebound, no guarding. +BS.  Vascular: Pulses 2+ throughout, extremities well perfused  Skin: Warm/dry, normal color, no jaundice    MEDICATIONS  (STANDING):  acetaminophen     Tablet .. 650 milliGRAM(s) Oral every 6 hours  apixaban 2.5 milliGRAM(s) Oral every 12 hours  chlorhexidine 2% Cloths 1 Application(s) Topical <User Schedule>  donepezil 10 milliGRAM(s) Oral at bedtime  gabapentin 100 milliGRAM(s) Oral every 8 hours  insulin lispro (ADMELOG) corrective regimen sliding scale   SubCutaneous three times a day before meals  levothyroxine 25 MICROGram(s) Oral daily  memantine 5 milliGRAM(s) Oral daily  metoprolol tartrate 12.5 milliGRAM(s) Oral every 12 hours  midodrine 10 milliGRAM(s) Oral every 8 hours  senna 2 Tablet(s) Oral at bedtime    MEDICATIONS  (PRN):  traMADol 25 milliGRAM(s) Oral every 8 hours PRN Moderate Pain (4 - 6)      DVT PROPHYLAXIS:   GI PROPHYLAXIS:   ANTICOAGULATION:   ANTIBIOTICS:            LAB/STUDIES:  Labs:  CAPILLARY BLOOD GLUCOSE      POCT Blood Glucose.: 139 mg/dL (12 Feb 2023 21:19)  POCT Blood Glucose.: 133 mg/dL (12 Feb 2023 16:09)  POCT Blood Glucose.: 208 mg/dL (12 Feb 2023 11:17)  POCT Blood Glucose.: 112 mg/dL (12 Feb 2023 07:40)  POCT Blood Glucose.: 118 mg/dL (12 Feb 2023 03:31)                          11.2   10.30 )-----------( 197      ( 12 Feb 2023 21:53 )             36.1       Auto Neutrophil %: 42.7 % (02-12-23 @ 21:53)  Auto Immature Granulocyte %: 0.3 % (02-12-23 @ 21:53)    02-12    134<L>  |  101  |  28<H>  ----------------------------<  119<H>  6.9<HH>   |  17  |  1.3      Calcium, Total Serum: 10.1 mg/dL (02-12-23 @ 21:53)         IMAGING:     < from: CT Abdomen and Pelvis Cystogram w/ Catheter (02.12.23 @ 19:17) >    IMPRESSION:    No intraperitoneal or extraperitoneal contrast extravasation from the   bladder to suggest bladder rupture. Small amount of contrast pooling is   seen in the vaginal canal.    Increased moderate right pleural effusion with adjacent compression   atelectasis. Increased mild loculated left pleural effusion. Increased   mild pericardial effusion.    < end of copied text >

## 2023-02-13 NOTE — DISCHARGE NOTE PROVIDER - DETAILS OF MALNUTRITION DIAGNOSIS/DIAGNOSES
This patient has been assessed with a concern for Malnutrition and was treated during this hospitalization for the following Nutrition diagnosis/diagnoses:     -  02/08/2023: Moderate protein-calorie malnutrition

## 2023-02-13 NOTE — DISCHARGE NOTE PROVIDER - CARE PROVIDERS DIRECT ADDRESSES
,DirectAddress_Unknown ,[email or web address]

## 2023-02-13 NOTE — DISCHARGE NOTE PROVIDER - NSDCCPCAREPLAN_GEN_ALL_CORE_FT
PRINCIPAL DISCHARGE DIAGNOSIS  Diagnosis: Pubic ramus fracture  Assessment and Plan of Treatment:       SECONDARY DISCHARGE DIAGNOSES  Diagnosis: Fracture of lumbar spine  Assessment and Plan of Treatment:      PRINCIPAL DISCHARGE DIAGNOSIS  Diagnosis: Pubic ramus fracture  Assessment and Plan of Treatment: No acute orthopaedic surgical intervention  Trauma consult  Weight bearing: WBAT LLE  Pain control  Ice/elevation  Follow-up w/ Dr. Yoder, please call [phone] to schedule an appointment  Return to ED with uncontrolled pain/bleeding/fever/chills/numbness/tingling/cool extremity/inability to move extremity      SECONDARY DISCHARGE DIAGNOSES  Diagnosis: Contusion of bladder  Assessment and Plan of Treatment: Fortune catheter removed  Follow up with Dr. Alexandra as an outpatient in 1-2 weeks.   No further treatment was required       PRINCIPAL DISCHARGE DIAGNOSIS  Diagnosis: Pubic ramus fracture  Assessment and Plan of Treatment: No acute orthopaedic surgical intervention  Trauma consult  Weight bearing: WBAT LLE  Pain control  Ice/elevation  Follow-up w/ Dr. Yoder, please call [phone] to schedule an appointment  Return to ED with uncontrolled pain/bleeding/fever/chills/numbness/tingling/cool extremity/inability to move extremity      SECONDARY DISCHARGE DIAGNOSES  Diagnosis: Contusion of bladder  Assessment and Plan of Treatment: Fortune catheter removed  Follow up with Dr. Alexandra as an outpatient in 1-2 weeks.   No further treatment was required      Diagnosis: Closed compression fracture of L3 lumbar vertebra, initial encounter  Assessment and Plan of Treatment: No treatment required  Follow up with Dr. Champagne as an outpatient in 1-2 weeks    Diagnosis: Hypotension  Assessment and Plan of Treatment: Midrodrine started during hospitalization  Follow up with your cardiologist as an outpatient for further management

## 2023-02-14 ENCOUNTER — TRANSCRIPTION ENCOUNTER (OUTPATIENT)
Age: 88
End: 2023-02-14

## 2023-02-14 VITALS
DIASTOLIC BLOOD PRESSURE: 98 MMHG | TEMPERATURE: 98 F | OXYGEN SATURATION: 94 % | SYSTOLIC BLOOD PRESSURE: 120 MMHG | RESPIRATION RATE: 18 BRPM | HEART RATE: 70 BPM

## 2023-02-14 LAB
GLUCOSE BLDC GLUCOMTR-MCNC: 126 MG/DL — HIGH (ref 70–99)
GLUCOSE BLDC GLUCOMTR-MCNC: 141 MG/DL — HIGH (ref 70–99)
GLUCOSE BLDC GLUCOMTR-MCNC: 99 MG/DL — SIGNIFICANT CHANGE UP (ref 70–99)

## 2023-02-14 PROCEDURE — 93010 ELECTROCARDIOGRAM REPORT: CPT

## 2023-02-14 PROCEDURE — 99232 SBSQ HOSP IP/OBS MODERATE 35: CPT | Mod: 24,25

## 2023-02-14 RX ORDER — DEXTROSE 50 % IN WATER 50 %
50 SYRINGE (ML) INTRAVENOUS ONCE
Refills: 0 | Status: DISCONTINUED | OUTPATIENT
Start: 2023-02-14 | End: 2023-02-14

## 2023-02-14 RX ORDER — INSULIN HUMAN 100 [IU]/ML
10 INJECTION, SOLUTION SUBCUTANEOUS ONCE
Refills: 0 | Status: DISCONTINUED | OUTPATIENT
Start: 2023-02-14 | End: 2023-02-14

## 2023-02-14 RX ADMIN — Medication 12.5 MILLIGRAM(S): at 18:10

## 2023-02-14 RX ADMIN — MIDODRINE HYDROCHLORIDE 10 MILLIGRAM(S): 2.5 TABLET ORAL at 05:44

## 2023-02-14 RX ADMIN — Medication 650 MILLIGRAM(S): at 18:08

## 2023-02-14 RX ADMIN — APIXABAN 2.5 MILLIGRAM(S): 2.5 TABLET, FILM COATED ORAL at 18:07

## 2023-02-14 RX ADMIN — Medication 650 MILLIGRAM(S): at 12:18

## 2023-02-14 RX ADMIN — Medication 650 MILLIGRAM(S): at 05:45

## 2023-02-14 RX ADMIN — GABAPENTIN 100 MILLIGRAM(S): 400 CAPSULE ORAL at 05:45

## 2023-02-14 RX ADMIN — APIXABAN 2.5 MILLIGRAM(S): 2.5 TABLET, FILM COATED ORAL at 05:44

## 2023-02-14 RX ADMIN — GABAPENTIN 100 MILLIGRAM(S): 400 CAPSULE ORAL at 15:02

## 2023-02-14 RX ADMIN — Medication 25 MICROGRAM(S): at 05:46

## 2023-02-14 RX ADMIN — MIDODRINE HYDROCHLORIDE 10 MILLIGRAM(S): 2.5 TABLET ORAL at 15:02

## 2023-02-14 RX ADMIN — MEMANTINE HYDROCHLORIDE 5 MILLIGRAM(S): 10 TABLET ORAL at 12:18

## 2023-02-14 RX ADMIN — CHLORHEXIDINE GLUCONATE 1 APPLICATION(S): 213 SOLUTION TOPICAL at 05:46

## 2023-02-14 RX ADMIN — Medication 650 MILLIGRAM(S): at 00:00

## 2023-02-14 NOTE — PROGRESS NOTE ADULT - PROVIDER SPECIALTY LIST ADULT
Hospitalist
Internal Medicine
SICU
Surgery
Trauma Surgery
Trauma Surgery
Hospitalist
Hospitalist
Physiatry
SICU
SICU
Trauma Surgery
SICU
Surgery
Trauma Surgery
Trauma Surgery

## 2023-02-14 NOTE — DISCHARGE NOTE NURSING/CASE MANAGEMENT/SOCIAL WORK - PATIENT PORTAL LINK FT
You can access the FollowMyHealth Patient Portal offered by [facility] by registering at the following website: [email or web address]. By joining [facility]’s FollowMyHealth portal, you will also be able to view your health information using other applications (apps) compatible with our system.

## 2023-02-14 NOTE — PROGRESS NOTE ADULT - ATTENDING SUPERVISION STATEMENT
Resident
Resident/Fellow

## 2023-02-14 NOTE — PROGRESS NOTE ADULT - ASSESSMENT
Diagnosis: Fall on AC with pelvic fracture                  Hypotension due to hypovolemia     Plan:  - Repeat cysto negative for extrav - black removed. Passed TOV  - f/u respiratory status given worsening pleural effusions seen on imaging	  - supportive care  - pain management  - incentive spirometer   - continue diet  - DVT prophylaxis       [ x] SCDs [ ] Lovenox [x ] Heparins SQ  [ ] None  - GI prophylaxis  - case management evaluation

## 2023-02-14 NOTE — PROGRESS NOTE ADULT - ATTENDING COMMENTS
Patient remains awake and alert  Still on Levo at 0.02 mcg/kg/min.  Pain is controlled.    ASSESSMENT:  89 y/o female, S/P Fall.  Closed Left Superior and Inferior Pubic Rami Fractures.  Left Pelvic Hematoma.  L3 compression fracture.  Acute pain due to trauma.  Hypotension. Hypovolemia.  At risk for hemodynamic instability.  CHF. A.fib.  RUBY, improving.    PLAN:  - pain control prn  - incentive spirometer - need to be tough  - keep MAP>65; wean Levo off; on Midodrine 10 mg q8h  - on po diet  - follow serum electrolytes and UOP  - keep Fortune as per Urology  - DVT prophylaxis
Trauma/ACS Attending  Note Attestation    Patient is examined and evaluated at the bedside with the residents/PAs. Treatment plan discussed with the team, nurses, and consulting physicians and consulting teams. Medications, radiological studies and all other relevant studies reviewed.     BRIANNE DELACRUZ Patient is a 89y old  Female who presents with a chief complaint of Fall on AC at home and sustained pelvic fracture with persistent hypotension without evidence of expanding hematoma     Diagnosis: Fall on AC with pelvic fracture                  Hypotension due to hypovolemia     Plan:	  - supportive care as per SICU team  - pain management  - incentive spirometer   - DVT prophylaxis       [ x] SCDs [ ] Lovenox [x ] Heparins SQ  [ ] None  - GI prophylaxis  - follow up consults  - repeat studies as needed  - PT evaluation and follow up  - replace electrolytes  - case management evaluation     Maritza Bruce MD, FACS  Trauma/ACS/Surgical Critical Care Attending
Trauma/ACS Attending  Note Attestation    Patient is examined and evaluated at the bedside with the residents/PAs. Treatment plan discussed with the team, nurses, and consulting physicians and consulting teams. Medications, radiological studies and all other relevant studies reviewed.     BRIANNE DELACRUZ Patient is a 89y old  Female who presents with a chief complaint of Fall on AC at home and sustained pelvic fracture with persistent hypotension without evidence of expanding hematoma     Vital Signs Last 24 Hrs  T(C): 35.9 (09 Feb 2023 08:00), Max: 36.2 (08 Feb 2023 23:01)  T(F): 96.7 (09 Feb 2023 08:00), Max: 97.2 (08 Feb 2023 23:01)  HR: 79 (09 Feb 2023 10:45) (69 - 126)  BP: 99/69 (09 Feb 2023 10:00) (92/59 - 155/72)  BP(mean): 79 (09 Feb 2023 10:00) (71 - 109)  RR: 20 (09 Feb 2023 10:45) (10 - 43)  SpO2: 97% (09 Feb 2023 10:45) (90% - 100%)    Parameters below as of 09 Feb 2023 10:00  Patient On (Oxygen Delivery Method): nasal cannula  O2 Flow (L/min): 1                          10.9   9.25  )-----------( 190      ( 08 Feb 2023 20:19 )             33.4   02-08    136  |  104  |  38<H>  ----------------------------<  148<H>  4.3   |  24  |  1.5    Ca    9.2      08 Feb 2023 20:19  Phos  2.4     02-08  Mg     2.0     02-08      Diagnosis: Fall on AC with pelvic fracture                  Hypotension due to hypovolemia     Plan:	  - supportive care as per SICU team  - pain management  - incentive spirometer   - DVT prophylaxis       [ x] SCDs [ ] Lovenox [x ] Heparins SQ  [ ] None  - GI prophylaxis  - follow up consults  - repeat studies as needed  - PT evaluation and follow up  - replace electrolytes  - case management evaluation     Maritza Bruce MD, FACS  Trauma/ACS/Surgical Critical Care Attending
Trauma/ACS Attending  Note Attestation    Patient is examined and evaluated at the bedside with the residents/PAs. Treatment plan discussed with the team, nurses, and consulting physicians and consulting teams. Medications, radiological studies and all other relevant studies reviewed.     BRIANNE DELACRUZ Patient is a 89y old  Female who presents with a chief complaint of Fall on AC at home and sustained pelvic fracture with persistent hypotension without evidence of expanding hematoma     Vital Signs Last 24 Hrs  T(C): 36 (10 Feb 2023 12:00), Max: 36.1 (10 Feb 2023 04:00)  T(F): 96.8 (10 Feb 2023 12:00), Max: 96.9 (10 Feb 2023 04:00)  HR: 86 (10 Feb 2023 12:00) (66 - 110)  BP: 140/68 (10 Feb 2023 12:00) (94/53 - 145/83)  BP(mean): 96 (10 Feb 2023 12:00) (68 - 105)  RR: 20 (10 Feb 2023 12:00) (15 - 34)  SpO2: 96% (10 Feb 2023 12:00) (92% - 100%)    Parameters below as of 10 Feb 2023 12:00  Patient On (Oxygen Delivery Method): nasal cannula  O2 Flow (L/min): 1                          10.7   9.63  )-----------( 186      ( 09 Feb 2023 21:45 )             33.1   02-09    132<L>  |  102  |  30<H>  ----------------------------<  201<H>  4.7   |  20  |  1.2    Ca    9.0      09 Feb 2023 21:45  Phos  3.3     02-09  Mg     1.9     02-09        Diagnosis: Fall on AC with pelvic fracture                  Hypotension due to hypovolemia     Plan:	  - supportive care as per SICU team  - pain management  - incentive spirometer   - DVT prophylaxis       [ x] SCDs [ ] Lovenox [x ] Heparins SQ  [ ] None  - GI prophylaxis  - follow up consults  - repeat studies as needed  - PT evaluation and follow up  - replace electrolytes  - case management evaluation     Maritza Bruce MD, FACS  Trauma/ACS/Surgical Critical Care Attending
Trauma/ACS Attending  Note Attestation    Patient is examined and evaluated at the bedside with the residents/PAs. Treatment plan discussed with the team, nurses, and consulting physicians and consulting teams. Medications, radiological studies and all other relevant studies reviewed.     BRIANNE DELACRUZ Patient is a 89y old  Female who presents with a chief complaint of Fall on AC at home and sustained pelvic fracture with persistent hypotension without evidence of expanding hematoma.    Vital Signs Last 24 Hrs  T(C): 36.4 (14 Feb 2023 15:12), Max: 36.4 (14 Feb 2023 15:12)  T(F): 97.5 (14 Feb 2023 15:12), Max: 97.5 (14 Feb 2023 15:12)  HR: 70 (14 Feb 2023 15:12) (53 - 70)  BP: 120/98 (14 Feb 2023 15:12) (120/98 - 130/78)  BP(mean): --  RR: 18 (14 Feb 2023 15:12) (18 - 18)  SpO2: 94% (14 Feb 2023 15:12) (94% - 95%)    Parameters below as of 14 Feb 2023 15:12  Patient On (Oxygen Delivery Method): room air                            11.4   9.63  )-----------( 241      ( 13 Feb 2023 22:01 )             36.4   02-13    132<L>  |  100  |  30<H>  ----------------------------<  135<H>  5.4<H>   |  21  |  1.2    Ca    9.9      13 Feb 2023 22:01  Phos  3.5     02-13  Mg     2.0     02-13      Diagnosis: Fall on AC with pelvic fracture                  Hypotension due to hypovolemia     Plan:	  - supportive care   - continue diet  - pain management  - incentive spirometer   - DVT prophylaxis       [ x] SCDs [ ] Lovenox [x ] Heparins SQ  [ ] None  - GI prophylaxis  - follow up consults  - repeat studies as needed  - PT evaluation and follow up  - replace electrolytes  - case management evaluation     Maritza Bruce MD, FACS  Trauma/ACS/Surgical Critical Care Attending
Patient is more awake today.  Pain is controlled  Still on Levo at 0.03 mcg/kg/min    89 y/o female, S/P Fall.  Closed Left Superior and Inferior Pubic Rami Fractures.  Left Pelvic Hematoma.  L3 compression fracture.  Acute pain due to trauma.  Hypotension. Hypovolemia.  At risk for hemodynamic instability.  CHF. A.fib.  RUBY     PLAN:  - pain control prn  - encourage incentive spirometer  - keep MAP>65; start Midodrine po  - Cardiology eval pending  - start soft diet  - aspiration precautions at all time  - follow serum electrolytes and UOP  - start DVT chemoprophylaxis  - PT
Trauma/ACS Attending  Note Attestation    Patient is examined and evaluated at the bedside with the residents/PAs. Treatment plan discussed with the team, nurses, and consulting physicians and consulting teams. Medications, radiological studies and all other relevant studies reviewed.     BRIANNE DELACRUZ Patient is a 89y old  Female who presents with a chief complaint of Fall on AC at home and sustaine pelvic fracture      Vital Signs Last 24 Hrs  T(C): 35.9 (07 Feb 2023 07:43), Max: 36.6 (06 Feb 2023 20:00)  T(F): 96.6 (07 Feb 2023 07:43), Max: 97.8 (06 Feb 2023 20:00)  HR: 69 (07 Feb 2023 17:00) (54 - 98)  BP: 132/80 (07 Feb 2023 07:00) (103/58 - 167/77)  BP(mean): 101 (07 Feb 2023 07:00) (76 - 111)  RR: 23 (07 Feb 2023 17:00) (10 - 24)  SpO2: 89% (07 Feb 2023 17:00) (87% - 100%)    Parameters below as of 07 Feb 2023 13:00  Patient On (Oxygen Delivery Method): nasal cannula  O2 Flow (L/min): 2                          10.9   8.33  )-----------( 190      ( 06 Feb 2023 20:22 )             33.8     02-06    135  |  100  |  40<H>  ----------------------------<  125<H>  4.5   |  23  |  1.5    Ca    9.6      06 Feb 2023 20:22  Phos  4.8     02-06  Mg     2.0     02-06    TPro  7.0  /  Alb  3.4<L>  /  TBili  0.6  /  DBili  x   /  AST  142<H>  /  ALT  72<H>  /  AlkPhos  216<H>  02-05      Diagnosis: Fall on AC with pelvic fracture                  Hypotension due to hypovolemia     Plan:	  - supportive care as per SICU team  - pain management  - incentive spirometer   - DVT prophylaxis       [ x] SCDs [ ] Lovenox [x ] Heparins SQ  [ ] None  - GI prophylaxis  - follow up consults  - repeat studies as needed  - PT evaluation and follow up  - replace electrolytes  - case management evaluation     Maritza Bruce MD, FACS  Trauma/ACS/Surgical Critical Care Attending
Trauma/ACS Attending  Note Attestation    Patient is examined and evaluated at the bedside with the residents/PAs. Treatment plan discussed with the team, nurses, and consulting physicians and consulting teams. Medications, radiological studies and all other relevant studies reviewed.     BRIANNE DELACRUZ Patient is a 89y old  Female who presents with a chief complaint of Fall on AC at home and sustained pelvic fracture with persistent hypotension without evidence of expanding hematoma     Vital Signs Last 24 Hrs  T(C): 35.1 (11 Feb 2023 09:13), Max: 36.9 (10 Feb 2023 20:00)  T(F): 95.1 (11 Feb 2023 09:13), Max: 98.4 (10 Feb 2023 20:00)  HR: 80 (11 Feb 2023 09:13) (74 - 108)  BP: 106/69 (11 Feb 2023 09:13) (98/56 - 146/65)  BP(mean): 84 (10 Feb 2023 20:00) (72 - 96)  RR: 18 (11 Feb 2023 09:13) (15 - 20)  SpO2: 96% (11 Feb 2023 09:13) (91% - 100%)    Parameters below as of 11 Feb 2023 09:13  Patient On (Oxygen Delivery Method): nasal cannula                            10.5   10.75 )-----------( 215      ( 11 Feb 2023 07:01 )             34.0   02-11    138  |  105  |  27<H>  ----------------------------<  125<H>  4.2   |  27  |  1.1    Ca    9.4      11 Feb 2023 07:01  Phos  2.7     02-11  Mg     1.9     02-09      Diagnosis: Fall on AC with pelvic fracture                  Hypotension due to hypovolemia     Plan:	  - supportive care  - pain management  - incentive spirometer   - continue diet  - DVT prophylaxis       [ x] SCDs [ ] Lovenox [x ] Heparins SQ  [ ] None  - GI prophylaxis  - follow up consults, cardiology   - repeat studies as needed  - PT evaluation and follow up  - replace electrolytes  - case management evaluation     Maritza Bruce MD, FACS  Trauma/ACS/Surgical Critical Care Attending
Trauma/ACS Attending  Note Attestation    Patient is examined and evaluated at the bedside with the residents/PAs. Treatment plan discussed with the team, nurses, and consulting physicians and consulting teams. Medications, radiological studies and all other relevant studies reviewed.     BRIANNE DELACRUZ Patient is a 89y old  Female who presents with a chief complaint of Fall on AC at home and sustained pelvic fracture with persistent hypotension without evidence of expanding hematoma.    Diagnosis: Fall on AC with pelvic fracture                  Hypotension due to hypovolemia     Plan:	  - supportive care   - continue diet  - pain management  - incentive spirometer   - DVT prophylaxis       [ x] SCDs [ ] Lovenox [x ] Heparins SQ  [ ] None  - GI prophylaxis  - follow up consults  - repeat studies as needed  - PT evaluation and follow up  - replace electrolytes  - case management evaluation     Maritza Bruce MD, FACS  Trauma/ACS/Surgical Critical Care Attending
Patient is more awake today.  Still on Levo at 0.09 mcg/kg/min.  In A.fib, had RVR last night, currently rate controlled.    ASSESSMENT:  89 y/o female, S/P Fall.  Closed Left Superior and Inferior Pubic Rami Fractures.  Left Pelvic Hematoma.  L3 compression fracture.  Acute pain due to trauma.  Hypotension. Hypovolemia.  At risk for hemodynamic instability.  CHF. A.fib.  RUBY.    PLAN:  - pain control - avoid opioids  - encourage IS  - wean O2 with NC  - keep MAP>65; wean Levo off  - on po diet  - follow serum electrolytes and UOP  - Urology f/u needed for need to keep Fortune  - DVT prophylaxis
AAO x2  Pain controlled.  Tolerates diet.  Creat 1.1    ASSESSMENT:  89 y/o female, S/P Fall.  Closed Left Superior and Inferior Pubic Rami Fractures.  Left Pelvic Hematoma.  L3 compression fracture.  Acute pain due to trauma.  Hypotension.   At risk for hemodynamic instability.  CHF. A.fib.  RUBY, improving.    PLAN:  - incentive spirometer  - PT  - DVT prophylaxis  - SS for discharge planning
Patient is AAO x2 at her baseline.  Off Levo since last night.  Had brief episode of nonsustained V.Tach last night.  Creat 1.2, better.  On 2L O2 for her COPD.    ASSESSMENT:  89 y/o female, S/P Fall.  Closed Left Superior and Inferior Pubic Rami Fractures.  Left Pelvic Hematoma.  L3 compression fracture.  Acute pain due to trauma.  Hypotension.   At risk for hemodynamic instability.  CHF. A.fib.  RUBY, improving.    PLAN:  - pain control prn - avoid opioids  - encourage IS; on O2 2L with NC  - keep MAP>65; on Midodrine 10 mg q8h  - reg. diet  - follow serum electrolytes and UOP  - keep Fortune stays as per Urology until repeat CT cysto prior to discharge  - DVT prophylaxis   - needs aggressive PT

## 2023-02-14 NOTE — PROGRESS NOTE ADULT - SUBJECTIVE AND OBJECTIVE BOX
SURGERY PROGRESS NOTE     Patient: BRIANNE DELACRUZ , 89y ([birth year])Female   MRN: [id number]  Location: [address]  Visit: 02-06-23 Inpatient  Date: 02-14-23 @ 10:51       Events of past 24 hours:  Patient seen resting comfortably in bed. No acute events overnight. Hemodynamically stable.     PAST MEDICAL & SURGICAL HISTORY:  HTN (hypertension)      DM (diabetes mellitus)      CAD (coronary artery disease)      Afib      Bladder cancer  in remission      NHL (non-Hodgkin&#x27;s lymphoma)  in remission      HLD (hyperlipidemia)      Glaucoma      Cataract      Hypothyroidism      Congestive heart failure of unknown etiology      Congestive heart failure of unknown etiology      History of total hysterectomy           Vitals:   T(F): 97.2 (02-14-23 @ 07:15), Max: 98.1 (02-13-23 @ 15:16)  HR: 55 (02-14-23 @ 07:15)  BP: 130/78 (02-14-23 @ 05:41)  RR: 18 (02-14-23 @ 07:15)  SpO2: 95% (02-14-23 @ 07:15)          Fluids:     I & O's:    02-13-23 @ 07:01  -  02-14-23 @ 07:00  --------------------------------------------------------  IN:    Oral Fluid: 120 mL  Total IN: 120 mL    OUT:    Voided (mL): 750 mL  Total OUT: 750 mL    Total NET: -630 mL           PHYSICAL EXAM:   General: NAD, AAOx3, calm and cooperative  Cardiac: RRR S1, S2  Respiratory: CTAB, normal respiratory effort  Abdomen: Soft, non-distended, non-tender, no rebound, no guarding. +BS.  Vascular: Pulses 2+ throughout, extremities well perfused  Skin: Warm/dry, normal color, no jaundice      MEDICATIONS  (STANDING):  acetaminophen     Tablet .. 650 milliGRAM(s) Oral every 6 hours  apixaban 2.5 milliGRAM(s) Oral every 12 hours  chlorhexidine 2% Cloths 1 Application(s) Topical <User Schedule>  dextrose 50% Injectable 50 milliLiter(s) IV Push once  donepezil 10 milliGRAM(s) Oral at bedtime  gabapentin 100 milliGRAM(s) Oral every 8 hours  insulin lispro (ADMELOG) corrective regimen sliding scale   SubCutaneous three times a day before meals  insulin regular  human recombinant 10 Unit(s) IV Push once  levothyroxine 25 MICROGram(s) Oral daily  memantine 5 milliGRAM(s) Oral daily  metoprolol tartrate 12.5 milliGRAM(s) Oral every 12 hours  midodrine 10 milliGRAM(s) Oral every 8 hours  senna 2 Tablet(s) Oral at bedtime    MEDICATIONS  (PRN):  traMADol 25 milliGRAM(s) Oral every 8 hours PRN Moderate Pain (4 - 6)      DVT PROPHYLAXIS:   GI PROPHYLAXIS:   ANTICOAGULATION:   ANTIBIOTICS:            LAB/STUDIES:  Labs:  CAPILLARY BLOOD GLUCOSE      POCT Blood Glucose.: 99 mg/dL (14 Feb 2023 08:05)  POCT Blood Glucose.: 135 mg/dL (13 Feb 2023 21:36)  POCT Blood Glucose.: 171 mg/dL (13 Feb 2023 16:50)  POCT Blood Glucose.: 147 mg/dL (13 Feb 2023 11:38)                          11.4   9.63  )-----------( 241      ( 13 Feb 2023 22:01 )             36.4       Auto Neutrophil %: 37.8 % (02-13-23 @ 22:01)  Auto Immature Granulocyte %: 0.4 % (02-13-23 @ 22:01)    02-13    132<L>  |  100  |  30<H>  ----------------------------<  135<H>  5.4<H>   |  21  |  1.2      Calcium, Total Serum: 9.9 mg/dL (02-13-23 @ 22:01)
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
## 2023-02-21 DIAGNOSIS — S32.039A UNSPECIFIED FRACTURE OF THIRD LUMBAR VERTEBRA, INITIAL ENCOUNTER FOR CLOSED FRACTURE: ICD-10-CM

## 2023-02-21 DIAGNOSIS — N17.9 ACUTE KIDNEY FAILURE, UNSPECIFIED: ICD-10-CM

## 2023-02-21 DIAGNOSIS — N39.0 URINARY TRACT INFECTION, SITE NOT SPECIFIED: ICD-10-CM

## 2023-02-21 DIAGNOSIS — E11.22 TYPE 2 DIABETES MELLITUS WITH DIABETIC CHRONIC KIDNEY DISEASE: ICD-10-CM

## 2023-02-21 DIAGNOSIS — I50.22 CHRONIC SYSTOLIC (CONGESTIVE) HEART FAILURE: ICD-10-CM

## 2023-02-21 DIAGNOSIS — I47.20 VENTRICULAR TACHYCARDIA, UNSPECIFIED: ICD-10-CM

## 2023-02-21 DIAGNOSIS — Z85.51 PERSONAL HISTORY OF MALIGNANT NEOPLASM OF BLADDER: ICD-10-CM

## 2023-02-21 DIAGNOSIS — E03.9 HYPOTHYROIDISM, UNSPECIFIED: ICD-10-CM

## 2023-02-21 DIAGNOSIS — A41.9 SEPSIS, UNSPECIFIED ORGANISM: ICD-10-CM

## 2023-02-21 DIAGNOSIS — N18.30 CHRONIC KIDNEY DISEASE, STAGE 3 UNSPECIFIED: ICD-10-CM

## 2023-02-21 DIAGNOSIS — L89.156 PRESSURE-INDUCED DEEP TISSUE DAMAGE OF SACRAL REGION: ICD-10-CM
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
## 2023-02-21 DIAGNOSIS — I25.10 ATHEROSCLEROTIC HEART DISEASE OF NATIVE CORONARY ARTERY WITHOUT ANGINA PECTORIS: ICD-10-CM

## 2023-02-21 DIAGNOSIS — E44.0 MODERATE PROTEIN-CALORIE MALNUTRITION: ICD-10-CM

## 2023-02-21 DIAGNOSIS — I48.20 CHRONIC ATRIAL FIBRILLATION, UNSPECIFIED: ICD-10-CM

## 2023-02-21 DIAGNOSIS — I13.0 HYPERTENSIVE HEART AND CHRONIC KIDNEY DISEASE WITH HEART FAILURE AND STAGE 1 THROUGH STAGE 4 CHRONIC KIDNEY DISEASE, OR UNSPECIFIED CHRONIC KIDNEY DISEASE: ICD-10-CM

## 2023-02-21 DIAGNOSIS — S32.512A FRACTURE OF SUPERIOR RIM OF LEFT PUBIS, INITIAL ENCOUNTER FOR CLOSED FRACTURE: ICD-10-CM

## 2023-02-21 DIAGNOSIS — Z78.1 PHYSICAL RESTRAINT STATUS: ICD-10-CM

## 2023-02-21 DIAGNOSIS — E87.1 HYPO-OSMOLALITY AND HYPONATREMIA: ICD-10-CM

## 2023-02-21 DIAGNOSIS — Z79.890 HORMONE REPLACEMENT THERAPY: ICD-10-CM

## 2023-02-21 DIAGNOSIS — Z90.710 ACQUIRED ABSENCE OF BOTH CERVIX AND UTERUS: ICD-10-CM

## 2023-02-21 DIAGNOSIS — E87.5 HYPERKALEMIA: ICD-10-CM

## 2023-02-21 DIAGNOSIS — S37.22XA CONTUSION OF BLADDER, INITIAL ENCOUNTER: ICD-10-CM

## 2023-02-21 DIAGNOSIS — N18.32 CHRONIC KIDNEY DISEASE, STAGE 3B: ICD-10-CM

## 2023-02-21 DIAGNOSIS — R65.21 SEVERE SEPSIS WITH SEPTIC SHOCK: ICD-10-CM

## 2023-02-21 DIAGNOSIS — D68.32 HEMORRHAGIC DISORDER DUE TO EXTRINSIC CIRCULATING ANTICOAGULANTS: ICD-10-CM

## 2023-02-21 DIAGNOSIS — G93.41 METABOLIC ENCEPHALOPATHY: ICD-10-CM

## 2023-05-03 NOTE — ED PROVIDER NOTE - CROS ED CONS ALL NEG
negative... Nsaids Counseling: NSAID Counseling: I discussed with the patient that NSAIDs should be taken with food. Prolonged use of NSAIDs can result in the development of stomach ulcers.  Patient advised to stop taking NSAIDs if abdominal pain occurs.  The patient verbalized understanding of the proper use and possible adverse effects of NSAIDs.  All of the patient's questions and concerns were addressed.

## 2023-08-03 NOTE — CONSULT NOTE ADULT - ASSESSMENT
ASSESSMENT:  89y Female  w/ PMHx of dementia, hld, htn seen as  Trauma Alert s/p unwitnessed fall, ?ht, -loc. +eliquis with no complaints, no external signs of trauma . Trauma assessment in ED: ABCs intact , GCS 15 , AAOx3,  ELIZONDO.     Injuries identified:   - pending  -   -     PLAN:   - Trauma Labs: (CBC, BMP, Coags, T&S, UA, EtOH level)  -Pan scan  -CXR, Pelvic xray        Disposition pending results of above labs and imaging  Above plan discussed with Trauma attending, Dr. Bruce , patient, patient family, and ED team  --------------------------------------------------------------------------------------  02-05-23 @ 20:03 Goal Outcome Evaluation:      Pt. Demonstrates improved ADL participation this date following posterior fusion of C4-C6 8/2, completes grooming EOB ADL activity w/ min A to complete hair care. Pt. W/ limited shoulder AROM and increased pain during prolonged participation. Pot. Completes ambulatory transfer to and from bathroom w/ CGA for safety and toileting ADL w/ setup assist. Pt. Not safe to d/c home at this time and will require IP Rehab to address aforementioned deficits.

## 2023-08-04 NOTE — PROCEDURE NOTE - NSPROCDETAILS_GEN_ALL_CORE
location identified, draped/prepped, sterile technique used, needle inserted/introduced/positive blood return obtained via catheter/connected to a pressurized flush line/sutured in place
04-Aug-2023 15:45
none

## 2023-10-18 NOTE — ED ADULT TRIAGE NOTE - ESI TRIAGE ACUITY LEVEL, MLM
3 Clindamycin Pregnancy And Lactation Text: This medication can be used in pregnancy if certain situations. Clindamycin is also present in breast milk.

## 2023-12-09 NOTE — ED ADULT TRIAGE NOTE - NS ED NURSE AMBULANCES
[employer] 82 y/o female PMHx A-fib on Eliquis, Chronic back pain, Coronary artery disease, Diabetes, Former smoker, HLD, HTN, Myocardial infarct, Non-small cell lung cancer stage IV with brain mets s/p craniectomy previously on Keytruda, PAD, Peripheral neuropathy arrives to [facility] ED by car from home with son with c/o abnormal lab results. Pt states had lab work done Thursday, sent to ED for elevated LFTs. Hepatology observing patient over 1 month, concerned for up-trending AST, ALT and bilirubin. Patient endorses "been itchy and poor appetite and since craniotomy", skin and sclera jaundice. Patient is A&Ox4. Respirations spontaneous and unlabored. Denies SOB, CP, abdominal pain, n/v/d, urinary symptoms, fever/chills. Skin intact. 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 Pt states had lab work done Thursday, sent to ED for elevated LFTs. Hepatology observing patient over 1 month, concerned for up-trending AST, ALT and bilirubin. Patient endorses "been itchy and poor appetite and since craniotomy", skin and sclera jaundice. Patient is A&Ox4. Respirations spontaneous and unlabored. Denies SOB, CP, abdominal pain, n/v/d, urinary symptoms, fever/chills. Skin intact.

## 2024-11-21 NOTE — ED PROVIDER NOTE - WR ORDER STATUS 3
Goal Outcome Evaluation:      Plan of Care Reviewed With: patient    Overall Patient Progress: no changeOverall Patient Progress: no change     Pt alert and oriented x4. Denies chest pain, shortness of breath. Mixed continence, on bowel program, scheduled suppository given, no BM, LBM 11/19 per pt report. Significant other noticed slight increase in right cranial swelling, on call provider notified, response to notify again if change in neuro status or fever. Call light within reach, bed in low position.        Performed Resulted

## 2025-05-13 NOTE — DISCHARGE NOTE ADULT - MEDICATION SUMMARY - MEDICATIONS TO STOP TAKING
Patient states she's having bad lower leg swelling/ water retention and wanted to know if she can get a water pill. States its very difficult to walk. Please advise    I will STOP taking the medications listed below when I get home from the hospital:  None

## 2025-05-23 NOTE — PHYSICAL THERAPY INITIAL EVALUATION ADULT - GENERAL OBSERVATIONS, REHAB EVAL
DISPLAY PLAN FREE TEXT
pt. encountered in bed in NAD + tele, +black, +O2 via nc, +sequentials, +IV +A line. pt asleep but easy to arouse [phone]